# Patient Record
Sex: MALE | Race: WHITE | Employment: OTHER | URBAN - METROPOLITAN AREA
[De-identification: names, ages, dates, MRNs, and addresses within clinical notes are randomized per-mention and may not be internally consistent; named-entity substitution may affect disease eponyms.]

---

## 2017-06-19 PROBLEM — R29.6 FREQUENT FALLS: Status: ACTIVE | Noted: 2017-06-19

## 2017-06-19 PROBLEM — R53.1 LEFT-SIDED WEAKNESS: Status: ACTIVE | Noted: 2017-06-19

## 2017-06-19 PROBLEM — R53.81 PHYSICAL DECONDITIONING: Status: ACTIVE | Noted: 2017-06-19

## 2017-06-20 PROBLEM — D72.829 LEUKOCYTOSIS: Status: ACTIVE | Noted: 2017-06-20

## 2017-06-20 PROBLEM — I71.40 ABDOMINAL AORTIC ANEURYSM (AAA) WITHOUT RUPTURE: Status: ACTIVE | Noted: 2017-06-20

## 2017-06-20 PROBLEM — R13.0 APHAGIA: Status: ACTIVE | Noted: 2017-06-20

## 2017-09-17 PROBLEM — J98.11 ATELECTASIS: Status: ACTIVE | Noted: 2017-09-17

## 2017-09-17 PROBLEM — R53.81 DEBILITY: Status: ACTIVE | Noted: 2017-09-17

## 2017-09-17 PROBLEM — R03.0 ELEVATED BLOOD PRESSURE READING: Status: ACTIVE | Noted: 2017-09-17

## 2017-09-17 PROBLEM — E78.5 DYSLIPIDEMIA: Status: ACTIVE | Noted: 2017-09-17

## 2017-09-17 PROBLEM — E03.4 HYPOTHYROIDISM DUE TO ACQUIRED ATROPHY OF THYROID: Status: ACTIVE | Noted: 2017-09-17

## 2017-10-27 PROCEDURE — 93272 ECG/REVIEW INTERPRET ONLY: CPT | Performed by: INTERNAL MEDICINE

## 2017-11-06 DIAGNOSIS — I63.231 ARTERIAL ISCHEMIC STROKE, ICA, RIGHT, ACUTE (HCC): ICD-10-CM

## 2018-12-19 ENCOUNTER — APPOINTMENT (OUTPATIENT)
Dept: CT IMAGING | Age: 83
End: 2018-12-19
Payer: MEDICARE

## 2018-12-19 ENCOUNTER — HOSPITAL ENCOUNTER (OUTPATIENT)
Age: 83
Setting detail: OBSERVATION
Discharge: NURSING, REHAB OR LONG-TERM CARE | End: 2018-12-22
Attending: EMERGENCY MEDICINE | Admitting: FAMILY MEDICINE
Payer: MEDICARE

## 2018-12-19 ENCOUNTER — APPOINTMENT (OUTPATIENT)
Dept: GENERAL RADIOLOGY | Age: 83
End: 2018-12-19
Payer: MEDICARE

## 2018-12-19 DIAGNOSIS — R11.2 NAUSEA AND VOMITING, INTRACTABILITY OF VOMITING NOT SPECIFIED, UNSPECIFIED VOMITING TYPE: ICD-10-CM

## 2018-12-19 DIAGNOSIS — R41.82 ALTERED MENTAL STATUS, UNSPECIFIED ALTERED MENTAL STATUS TYPE: Primary | ICD-10-CM

## 2018-12-19 PROBLEM — R11.10 VOMITING: Status: ACTIVE | Noted: 2018-12-19

## 2018-12-19 LAB
A/G RATIO: 1.3 (ref 1.1–2.2)
ABO/RH: NORMAL
ALBUMIN SERPL-MCNC: 3.4 G/DL (ref 3.4–5)
ALP BLD-CCNC: 55 U/L (ref 40–129)
ALT SERPL-CCNC: 19 U/L (ref 10–40)
ANION GAP SERPL CALCULATED.3IONS-SCNC: 11 MMOL/L (ref 3–16)
ANTIBODY SCREEN: NORMAL
AST SERPL-CCNC: 17 U/L (ref 15–37)
BASOPHILS ABSOLUTE: 0.1 K/UL (ref 0–0.2)
BASOPHILS RELATIVE PERCENT: 0.6 %
BILIRUB SERPL-MCNC: 0.4 MG/DL (ref 0–1)
BILIRUBIN URINE: NEGATIVE
BLOOD, URINE: NEGATIVE
BUN BLDV-MCNC: 13 MG/DL (ref 7–20)
CALCIUM SERPL-MCNC: 8.5 MG/DL (ref 8.3–10.6)
CHLORIDE BLD-SCNC: 105 MMOL/L (ref 99–110)
CLARITY: CLEAR
CO2: 24 MMOL/L (ref 21–32)
COLOR: YELLOW
CREAT SERPL-MCNC: 0.8 MG/DL (ref 0.8–1.3)
EOSINOPHILS ABSOLUTE: 0.2 K/UL (ref 0–0.6)
EOSINOPHILS RELATIVE PERCENT: 2 %
GFR AFRICAN AMERICAN: >60
GFR NON-AFRICAN AMERICAN: >60
GLOBULIN: 2.6 G/DL
GLUCOSE BLD-MCNC: 111 MG/DL (ref 70–99)
GLUCOSE BLD-MCNC: 116 MG/DL (ref 70–99)
GLUCOSE URINE: NEGATIVE MG/DL
HCT VFR BLD CALC: 40.2 % (ref 40.5–52.5)
HEMOGLOBIN: 13.4 G/DL (ref 13.5–17.5)
INR BLD: 1.13 (ref 0.86–1.14)
KETONES, URINE: NEGATIVE MG/DL
LACTIC ACID: 0.9 MMOL/L (ref 0.4–2)
LEUKOCYTE ESTERASE, URINE: NEGATIVE
LIPASE: 28 U/L (ref 13–60)
LYMPHOCYTES ABSOLUTE: 2.2 K/UL (ref 1–5.1)
LYMPHOCYTES RELATIVE PERCENT: 27.2 %
MAGNESIUM: 2 MG/DL (ref 1.8–2.4)
MCH RBC QN AUTO: 32.1 PG (ref 26–34)
MCHC RBC AUTO-ENTMCNC: 33.5 G/DL (ref 31–36)
MCV RBC AUTO: 95.8 FL (ref 80–100)
MICROSCOPIC EXAMINATION: NORMAL
MONOCYTES ABSOLUTE: 0.6 K/UL (ref 0–1.3)
MONOCYTES RELATIVE PERCENT: 7.1 %
NEUTROPHILS ABSOLUTE: 5.2 K/UL (ref 1.7–7.7)
NEUTROPHILS RELATIVE PERCENT: 63.1 %
NITRITE, URINE: NEGATIVE
PDW BLD-RTO: 13.8 % (ref 12.4–15.4)
PERFORMED ON: ABNORMAL
PH UA: 6
PLATELET # BLD: 185 K/UL (ref 135–450)
PMV BLD AUTO: 8 FL (ref 5–10.5)
POTASSIUM REFLEX MAGNESIUM: 3.2 MMOL/L (ref 3.5–5.1)
PROTEIN UA: NEGATIVE MG/DL
PROTHROMBIN TIME: 12.8 SEC (ref 9.8–13)
RAPID INFLUENZA  B AGN: NEGATIVE
RAPID INFLUENZA A AGN: NEGATIVE
RBC # BLD: 4.19 M/UL (ref 4.2–5.9)
SODIUM BLD-SCNC: 140 MMOL/L (ref 136–145)
SPECIFIC GRAVITY UA: 1.01
TOTAL PROTEIN: 6 G/DL (ref 6.4–8.2)
TROPONIN: <0.01 NG/ML
URINE REFLEX TO CULTURE: NORMAL
URINE TYPE: NORMAL
UROBILINOGEN, URINE: 0.2 E.U./DL
WBC # BLD: 8.2 K/UL (ref 4–11)

## 2018-12-19 PROCEDURE — 70498 CT ANGIOGRAPHY NECK: CPT

## 2018-12-19 PROCEDURE — 85610 PROTHROMBIN TIME: CPT

## 2018-12-19 PROCEDURE — 96361 HYDRATE IV INFUSION ADD-ON: CPT

## 2018-12-19 PROCEDURE — 70496 CT ANGIOGRAPHY HEAD: CPT

## 2018-12-19 PROCEDURE — 2580000003 HC RX 258: Performed by: FAMILY MEDICINE

## 2018-12-19 PROCEDURE — 6370000000 HC RX 637 (ALT 250 FOR IP): Performed by: FAMILY MEDICINE

## 2018-12-19 PROCEDURE — 99291 CRITICAL CARE FIRST HOUR: CPT

## 2018-12-19 PROCEDURE — 96374 THER/PROPH/DIAG INJ IV PUSH: CPT

## 2018-12-19 PROCEDURE — 87804 INFLUENZA ASSAY W/OPTIC: CPT

## 2018-12-19 PROCEDURE — 93005 ELECTROCARDIOGRAM TRACING: CPT | Performed by: EMERGENCY MEDICINE

## 2018-12-19 PROCEDURE — 86900 BLOOD TYPING SEROLOGIC ABO: CPT

## 2018-12-19 PROCEDURE — 80053 COMPREHEN METABOLIC PANEL: CPT

## 2018-12-19 PROCEDURE — 84484 ASSAY OF TROPONIN QUANT: CPT

## 2018-12-19 PROCEDURE — 70450 CT HEAD/BRAIN W/O DYE: CPT

## 2018-12-19 PROCEDURE — 85025 COMPLETE CBC W/AUTO DIFF WBC: CPT

## 2018-12-19 PROCEDURE — 74176 CT ABD & PELVIS W/O CONTRAST: CPT

## 2018-12-19 PROCEDURE — 6360000002 HC RX W HCPCS: Performed by: FAMILY MEDICINE

## 2018-12-19 PROCEDURE — 83690 ASSAY OF LIPASE: CPT

## 2018-12-19 PROCEDURE — 81003 URINALYSIS AUTO W/O SCOPE: CPT

## 2018-12-19 PROCEDURE — G0378 HOSPITAL OBSERVATION PER HR: HCPCS

## 2018-12-19 PROCEDURE — 83605 ASSAY OF LACTIC ACID: CPT

## 2018-12-19 PROCEDURE — 2580000003 HC RX 258: Performed by: EMERGENCY MEDICINE

## 2018-12-19 PROCEDURE — 71046 X-RAY EXAM CHEST 2 VIEWS: CPT

## 2018-12-19 PROCEDURE — 83735 ASSAY OF MAGNESIUM: CPT

## 2018-12-19 PROCEDURE — 86901 BLOOD TYPING SEROLOGIC RH(D): CPT

## 2018-12-19 PROCEDURE — 86850 RBC ANTIBODY SCREEN: CPT

## 2018-12-19 PROCEDURE — 6360000004 HC RX CONTRAST MEDICATION: Performed by: EMERGENCY MEDICINE

## 2018-12-19 PROCEDURE — 6360000002 HC RX W HCPCS: Performed by: EMERGENCY MEDICINE

## 2018-12-19 RX ORDER — ASPIRIN 81 MG/1
81 TABLET ORAL DAILY
Status: DISCONTINUED | OUTPATIENT
Start: 2018-12-19 | End: 2018-12-22 | Stop reason: HOSPADM

## 2018-12-19 RX ORDER — ROSUVASTATIN CALCIUM 10 MG/1
20 TABLET, COATED ORAL DAILY
Status: DISCONTINUED | OUTPATIENT
Start: 2018-12-20 | End: 2018-12-22 | Stop reason: HOSPADM

## 2018-12-19 RX ORDER — ONDANSETRON 2 MG/ML
4 INJECTION INTRAMUSCULAR; INTRAVENOUS
Status: DISCONTINUED | OUTPATIENT
Start: 2018-12-19 | End: 2018-12-22 | Stop reason: HOSPADM

## 2018-12-19 RX ORDER — IBUPROFEN 200 MG
400 TABLET ORAL EVERY 8 HOURS PRN
COMMUNITY

## 2018-12-19 RX ORDER — LEVOTHYROXINE SODIUM 0.1 MG/1
100 TABLET ORAL
Status: DISCONTINUED | OUTPATIENT
Start: 2018-12-20 | End: 2018-12-22 | Stop reason: HOSPADM

## 2018-12-19 RX ORDER — TAMSULOSIN HYDROCHLORIDE 0.4 MG/1
0.8 CAPSULE ORAL NIGHTLY
Status: DISCONTINUED | OUTPATIENT
Start: 2018-12-19 | End: 2018-12-22 | Stop reason: HOSPADM

## 2018-12-19 RX ORDER — SODIUM CHLORIDE 0.9 % (FLUSH) 0.9 %
10 SYRINGE (ML) INJECTION EVERY 12 HOURS SCHEDULED
Status: DISCONTINUED | OUTPATIENT
Start: 2018-12-19 | End: 2018-12-22 | Stop reason: HOSPADM

## 2018-12-19 RX ORDER — TAMSULOSIN HYDROCHLORIDE 0.4 MG/1
0.8 CAPSULE ORAL NIGHTLY
Status: ON HOLD | COMMUNITY
End: 2018-12-22 | Stop reason: HOSPADM

## 2018-12-19 RX ORDER — DOXEPIN HYDROCHLORIDE 25 MG/1
25 CAPSULE ORAL NIGHTLY
Status: DISCONTINUED | OUTPATIENT
Start: 2018-12-19 | End: 2018-12-22 | Stop reason: HOSPADM

## 2018-12-19 RX ORDER — CARBOXYMETHYLCELLULOSE SODIUM 10 MG/ML
1 GEL OPHTHALMIC 2 TIMES DAILY
Status: DISCONTINUED | OUTPATIENT
Start: 2018-12-19 | End: 2018-12-22 | Stop reason: HOSPADM

## 2018-12-19 RX ORDER — POTASSIUM CHLORIDE 7.45 MG/ML
10 INJECTION INTRAVENOUS
Status: COMPLETED | OUTPATIENT
Start: 2018-12-19 | End: 2018-12-19

## 2018-12-19 RX ORDER — SENNA AND DOCUSATE SODIUM 50; 8.6 MG/1; MG/1
1 TABLET, FILM COATED ORAL 2 TIMES DAILY
Status: ON HOLD | COMMUNITY
End: 2019-05-17

## 2018-12-19 RX ORDER — ROSUVASTATIN CALCIUM 20 MG/1
20 TABLET, COATED ORAL DAILY
COMMUNITY

## 2018-12-19 RX ORDER — FERROUS SULFATE 325(65) MG
325 TABLET ORAL 2 TIMES DAILY WITH MEALS
Status: DISCONTINUED | OUTPATIENT
Start: 2018-12-19 | End: 2018-12-22 | Stop reason: HOSPADM

## 2018-12-19 RX ORDER — DESOXIMETASONE 2.5 MG/G
CREAM TOPICAL 2 TIMES DAILY PRN
Status: ON HOLD | COMMUNITY
End: 2019-05-17

## 2018-12-19 RX ORDER — SODIUM CHLORIDE 9 MG/ML
INJECTION, SOLUTION INTRAVENOUS
Status: DISPENSED
Start: 2018-12-19 | End: 2018-12-20

## 2018-12-19 RX ORDER — 0.9 % SODIUM CHLORIDE 0.9 %
1000 INTRAVENOUS SOLUTION INTRAVENOUS ONCE
Status: COMPLETED | OUTPATIENT
Start: 2018-12-19 | End: 2018-12-19

## 2018-12-19 RX ORDER — ONDANSETRON 2 MG/ML
4 INJECTION INTRAMUSCULAR; INTRAVENOUS EVERY 6 HOURS PRN
Status: DISCONTINUED | OUTPATIENT
Start: 2018-12-19 | End: 2018-12-22 | Stop reason: HOSPADM

## 2018-12-19 RX ORDER — VITAMIN B COMPLEX
1 CAPSULE ORAL DAILY
COMMUNITY

## 2018-12-19 RX ORDER — SODIUM CHLORIDE 0.9 % (FLUSH) 0.9 %
10 SYRINGE (ML) INJECTION PRN
Status: DISCONTINUED | OUTPATIENT
Start: 2018-12-19 | End: 2018-12-22 | Stop reason: HOSPADM

## 2018-12-19 RX ADMIN — CARBOXYMETHYLCELLULOSE SODIUM 1 DROP: 10 GEL OPHTHALMIC at 21:33

## 2018-12-19 RX ADMIN — TAMSULOSIN HYDROCHLORIDE 0.8 MG: 0.4 CAPSULE ORAL at 21:36

## 2018-12-19 RX ADMIN — DOXEPIN HYDROCHLORIDE 25 MG: 25 CAPSULE ORAL at 21:32

## 2018-12-19 RX ADMIN — IOPAMIDOL 75 ML: 755 INJECTION, SOLUTION INTRAVENOUS at 11:32

## 2018-12-19 RX ADMIN — POTASSIUM CHLORIDE 10 MEQ: 7.46 INJECTION, SOLUTION INTRAVENOUS at 18:29

## 2018-12-19 RX ADMIN — POTASSIUM CHLORIDE 10 MEQ: 7.46 INJECTION, SOLUTION INTRAVENOUS at 19:39

## 2018-12-19 RX ADMIN — ONDANSETRON 4 MG: 2 INJECTION INTRAMUSCULAR; INTRAVENOUS at 14:52

## 2018-12-19 RX ADMIN — Medication 10 ML: at 21:36

## 2018-12-19 RX ADMIN — POTASSIUM CHLORIDE 10 MEQ: 7.46 INJECTION, SOLUTION INTRAVENOUS at 21:24

## 2018-12-19 RX ADMIN — SODIUM CHLORIDE 1000 ML: 9 INJECTION, SOLUTION INTRAVENOUS at 15:40

## 2018-12-19 RX ADMIN — ASPIRIN 81 MG: 81 TABLET, COATED ORAL at 19:34

## 2018-12-19 RX ADMIN — FERROUS SULFATE TAB 325 MG (65 MG ELEMENTAL FE) 325 MG: 325 (65 FE) TAB at 19:34

## 2018-12-19 ASSESSMENT — PAIN SCALES - GENERAL
PAINLEVEL_OUTOF10: 3
PAINLEVEL_OUTOF10: 3
PAINLEVEL_OUTOF10: 5

## 2018-12-20 LAB
ANION GAP SERPL CALCULATED.3IONS-SCNC: 11 MMOL/L (ref 3–16)
BASOPHILS ABSOLUTE: 0.1 K/UL (ref 0–0.2)
BASOPHILS RELATIVE PERCENT: 0.8 %
BUN BLDV-MCNC: 10 MG/DL (ref 7–20)
CALCIUM SERPL-MCNC: 8.7 MG/DL (ref 8.3–10.6)
CHLORIDE BLD-SCNC: 106 MMOL/L (ref 99–110)
CO2: 26 MMOL/L (ref 21–32)
CREAT SERPL-MCNC: 0.7 MG/DL (ref 0.8–1.3)
EKG ATRIAL RATE: 75 BPM
EKG DIAGNOSIS: NORMAL
EKG P AXIS: 37 DEGREES
EKG P-R INTERVAL: 302 MS
EKG Q-T INTERVAL: 434 MS
EKG QRS DURATION: 180 MS
EKG QTC CALCULATION (BAZETT): 484 MS
EKG R AXIS: 107 DEGREES
EKG T AXIS: -17 DEGREES
EKG VENTRICULAR RATE: 75 BPM
EOSINOPHILS ABSOLUTE: 0.2 K/UL (ref 0–0.6)
EOSINOPHILS RELATIVE PERCENT: 3.3 %
GFR AFRICAN AMERICAN: >60
GFR NON-AFRICAN AMERICAN: >60
GLUCOSE BLD-MCNC: 130 MG/DL (ref 70–99)
HCT VFR BLD CALC: 39.4 % (ref 40.5–52.5)
HEMOGLOBIN: 13.1 G/DL (ref 13.5–17.5)
LACTIC ACID: 1.1 MMOL/L (ref 0.4–2)
LIPASE: 31 U/L (ref 13–60)
LYMPHOCYTES ABSOLUTE: 1.9 K/UL (ref 1–5.1)
LYMPHOCYTES RELATIVE PERCENT: 27.3 %
MAGNESIUM: 2 MG/DL (ref 1.8–2.4)
MCH RBC QN AUTO: 32 PG (ref 26–34)
MCHC RBC AUTO-ENTMCNC: 33.1 G/DL (ref 31–36)
MCV RBC AUTO: 96.7 FL (ref 80–100)
MONOCYTES ABSOLUTE: 0.6 K/UL (ref 0–1.3)
MONOCYTES RELATIVE PERCENT: 8.6 %
NEUTROPHILS ABSOLUTE: 4.2 K/UL (ref 1.7–7.7)
NEUTROPHILS RELATIVE PERCENT: 60 %
PDW BLD-RTO: 14.1 % (ref 12.4–15.4)
PLATELET # BLD: 197 K/UL (ref 135–450)
PMV BLD AUTO: 8 FL (ref 5–10.5)
POTASSIUM REFLEX MAGNESIUM: 3.4 MMOL/L (ref 3.5–5.1)
RBC # BLD: 4.08 M/UL (ref 4.2–5.9)
SODIUM BLD-SCNC: 143 MMOL/L (ref 136–145)
TROPONIN: 0.01 NG/ML
WBC # BLD: 7 K/UL (ref 4–11)

## 2018-12-20 PROCEDURE — 83735 ASSAY OF MAGNESIUM: CPT

## 2018-12-20 PROCEDURE — 83690 ASSAY OF LIPASE: CPT

## 2018-12-20 PROCEDURE — 6370000000 HC RX 637 (ALT 250 FOR IP): Performed by: FAMILY MEDICINE

## 2018-12-20 PROCEDURE — 93010 ELECTROCARDIOGRAM REPORT: CPT | Performed by: INTERNAL MEDICINE

## 2018-12-20 PROCEDURE — 36415 COLL VENOUS BLD VENIPUNCTURE: CPT

## 2018-12-20 PROCEDURE — 94761 N-INVAS EAR/PLS OXIMETRY MLT: CPT

## 2018-12-20 PROCEDURE — 2700000000 HC OXYGEN THERAPY PER DAY

## 2018-12-20 PROCEDURE — 96375 TX/PRO/DX INJ NEW DRUG ADDON: CPT

## 2018-12-20 PROCEDURE — 85025 COMPLETE CBC W/AUTO DIFF WBC: CPT

## 2018-12-20 PROCEDURE — 84484 ASSAY OF TROPONIN QUANT: CPT

## 2018-12-20 PROCEDURE — 80048 BASIC METABOLIC PNL TOTAL CA: CPT

## 2018-12-20 PROCEDURE — 6360000002 HC RX W HCPCS: Performed by: FAMILY MEDICINE

## 2018-12-20 PROCEDURE — 6360000002 HC RX W HCPCS: Performed by: NURSE PRACTITIONER

## 2018-12-20 PROCEDURE — 83605 ASSAY OF LACTIC ACID: CPT

## 2018-12-20 PROCEDURE — 96376 TX/PRO/DX INJ SAME DRUG ADON: CPT

## 2018-12-20 PROCEDURE — G0378 HOSPITAL OBSERVATION PER HR: HCPCS

## 2018-12-20 PROCEDURE — 2580000003 HC RX 258: Performed by: FAMILY MEDICINE

## 2018-12-20 PROCEDURE — 6370000000 HC RX 637 (ALT 250 FOR IP): Performed by: INTERNAL MEDICINE

## 2018-12-20 RX ORDER — POTASSIUM CHLORIDE 20 MEQ/1
40 TABLET, EXTENDED RELEASE ORAL ONCE
Status: COMPLETED | OUTPATIENT
Start: 2018-12-20 | End: 2018-12-20

## 2018-12-20 RX ORDER — MORPHINE SULFATE 2 MG/ML
2 INJECTION, SOLUTION INTRAMUSCULAR; INTRAVENOUS ONCE
Status: COMPLETED | OUTPATIENT
Start: 2018-12-20 | End: 2018-12-20

## 2018-12-20 RX ORDER — MORPHINE SULFATE 2 MG/ML
1 INJECTION, SOLUTION INTRAMUSCULAR; INTRAVENOUS EVERY 4 HOURS PRN
Status: DISCONTINUED | OUTPATIENT
Start: 2018-12-20 | End: 2018-12-22 | Stop reason: HOSPADM

## 2018-12-20 RX ORDER — DICYCLOMINE HYDROCHLORIDE 10 MG/1
10 CAPSULE ORAL ONCE
Status: COMPLETED | OUTPATIENT
Start: 2018-12-20 | End: 2018-12-20

## 2018-12-20 RX ORDER — POLYETHYLENE GLYCOL 3350 17 G/17G
17 POWDER, FOR SOLUTION ORAL 2 TIMES DAILY
Status: DISCONTINUED | OUTPATIENT
Start: 2018-12-20 | End: 2018-12-22

## 2018-12-20 RX ADMIN — DOXEPIN HYDROCHLORIDE 25 MG: 25 CAPSULE ORAL at 20:29

## 2018-12-20 RX ADMIN — Medication 10 ML: at 20:29

## 2018-12-20 RX ADMIN — DICYCLOMINE HYDROCHLORIDE 10 MG: 10 CAPSULE ORAL at 06:54

## 2018-12-20 RX ADMIN — MORPHINE SULFATE 2 MG: 2 INJECTION, SOLUTION INTRAMUSCULAR; INTRAVENOUS at 16:59

## 2018-12-20 RX ADMIN — LEVOTHYROXINE SODIUM 100 MCG: 100 TABLET ORAL at 06:45

## 2018-12-20 RX ADMIN — CARBOXYMETHYLCELLULOSE SODIUM 1 DROP: 10 GEL OPHTHALMIC at 20:29

## 2018-12-20 RX ADMIN — ONDANSETRON HYDROCHLORIDE 4 MG: 2 INJECTION, SOLUTION INTRAVENOUS at 06:45

## 2018-12-20 RX ADMIN — POTASSIUM CHLORIDE 40 MEQ: 20 TABLET, EXTENDED RELEASE ORAL at 18:40

## 2018-12-20 RX ADMIN — TAMSULOSIN HYDROCHLORIDE 0.8 MG: 0.4 CAPSULE ORAL at 20:28

## 2018-12-20 RX ADMIN — MORPHINE SULFATE 1 MG: 2 INJECTION, SOLUTION INTRAMUSCULAR; INTRAVENOUS at 23:12

## 2018-12-20 ASSESSMENT — PAIN DESCRIPTION - ORIENTATION
ORIENTATION: RIGHT;LOWER
ORIENTATION: RIGHT;LOWER

## 2018-12-20 ASSESSMENT — PAIN SCALES - GENERAL
PAINLEVEL_OUTOF10: 0
PAINLEVEL_OUTOF10: 10
PAINLEVEL_OUTOF10: 8
PAINLEVEL_OUTOF10: 0

## 2018-12-20 ASSESSMENT — PAIN DESCRIPTION - LOCATION
LOCATION: ABDOMEN
LOCATION: ABDOMEN

## 2018-12-20 ASSESSMENT — PAIN DESCRIPTION - PAIN TYPE
TYPE: ACUTE PAIN;CHRONIC PAIN
TYPE: ACUTE PAIN

## 2018-12-21 ENCOUNTER — APPOINTMENT (OUTPATIENT)
Dept: GENERAL RADIOLOGY | Age: 83
End: 2018-12-21
Payer: MEDICARE

## 2018-12-21 LAB
A/G RATIO: 1.3 (ref 1.1–2.2)
ALBUMIN SERPL-MCNC: 3.6 G/DL (ref 3.4–5)
ALP BLD-CCNC: 59 U/L (ref 40–129)
ALT SERPL-CCNC: 19 U/L (ref 10–40)
ANION GAP SERPL CALCULATED.3IONS-SCNC: 10 MMOL/L (ref 3–16)
AST SERPL-CCNC: 16 U/L (ref 15–37)
BASOPHILS ABSOLUTE: 0.1 K/UL (ref 0–0.2)
BASOPHILS RELATIVE PERCENT: 0.7 %
BILIRUB SERPL-MCNC: 0.4 MG/DL (ref 0–1)
BUN BLDV-MCNC: 13 MG/DL (ref 7–20)
CALCIUM SERPL-MCNC: 9.2 MG/DL (ref 8.3–10.6)
CHLORIDE BLD-SCNC: 104 MMOL/L (ref 99–110)
CO2: 27 MMOL/L (ref 21–32)
CREAT SERPL-MCNC: 0.9 MG/DL (ref 0.8–1.3)
EOSINOPHILS ABSOLUTE: 0.2 K/UL (ref 0–0.6)
EOSINOPHILS RELATIVE PERCENT: 2.7 %
GFR AFRICAN AMERICAN: >60
GFR NON-AFRICAN AMERICAN: >60
GLOBULIN: 2.8 G/DL
GLUCOSE BLD-MCNC: 136 MG/DL (ref 70–99)
HCT VFR BLD CALC: 42.6 % (ref 40.5–52.5)
HEMOGLOBIN: 14 G/DL (ref 13.5–17.5)
LYMPHOCYTES ABSOLUTE: 2.1 K/UL (ref 1–5.1)
LYMPHOCYTES RELATIVE PERCENT: 24.7 %
MCH RBC QN AUTO: 31.9 PG (ref 26–34)
MCHC RBC AUTO-ENTMCNC: 32.9 G/DL (ref 31–36)
MCV RBC AUTO: 96.9 FL (ref 80–100)
MONOCYTES ABSOLUTE: 0.6 K/UL (ref 0–1.3)
MONOCYTES RELATIVE PERCENT: 7.3 %
NEUTROPHILS ABSOLUTE: 5.6 K/UL (ref 1.7–7.7)
NEUTROPHILS RELATIVE PERCENT: 64.6 %
PDW BLD-RTO: 14.2 % (ref 12.4–15.4)
PLATELET # BLD: 209 K/UL (ref 135–450)
PMV BLD AUTO: 7.5 FL (ref 5–10.5)
POTASSIUM REFLEX MAGNESIUM: 4.1 MMOL/L (ref 3.5–5.1)
RBC # BLD: 4.4 M/UL (ref 4.2–5.9)
SODIUM BLD-SCNC: 141 MMOL/L (ref 136–145)
TOTAL PROTEIN: 6.4 G/DL (ref 6.4–8.2)
WBC # BLD: 8.6 K/UL (ref 4–11)

## 2018-12-21 PROCEDURE — 6360000002 HC RX W HCPCS: Performed by: NURSE PRACTITIONER

## 2018-12-21 PROCEDURE — G8988 SELF CARE GOAL STATUS: HCPCS

## 2018-12-21 PROCEDURE — 97530 THERAPEUTIC ACTIVITIES: CPT

## 2018-12-21 PROCEDURE — 6370000000 HC RX 637 (ALT 250 FOR IP): Performed by: FAMILY MEDICINE

## 2018-12-21 PROCEDURE — 6360000002 HC RX W HCPCS: Performed by: FAMILY MEDICINE

## 2018-12-21 PROCEDURE — G0378 HOSPITAL OBSERVATION PER HR: HCPCS

## 2018-12-21 PROCEDURE — G8978 MOBILITY CURRENT STATUS: HCPCS

## 2018-12-21 PROCEDURE — 97166 OT EVAL MOD COMPLEX 45 MIN: CPT

## 2018-12-21 PROCEDURE — 80053 COMPREHEN METABOLIC PANEL: CPT

## 2018-12-21 PROCEDURE — 6370000000 HC RX 637 (ALT 250 FOR IP): Performed by: INTERNAL MEDICINE

## 2018-12-21 PROCEDURE — G8979 MOBILITY GOAL STATUS: HCPCS

## 2018-12-21 PROCEDURE — G8987 SELF CARE CURRENT STATUS: HCPCS

## 2018-12-21 PROCEDURE — 96372 THER/PROPH/DIAG INJ SC/IM: CPT

## 2018-12-21 PROCEDURE — 74018 RADEX ABDOMEN 1 VIEW: CPT

## 2018-12-21 PROCEDURE — 2580000003 HC RX 258: Performed by: FAMILY MEDICINE

## 2018-12-21 PROCEDURE — 36415 COLL VENOUS BLD VENIPUNCTURE: CPT

## 2018-12-21 PROCEDURE — 85025 COMPLETE CBC W/AUTO DIFF WBC: CPT

## 2018-12-21 PROCEDURE — 96376 TX/PRO/DX INJ SAME DRUG ADON: CPT

## 2018-12-21 PROCEDURE — 97162 PT EVAL MOD COMPLEX 30 MIN: CPT

## 2018-12-21 RX ADMIN — MORPHINE SULFATE 1 MG: 2 INJECTION, SOLUTION INTRAMUSCULAR; INTRAVENOUS at 16:15

## 2018-12-21 RX ADMIN — ENOXAPARIN SODIUM 40 MG: 40 INJECTION SUBCUTANEOUS at 09:43

## 2018-12-21 RX ADMIN — FERROUS SULFATE TAB 325 MG (65 MG ELEMENTAL FE) 325 MG: 325 (65 FE) TAB at 19:47

## 2018-12-21 RX ADMIN — CARBOXYMETHYLCELLULOSE SODIUM 1 DROP: 10 GEL OPHTHALMIC at 21:57

## 2018-12-21 RX ADMIN — TAMSULOSIN HYDROCHLORIDE 0.8 MG: 0.4 CAPSULE ORAL at 21:57

## 2018-12-21 RX ADMIN — FERROUS SULFATE TAB 325 MG (65 MG ELEMENTAL FE) 325 MG: 325 (65 FE) TAB at 09:43

## 2018-12-21 RX ADMIN — POLYETHYLENE GLYCOL (3350) 17 G: 17 POWDER, FOR SOLUTION ORAL at 21:57

## 2018-12-21 RX ADMIN — ROSUVASTATIN CALCIUM 20 MG: 10 TABLET, FILM COATED ORAL at 09:43

## 2018-12-21 RX ADMIN — LEVOTHYROXINE SODIUM 100 MCG: 100 TABLET ORAL at 05:45

## 2018-12-21 RX ADMIN — Medication 10 ML: at 21:57

## 2018-12-21 RX ADMIN — MORPHINE SULFATE 1 MG: 2 INJECTION, SOLUTION INTRAMUSCULAR; INTRAVENOUS at 05:44

## 2018-12-21 RX ADMIN — ASPIRIN 81 MG: 81 TABLET, COATED ORAL at 09:43

## 2018-12-21 RX ADMIN — POLYETHYLENE GLYCOL (3350) 17 G: 17 POWDER, FOR SOLUTION ORAL at 09:43

## 2018-12-21 RX ADMIN — Medication 10 ML: at 09:49

## 2018-12-21 RX ADMIN — CARBOXYMETHYLCELLULOSE SODIUM 1 DROP: 10 GEL OPHTHALMIC at 09:48

## 2018-12-21 RX ADMIN — DOXEPIN HYDROCHLORIDE 25 MG: 25 CAPSULE ORAL at 21:57

## 2018-12-21 ASSESSMENT — PAIN DESCRIPTION - ORIENTATION: ORIENTATION: RIGHT;LOWER

## 2018-12-21 ASSESSMENT — PAIN DESCRIPTION - PAIN TYPE: TYPE: ACUTE PAIN

## 2018-12-21 ASSESSMENT — PAIN DESCRIPTION - LOCATION: LOCATION: ABDOMEN

## 2018-12-21 ASSESSMENT — PAIN SCALES - GENERAL
PAINLEVEL_OUTOF10: 6
PAINLEVEL_OUTOF10: 10

## 2018-12-22 VITALS
DIASTOLIC BLOOD PRESSURE: 76 MMHG | HEART RATE: 76 BPM | BODY MASS INDEX: 22.86 KG/M2 | OXYGEN SATURATION: 91 % | SYSTOLIC BLOOD PRESSURE: 156 MMHG | RESPIRATION RATE: 16 BRPM | WEIGHT: 168.8 LBS | HEIGHT: 72 IN | TEMPERATURE: 98.8 F

## 2018-12-22 PROBLEM — F03.918: Chronic | Status: ACTIVE | Noted: 2018-12-22

## 2018-12-22 LAB
A/G RATIO: 1.1 (ref 1.1–2.2)
ALBUMIN SERPL-MCNC: 3.2 G/DL (ref 3.4–5)
ALP BLD-CCNC: 61 U/L (ref 40–129)
ALT SERPL-CCNC: 17 U/L (ref 10–40)
ANION GAP SERPL CALCULATED.3IONS-SCNC: 13 MMOL/L (ref 3–16)
AST SERPL-CCNC: 15 U/L (ref 15–37)
BASOPHILS ABSOLUTE: 0 K/UL (ref 0–0.2)
BASOPHILS RELATIVE PERCENT: 0.4 %
BILIRUB SERPL-MCNC: 0.5 MG/DL (ref 0–1)
BUN BLDV-MCNC: 16 MG/DL (ref 7–20)
CALCIUM SERPL-MCNC: 8.8 MG/DL (ref 8.3–10.6)
CHLORIDE BLD-SCNC: 102 MMOL/L (ref 99–110)
CO2: 27 MMOL/L (ref 21–32)
CREAT SERPL-MCNC: 0.9 MG/DL (ref 0.8–1.3)
EOSINOPHILS ABSOLUTE: 0.3 K/UL (ref 0–0.6)
EOSINOPHILS RELATIVE PERCENT: 3.4 %
GFR AFRICAN AMERICAN: >60
GFR NON-AFRICAN AMERICAN: >60
GLOBULIN: 2.8 G/DL
GLUCOSE BLD-MCNC: 101 MG/DL (ref 70–99)
HCT VFR BLD CALC: 41.3 % (ref 40.5–52.5)
HEMOGLOBIN: 13.7 G/DL (ref 13.5–17.5)
LYMPHOCYTES ABSOLUTE: 1.6 K/UL (ref 1–5.1)
LYMPHOCYTES RELATIVE PERCENT: 17.7 %
MCH RBC QN AUTO: 32 PG (ref 26–34)
MCHC RBC AUTO-ENTMCNC: 33.2 G/DL (ref 31–36)
MCV RBC AUTO: 96.4 FL (ref 80–100)
MONOCYTES ABSOLUTE: 0.9 K/UL (ref 0–1.3)
MONOCYTES RELATIVE PERCENT: 9.6 %
NEUTROPHILS ABSOLUTE: 6.2 K/UL (ref 1.7–7.7)
NEUTROPHILS RELATIVE PERCENT: 68.9 %
PDW BLD-RTO: 14 % (ref 12.4–15.4)
PLATELET # BLD: 207 K/UL (ref 135–450)
PMV BLD AUTO: 8.2 FL (ref 5–10.5)
POTASSIUM REFLEX MAGNESIUM: 3.9 MMOL/L (ref 3.5–5.1)
RBC # BLD: 4.29 M/UL (ref 4.2–5.9)
SODIUM BLD-SCNC: 142 MMOL/L (ref 136–145)
TOTAL PROTEIN: 6 G/DL (ref 6.4–8.2)
WBC # BLD: 9 K/UL (ref 4–11)

## 2018-12-22 PROCEDURE — 6370000000 HC RX 637 (ALT 250 FOR IP): Performed by: INTERNAL MEDICINE

## 2018-12-22 PROCEDURE — 6370000000 HC RX 637 (ALT 250 FOR IP): Performed by: FAMILY MEDICINE

## 2018-12-22 PROCEDURE — 85025 COMPLETE CBC W/AUTO DIFF WBC: CPT

## 2018-12-22 PROCEDURE — 96372 THER/PROPH/DIAG INJ SC/IM: CPT

## 2018-12-22 PROCEDURE — G0378 HOSPITAL OBSERVATION PER HR: HCPCS

## 2018-12-22 PROCEDURE — 36415 COLL VENOUS BLD VENIPUNCTURE: CPT

## 2018-12-22 PROCEDURE — 6360000002 HC RX W HCPCS: Performed by: FAMILY MEDICINE

## 2018-12-22 PROCEDURE — 2580000003 HC RX 258: Performed by: FAMILY MEDICINE

## 2018-12-22 PROCEDURE — 6370000000 HC RX 637 (ALT 250 FOR IP): Performed by: NURSE PRACTITIONER

## 2018-12-22 PROCEDURE — 6360000002 HC RX W HCPCS: Performed by: NURSE PRACTITIONER

## 2018-12-22 PROCEDURE — 80053 COMPREHEN METABOLIC PANEL: CPT

## 2018-12-22 PROCEDURE — 96376 TX/PRO/DX INJ SAME DRUG ADON: CPT

## 2018-12-22 RX ORDER — TAMSULOSIN HYDROCHLORIDE 0.4 MG/1
0.8 CAPSULE ORAL NIGHTLY
Qty: 30 CAPSULE | Refills: 3 | Status: SHIPPED | OUTPATIENT
Start: 2018-12-22

## 2018-12-22 RX ORDER — POLYETHYLENE GLYCOL 3350 17 G/17G
17 POWDER, FOR SOLUTION ORAL 3 TIMES DAILY
Status: DISCONTINUED | OUTPATIENT
Start: 2018-12-22 | End: 2018-12-22 | Stop reason: HOSPADM

## 2018-12-22 RX ORDER — POLYETHYLENE GLYCOL 3350 17 G/17G
17 POWDER, FOR SOLUTION ORAL 2 TIMES DAILY
Qty: 527 G | Refills: 1 | Status: SHIPPED | OUTPATIENT
Start: 2018-12-22 | End: 2019-01-21

## 2018-12-22 RX ORDER — SENNA PLUS 8.6 MG/1
1 TABLET ORAL 2 TIMES DAILY
Status: DISCONTINUED | OUTPATIENT
Start: 2018-12-22 | End: 2018-12-22 | Stop reason: HOSPADM

## 2018-12-22 RX ADMIN — ASPIRIN 81 MG: 81 TABLET, COATED ORAL at 08:16

## 2018-12-22 RX ADMIN — SENNOSIDES 8.6 MG: 8.6 TABLET, FILM COATED ORAL at 16:08

## 2018-12-22 RX ADMIN — MORPHINE SULFATE 1 MG: 2 INJECTION, SOLUTION INTRAMUSCULAR; INTRAVENOUS at 04:04

## 2018-12-22 RX ADMIN — POLYETHYLENE GLYCOL (3350) 17 G: 17 POWDER, FOR SOLUTION ORAL at 08:15

## 2018-12-22 RX ADMIN — ENOXAPARIN SODIUM 40 MG: 40 INJECTION SUBCUTANEOUS at 08:16

## 2018-12-22 RX ADMIN — LEVOTHYROXINE SODIUM 100 MCG: 100 TABLET ORAL at 05:46

## 2018-12-22 RX ADMIN — ROSUVASTATIN CALCIUM 20 MG: 10 TABLET, FILM COATED ORAL at 08:16

## 2018-12-22 RX ADMIN — POLYETHYLENE GLYCOL (3350) 17 G: 17 POWDER, FOR SOLUTION ORAL at 16:08

## 2018-12-22 RX ADMIN — Medication 10 ML: at 08:16

## 2018-12-22 RX ADMIN — FERROUS SULFATE TAB 325 MG (65 MG ELEMENTAL FE) 325 MG: 325 (65 FE) TAB at 08:16

## 2018-12-22 RX ADMIN — CARBOXYMETHYLCELLULOSE SODIUM 1 DROP: 10 GEL OPHTHALMIC at 08:16

## 2018-12-22 ASSESSMENT — PAIN DESCRIPTION - PAIN TYPE
TYPE: ACUTE PAIN
TYPE: ACUTE PAIN

## 2018-12-22 ASSESSMENT — PAIN DESCRIPTION - LOCATION
LOCATION: ABDOMEN
LOCATION: ABDOMEN

## 2018-12-22 ASSESSMENT — PAIN SCALES - GENERAL
PAINLEVEL_OUTOF10: 3
PAINLEVEL_OUTOF10: 0
PAINLEVEL_OUTOF10: 10

## 2019-04-19 ENCOUNTER — HOSPITAL ENCOUNTER (OUTPATIENT)
Dept: CT IMAGING | Age: 84
Discharge: HOME | End: 2019-04-19
Payer: MEDICARE

## 2019-04-19 DIAGNOSIS — M79.661 PAIN AND SWELLING OF RIGHT LOWER LEG: ICD-10-CM

## 2019-04-19 DIAGNOSIS — M79.89 PAIN AND SWELLING OF RIGHT LOWER LEG: ICD-10-CM

## 2019-04-19 PROCEDURE — 73700 CT LOWER EXTREMITY W/O DYE: CPT

## 2019-05-17 ENCOUNTER — APPOINTMENT (OUTPATIENT)
Dept: CT IMAGING | Age: 84
End: 2019-05-17
Payer: MEDICARE

## 2019-05-17 ENCOUNTER — HOSPITAL ENCOUNTER (EMERGENCY)
Age: 84
Discharge: ANOTHER HOSPITAL | End: 2019-05-17
Attending: EMERGENCY MEDICINE
Payer: MEDICARE

## 2019-05-17 ENCOUNTER — APPOINTMENT (OUTPATIENT)
Dept: GENERAL RADIOLOGY | Age: 84
DRG: 086 | End: 2019-05-17
Attending: INTERNAL MEDICINE
Payer: MEDICARE

## 2019-05-17 ENCOUNTER — APPOINTMENT (OUTPATIENT)
Dept: GENERAL RADIOLOGY | Age: 84
End: 2019-05-17
Payer: MEDICARE

## 2019-05-17 ENCOUNTER — APPOINTMENT (OUTPATIENT)
Dept: CT IMAGING | Age: 84
DRG: 086 | End: 2019-05-17
Attending: INTERNAL MEDICINE
Payer: MEDICARE

## 2019-05-17 ENCOUNTER — HOSPITAL ENCOUNTER (INPATIENT)
Age: 84
LOS: 3 days | Discharge: NURSING, REHAB OR LONG-TERM CARE | DRG: 086 | End: 2019-05-20
Attending: INTERNAL MEDICINE | Admitting: INTERNAL MEDICINE
Payer: MEDICARE

## 2019-05-17 VITALS
BODY MASS INDEX: 22.78 KG/M2 | TEMPERATURE: 97.8 F | WEIGHT: 168 LBS | OXYGEN SATURATION: 97 % | DIASTOLIC BLOOD PRESSURE: 63 MMHG | RESPIRATION RATE: 18 BRPM | SYSTOLIC BLOOD PRESSURE: 121 MMHG | HEART RATE: 62 BPM

## 2019-05-17 DIAGNOSIS — S06.5XAA SUBDURAL HEMATOMA: ICD-10-CM

## 2019-05-17 DIAGNOSIS — R55 SYNCOPE AND COLLAPSE: Primary | ICD-10-CM

## 2019-05-17 DIAGNOSIS — I62.00 SUBDURAL HEMORRHAGE (HCC): ICD-10-CM

## 2019-05-17 PROBLEM — I62.9 INTRACRANIAL HEMORRHAGE (HCC): Status: ACTIVE | Noted: 2019-05-17

## 2019-05-17 LAB
A/G RATIO: 1.4 (ref 1.1–2.2)
ALBUMIN SERPL-MCNC: 3.9 G/DL (ref 3.4–5)
ALP BLD-CCNC: 57 U/L (ref 40–129)
ALT SERPL-CCNC: 13 U/L (ref 10–40)
AMMONIA: 61 UMOL/L (ref 16–60)
ANION GAP SERPL CALCULATED.3IONS-SCNC: 7 MMOL/L (ref 3–16)
AST SERPL-CCNC: 16 U/L (ref 15–37)
BASOPHILS ABSOLUTE: 0 K/UL (ref 0–0.2)
BASOPHILS RELATIVE PERCENT: 0.7 %
BILIRUB SERPL-MCNC: 0.4 MG/DL (ref 0–1)
BUN BLDV-MCNC: 19 MG/DL (ref 7–20)
CALCIUM SERPL-MCNC: 9.7 MG/DL (ref 8.3–10.6)
CHLORIDE BLD-SCNC: 102 MMOL/L (ref 99–110)
CO2: 28 MMOL/L (ref 21–32)
CREAT SERPL-MCNC: 1.2 MG/DL (ref 0.8–1.3)
EKG ATRIAL RATE: 57 BPM
EKG DIAGNOSIS: NORMAL
EKG P AXIS: 59 DEGREES
EKG P-R INTERVAL: 242 MS
EKG Q-T INTERVAL: 478 MS
EKG QRS DURATION: 172 MS
EKG QTC CALCULATION (BAZETT): 465 MS
EKG R AXIS: 107 DEGREES
EKG T AXIS: 90 DEGREES
EKG VENTRICULAR RATE: 57 BPM
EOSINOPHILS ABSOLUTE: 0.1 K/UL (ref 0–0.6)
EOSINOPHILS RELATIVE PERCENT: 2.4 %
GFR AFRICAN AMERICAN: >60
GFR NON-AFRICAN AMERICAN: 57
GLOBULIN: 2.7 G/DL
GLUCOSE BLD-MCNC: 116 MG/DL (ref 70–99)
GLUCOSE BLD-MCNC: 98 MG/DL (ref 70–99)
HCT VFR BLD CALC: 39.3 % (ref 40.5–52.5)
HEMOGLOBIN: 13.4 G/DL (ref 13.5–17.5)
INR BLD: 1.15 (ref 0.86–1.14)
LYMPHOCYTES ABSOLUTE: 1 K/UL (ref 1–5.1)
LYMPHOCYTES RELATIVE PERCENT: 20.5 %
MAGNESIUM: 2.2 MG/DL (ref 1.8–2.4)
MCH RBC QN AUTO: 33 PG (ref 26–34)
MCHC RBC AUTO-ENTMCNC: 34.2 G/DL (ref 31–36)
MCV RBC AUTO: 96.5 FL (ref 80–100)
MONOCYTES ABSOLUTE: 0.5 K/UL (ref 0–1.3)
MONOCYTES RELATIVE PERCENT: 9 %
NEUTROPHILS ABSOLUTE: 3.4 K/UL (ref 1.7–7.7)
NEUTROPHILS RELATIVE PERCENT: 67.4 %
PDW BLD-RTO: 13.7 % (ref 12.4–15.4)
PERFORMED ON: NORMAL
PLATELET # BLD: 215 K/UL (ref 135–450)
PMV BLD AUTO: 7.1 FL (ref 5–10.5)
POTASSIUM SERPL-SCNC: 4.9 MMOL/L (ref 3.5–5.1)
PRO-BNP: 433 PG/ML (ref 0–449)
PROTHROMBIN TIME: 13.1 SEC (ref 9.8–13)
RBC # BLD: 4.07 M/UL (ref 4.2–5.9)
SODIUM BLD-SCNC: 137 MMOL/L (ref 136–145)
TOTAL CK: 118 U/L (ref 39–308)
TOTAL PROTEIN: 6.6 G/DL (ref 6.4–8.2)
TROPONIN: <0.01 NG/ML
WBC # BLD: 5.1 K/UL (ref 4–11)

## 2019-05-17 PROCEDURE — 2580000003 HC RX 258: Performed by: NURSE PRACTITIONER

## 2019-05-17 PROCEDURE — 70496 CT ANGIOGRAPHY HEAD: CPT

## 2019-05-17 PROCEDURE — 82140 ASSAY OF AMMONIA: CPT

## 2019-05-17 PROCEDURE — 71045 X-RAY EXAM CHEST 1 VIEW: CPT

## 2019-05-17 PROCEDURE — 93005 ELECTROCARDIOGRAM TRACING: CPT | Performed by: EMERGENCY MEDICINE

## 2019-05-17 PROCEDURE — 94761 N-INVAS EAR/PLS OXIMETRY MLT: CPT

## 2019-05-17 PROCEDURE — 71046 X-RAY EXAM CHEST 2 VIEWS: CPT

## 2019-05-17 PROCEDURE — 99291 CRITICAL CARE FIRST HOUR: CPT

## 2019-05-17 PROCEDURE — 6370000000 HC RX 637 (ALT 250 FOR IP): Performed by: INTERNAL MEDICINE

## 2019-05-17 PROCEDURE — 81003 URINALYSIS AUTO W/O SCOPE: CPT

## 2019-05-17 PROCEDURE — 70450 CT HEAD/BRAIN W/O DYE: CPT

## 2019-05-17 PROCEDURE — 82550 ASSAY OF CK (CPK): CPT

## 2019-05-17 PROCEDURE — 85610 PROTHROMBIN TIME: CPT

## 2019-05-17 PROCEDURE — 70498 CT ANGIOGRAPHY NECK: CPT

## 2019-05-17 PROCEDURE — 2580000003 HC RX 258: Performed by: INTERNAL MEDICINE

## 2019-05-17 PROCEDURE — 1200000000 HC SEMI PRIVATE

## 2019-05-17 PROCEDURE — 83735 ASSAY OF MAGNESIUM: CPT

## 2019-05-17 PROCEDURE — 83880 ASSAY OF NATRIURETIC PEPTIDE: CPT

## 2019-05-17 PROCEDURE — 84443 ASSAY THYROID STIM HORMONE: CPT

## 2019-05-17 PROCEDURE — 93010 ELECTROCARDIOGRAM REPORT: CPT | Performed by: INTERNAL MEDICINE

## 2019-05-17 PROCEDURE — 85025 COMPLETE CBC W/AUTO DIFF WBC: CPT

## 2019-05-17 PROCEDURE — 6360000004 HC RX CONTRAST MEDICATION: Performed by: EMERGENCY MEDICINE

## 2019-05-17 PROCEDURE — 36415 COLL VENOUS BLD VENIPUNCTURE: CPT

## 2019-05-17 PROCEDURE — 96360 HYDRATION IV INFUSION INIT: CPT

## 2019-05-17 PROCEDURE — 2580000003 HC RX 258: Performed by: PHYSICIAN ASSISTANT

## 2019-05-17 PROCEDURE — 84484 ASSAY OF TROPONIN QUANT: CPT

## 2019-05-17 PROCEDURE — 2500000003 HC RX 250 WO HCPCS: Performed by: INTERNAL MEDICINE

## 2019-05-17 PROCEDURE — 2700000000 HC OXYGEN THERAPY PER DAY

## 2019-05-17 PROCEDURE — 6360000002 HC RX W HCPCS: Performed by: NURSE PRACTITIONER

## 2019-05-17 PROCEDURE — 80053 COMPREHEN METABOLIC PANEL: CPT

## 2019-05-17 RX ORDER — CITALOPRAM 10 MG/1
10 TABLET ORAL DAILY
COMMUNITY

## 2019-05-17 RX ORDER — ROSUVASTATIN CALCIUM 20 MG/1
20 TABLET, COATED ORAL DAILY
Status: DISCONTINUED | OUTPATIENT
Start: 2019-05-17 | End: 2019-05-20 | Stop reason: HOSPADM

## 2019-05-17 RX ORDER — 0.9 % SODIUM CHLORIDE 0.9 %
500 INTRAVENOUS SOLUTION INTRAVENOUS ONCE
Status: COMPLETED | OUTPATIENT
Start: 2019-05-17 | End: 2019-05-17

## 2019-05-17 RX ORDER — SODIUM CHLORIDE 9 MG/ML
INJECTION, SOLUTION INTRAVENOUS
Status: DISPENSED
Start: 2019-05-17 | End: 2019-05-18

## 2019-05-17 RX ORDER — LEVETIRACETAM 5 MG/ML
500 INJECTION INTRAVASCULAR EVERY 12 HOURS
Status: DISCONTINUED | OUTPATIENT
Start: 2019-05-17 | End: 2019-05-17 | Stop reason: RX

## 2019-05-17 RX ORDER — TAMSULOSIN HYDROCHLORIDE 0.4 MG/1
0.8 CAPSULE ORAL NIGHTLY
Status: DISCONTINUED | OUTPATIENT
Start: 2019-05-17 | End: 2019-05-20 | Stop reason: HOSPADM

## 2019-05-17 RX ORDER — LABETALOL 20 MG/4 ML (5 MG/ML) INTRAVENOUS SYRINGE
5 EVERY 4 HOURS PRN
Status: DISCONTINUED | OUTPATIENT
Start: 2019-05-17 | End: 2019-05-20 | Stop reason: HOSPADM

## 2019-05-17 RX ORDER — VITAMIN B COMPLEX
1 CAPSULE ORAL DAILY
Status: DISCONTINUED | OUTPATIENT
Start: 2019-05-17 | End: 2019-05-20 | Stop reason: HOSPADM

## 2019-05-17 RX ORDER — ACETAMINOPHEN 325 MG/1
650 TABLET ORAL EVERY 4 HOURS PRN
Status: DISCONTINUED | OUTPATIENT
Start: 2019-05-17 | End: 2019-05-20 | Stop reason: HOSPADM

## 2019-05-17 RX ORDER — FERROUS SULFATE 325(65) MG
325 TABLET ORAL 2 TIMES DAILY WITH MEALS
Status: DISCONTINUED | OUTPATIENT
Start: 2019-05-17 | End: 2019-05-20 | Stop reason: HOSPADM

## 2019-05-17 RX ORDER — SODIUM CHLORIDE 0.9 % (FLUSH) 0.9 %
10 SYRINGE (ML) INJECTION PRN
Status: DISCONTINUED | OUTPATIENT
Start: 2019-05-17 | End: 2019-05-20 | Stop reason: HOSPADM

## 2019-05-17 RX ORDER — LEVOTHYROXINE SODIUM 0.1 MG/1
100 TABLET ORAL
Status: DISCONTINUED | OUTPATIENT
Start: 2019-05-18 | End: 2019-05-20 | Stop reason: HOSPADM

## 2019-05-17 RX ORDER — SODIUM CHLORIDE 0.9 % (FLUSH) 0.9 %
10 SYRINGE (ML) INJECTION EVERY 12 HOURS SCHEDULED
Status: DISCONTINUED | OUTPATIENT
Start: 2019-05-17 | End: 2019-05-20 | Stop reason: HOSPADM

## 2019-05-17 RX ORDER — ONDANSETRON 2 MG/ML
4 INJECTION INTRAMUSCULAR; INTRAVENOUS EVERY 6 HOURS PRN
Status: DISCONTINUED | OUTPATIENT
Start: 2019-05-17 | End: 2019-05-20 | Stop reason: HOSPADM

## 2019-05-17 RX ORDER — LANOLIN ALCOHOL/MO/W.PET/CERES
3 CREAM (GRAM) TOPICAL NIGHTLY
COMMUNITY

## 2019-05-17 RX ORDER — POLYVINYL ALCOHOL 14 MG/ML
1 SOLUTION/ DROPS OPHTHALMIC 2 TIMES DAILY
Status: DISCONTINUED | OUTPATIENT
Start: 2019-05-17 | End: 2019-05-20 | Stop reason: HOSPADM

## 2019-05-17 RX ORDER — POLYETHYLENE GLYCOL 3350 17 G/17G
17 POWDER, FOR SOLUTION ORAL DAILY
COMMUNITY

## 2019-05-17 RX ADMIN — LABETALOL 20 MG/4 ML (5 MG/ML) INTRAVENOUS SYRINGE 5 MG: at 22:39

## 2019-05-17 RX ADMIN — SODIUM CHLORIDE 500 ML: 9 INJECTION, SOLUTION INTRAVENOUS at 11:50

## 2019-05-17 RX ADMIN — TAMSULOSIN HYDROCHLORIDE 0.8 MG: 0.4 CAPSULE ORAL at 21:50

## 2019-05-17 RX ADMIN — Medication 10 ML: at 21:50

## 2019-05-17 RX ADMIN — IOPAMIDOL 75 ML: 755 INJECTION, SOLUTION INTRAVENOUS at 11:35

## 2019-05-17 RX ADMIN — LEVETIRACETAM 500 MG: 100 INJECTION, SOLUTION INTRAVENOUS at 21:50

## 2019-05-17 ASSESSMENT — PAIN DESCRIPTION - LOCATION: LOCATION: HEAD

## 2019-05-17 ASSESSMENT — PAIN DESCRIPTION - PAIN TYPE: TYPE: ACUTE PAIN

## 2019-05-17 ASSESSMENT — PAIN SCALES - GENERAL: PAINLEVEL_OUTOF10: 0

## 2019-05-17 ASSESSMENT — PAIN DESCRIPTION - ORIENTATION: ORIENTATION: RIGHT

## 2019-05-17 ASSESSMENT — PAIN SCALES - WONG BAKER: WONGBAKER_NUMERICALRESPONSE: 6

## 2019-05-17 NOTE — ED NOTES
F[address] 2 called @ 832 [facility] N/S transfer for subdural per Dr. Cisco Chan returned call @ [phone] spoke to Bharat, [address] Severiano Pennant Naegele  05/17/19 5226

## 2019-05-17 NOTE — ED NOTES
Patient stating he needs to use the restroom. RN and ED tech at patient bedside. When given the urinal patient threw it across the room and said \"NO! \" he was asked if he had to poop, and pt replied yes. Two person assist to bedside commode was unsuccessful, patient stated \"I can't do it. \" Patient back in bed and placed on bed pan. Patient again stated, \"I can't do it. \" bed pan removed, and patient adjusted in bed for comfort. Warm blankets applied along with a pillow.       Med Acosta RN  05/17/19 9038

## 2019-05-17 NOTE — ED NOTES
Called N/S consult @5040  Re: acute on chronic subdural hematoma per Brenda Jimenez returned call @ [phone], spoke to Bahrat, [address] Barnie Duane  05/17/19 4801

## 2019-05-17 NOTE — ED NOTES
Called Mary Jane to give nurse to nurse report.  Duong Kaur will be calling back     [facility], RN  05/17/19 0508

## 2019-05-17 NOTE — H&P
display       ASSESSMENT AND PLAN:    80year old with PMx CVA with right hemiparesis, admitted for subdural hematoma. Unclear etiology for his most recent fall.  Work up as follows     Subdural hematoma   - re-peat CT head scheduled for 2000  - SLP ordered, PT/OT consult when appropriate  - keep SBP  - keppra 500mg BID   - SBP <160 PRN 5mg IV labetalol  - Vit D, CK, TSH, chest x-ray, ammonia    Hypothyroidism  - continue home Levothyroxine 100mcg daily     Palliative care consult   - goals of care, code status discussion     Will discuss with attending physician Dr. Robert Silva and medical team    Code Status: Full code  FEN: sips with meds, NPO  PPX: SCD  DISPO: 5T    Ita Siegel MD, PGY- 1    5/17/2019,  4:47 PM

## 2019-05-17 NOTE — ED PROVIDER NOTES
11/28/2013    Thyroid disease     Unspecified cerebral artery occlusion with cerebral infarction     Right sided weakness - uses cane         SURGICAL HISTORY     Past Surgical History:   Procedure Laterality Date    APPENDECTOMY      CARDIAC SURGERY      stents 11/11    CAROTID ENDARTERECTOMY Left 11-    CHOLECYSTECTOMY, LAPAROSCOPIC  1-22-15    CORONARY ARTERY BYPASS GRAFT  2009    FRACTURE SURGERY           CURRENTMEDICATIONS       Discharge Medication List as of 5/17/2019  3:39 PM      CONTINUE these medications which have NOT CHANGED    Details   tamsulosin (FLOMAX) 0.4 MG capsule Take 2 capsules by mouth nightly, Disp-30 capsule, R-3Normal      Mirabegron ER (MYRBETRIQ) 50 MG TB24 Take 50 mg by mouth dailyHistorical Med      rosuvastatin (CRESTOR) 20 MG tablet Take 20 mg by mouth dailyHistorical Med      b complex vitamins capsule Take 1 capsule by mouth dailyHistorical Med      ibuprofen (ADVIL;MOTRIN) 200 MG tablet Take 400 mg by mouth every 8 hours as needed for PainHistorical Med      magnesium hydroxide (MILK OF MAGNESIA) 400 MG/5ML suspension Take 30 mLs by mouth daily as needed for ConstipationHistorical Med      sennosides-docusate sodium (SENOKOT-S) 8.6-50 MG tablet Take 1 tablet by mouth 2 times dailyHistorical Med      carboxymethylcellulose 1 % ophthalmic solution Place 1 drop into both eyes 2 times dailyHistorical Med      desoximetasone (TOPICORT) 0.25 % cream Apply topically 2 times daily as needed Apply topically 2 times daily. , Topical, 2 TIMES DAILY PRN, Historical Med      levothyroxine (SYNTHROID) 100 MCG tablet Take 1 tablet by mouth every morning (before breakfast)DC to SNF      acetaminophen (TYLENOL) 325 MG tablet Take 2 tablets by mouth every 4 hours as needed for Pain or Fever, Disp-120 tablet, R-3OTC      ferrous sulfate 325 (65 Fe) MG tablet Take 1 tablet by mouth 2 times daily (with meals), Disp-60 tablet, R-1Print      aspirin EC 81 MG EC tablet Take 1 tablet by aphasia(baseline slurred)  Dysarthria (10. ): Mild to moderate dysarthria(baseline slurred speech)Mya Coma Scale  Eye Opening: Spontaneous  Best Verbal Response: Oriented  Best Motor Response: Obeys commands  [place] Coma Scale Score: 15        PHYSICAL EXAM    (up to 7 for level 4, 8 or more for level 5)     ED Triage Vitals [05/17/19 1031]   BP Temp Temp Source Pulse Resp SpO2 Height Weight   125/64 97.5 °F (36.4 °C) Oral 59 14 92 % -- 168 lb (76.2 kg)       Physical Exam   Constitutional: He is oriented to person, place, and time. He appears well-developed and well-nourished. HENT:   Head: Normocephalic and atraumatic. Right Ear: External ear normal.   Left Ear: External ear normal.   Nose: Nose normal.   Eyes: Right eye exhibits no discharge. Left eye exhibits no discharge. Neck: Normal range of motion. Neck supple. Cardiovascular: Normal rate, regular rhythm, normal heart sounds and intact distal pulses. Exam reveals no gallop and no friction rub. No murmur heard. Pulmonary/Chest: Effort normal and breath sounds normal. No stridor. No respiratory distress. He has no wheezes. He has no rales. He exhibits no tenderness. Musculoskeletal: Normal range of motion. Neurological: He is alert and oriented to person, place, and time. He displays abnormal reflex. A cranial nerve deficit is present. He exhibits abnormal muscle tone. Coordination abnormal.   Notable for right-sided defect. NIH unable to be assessed based on the inability to compare right to left   Skin: Skin is warm and dry. He is not diaphoretic. No pallor. Psychiatric: He has a normal mood and affect. His behavior is normal.   Nursing note and vitals reviewed.       DIAGNOSTIC RESULTS   LABS:    Labs Reviewed   CBC WITH AUTO DIFFERENTIAL - Abnormal; Notable for the following components:       Result Value    RBC 4.07 (*)     Hemoglobin 13.4 (*)     Hematocrit 39.3 (*)     All other components within normal limits    Narrative: Performed at:  [address], [facility] [employer]   Phone [phone]   COMPREHENSIVE METABOLIC PANEL - Abnormal; Notable for the following components:    Glucose 116 (*)     GFR Non- 57 (*)     All other components within normal limits    Narrative:     Performed at:  [address], [facility] [place]   Phone [phone] - Abnormal; Notable for the following components:    Protime 13.1 (*)     INR 1.15 (*)     All other components within normal limits    Narrative:     Performed at:  [address], [facility] [employer]   Phone [phone]   TROPONIN    Narrative:     Performed at:  [address], [address] [employer]   Phone [phone]    Narrative:     Performed at:  [address], [address] [employer]   Phone [phone]   MAGNESIUM    Narrative:     Performed at:  [address], [address] [employer]   Phone [phone]   URINALYSIS   POCT GLUCOSE    Narrative:     Performed at:  [address], [facility] [employer]   Phone [phone]       All other labs were within normal range or not returned as of this dictation. EKG: All EKG's are interpreted by the Emergency Department Physician who either signs orCo-signs this chart in the absence of a cardiologist.  Please see their note for interpretation of EKG.       RADIOLOGY:   Non-plain film images such as CT, Ultrasound and MRI are read by the radiologist. Plain radiographic images are visualized andpreliminarily interpreted by the  ED Provider with the below findings:        Interpretation perthe Radiologist below, if available at the time of this radiation dose to as low as reasonably achievable. COMPARISON: None. HISTORY: ORDERING SYSTEM PROVIDED HISTORY: weakness and syncope TECHNOLOGIST PROVIDED HISTORY: Has a \"code stroke\" or \"stroke alert\" been called? ->No Ordering Physician Provided Reason for Exam: pt became orthostatic during therapy, passed out, bp was 71/43, speech is always slurred, right sided paralysis from previous stroke., blurred vision Acuity: Acute Type of Exam: Initial Mechanism of Injury: hx of stroke and seizure FINDINGS: BRAIN/VENTRICLES: Large mostly isodense left-sided extra-axial collection measuring approximately 3.8 cm in thickness compatible with a small amount of hyperdensity inferiorly which may indicate acute component. Small amount of hyperdensity along the anterior interhemispheric fissure also concerning for acute subdural hemorrhage. Lucencies within the periventricular and subcortical white matter likely represent chronic microvascular ischemic changes. The gray-white differentiation is maintained without evidence of an acute infarct. There is no evidence of hydrocephalus. ORBITS: The visualized portion of the orbits demonstrate no acute abnormality. SINUSES: The visualized paranasal sinuses and mastoid air cells demonstrate no acute abnormality. SOFT TISSUES/SKULL:  No acute abnormality of the visualized skull or soft tissues. Large isodense subdural hematoma along the left superolateral convexity indicating subacute time frame. Small amount of hyperdensity within the collection inferiorly and anteriorly suspicious for acute component. Mild midline shift to the right by 5 mm. Small amount of hyperdensity along the anterior interhemispheric fissure consistent with acute subdural hemorrhage. Chronic microvascular ischemic changes. Critical results were called by Dr. Polina Mehta to Dr. Rehana Collado on 5/17/2019 at 11:39.      Xr Chest Portable    Result Date: 5/17/2019  EXAMINATION: ONE XRAY VIEW OF THE CHEST 5/17/2019 10:48 am COMPARISON: Prior study(s) most recent 12/19/2018. HISTORY: ORDERING SYSTEM PROVIDED HISTORY: SYNCOPE TECHNOLOGIST PROVIDED HISTORY: Reason for exam:->SYNCOPE Ordering Physician Provided Reason for Exam: loc Acuity: Acute Type of Exam: Initial FINDINGS: The heart is upper normal borderline enlarged size. Pulmonary vessels are borderline congested. These findings are somewhat accentuated with low lung volume. There is patchy airspace disease at the lung bases greater on the left. Left costophrenic angle is blunted, possible small effusion. Sternotomy wires are seen from prior surgery. Borderline cardiomegaly and vascular congestion with basilar patchy airspace disease and trace pleural effusion suspected on the left. This could all be due to mild congestive failure. RECOMMENDATION: Recommend follow-up PA and lateral views with full inspiration. Cta Neck W Contrast    Result Date: 5/17/2019  EXAMINATION: CTA OF THE NECK; CTA OF THE HEAD WITH CONTRAST 5/17/2019 11:26 am: TECHNIQUE: CTA of the neck was performed with the administration of intravenous contrast. Multiplanar reformatted images are provided for review. MIP images are provided for review. Stenosis of the internal carotid arteries measured using NASCET criteria. Dose modulation, iterative reconstruction, and/or weight based adjustment of the mA/kV was utilized to reduce the radiation dose to as low as reasonably achievable.; CTA of the head/brain was performed with the administration of intravenous contrast. Multiplanar reformatted images are provided for review. MIP images are provided for review. Dose modulation, iterative reconstruction, and/or weight based adjustment of the mA/kV was utilized to reduce the radiation dose to as low as reasonably achievable. COMPARISON: 12/19/2018.  HISTORY: ORDERING SYSTEM PROVIDED HISTORY: weakness and syncope TECHNOLOGIST PROVIDED HISTORY: Has a \"code stroke\" or \"stroke alert\" been CIRCULATION: The posterior cerebral arteries demonstrate no focal stenosis. The vertebral and basilar arteries appear unremarkable. No intracranial aneurysm identified. BRAIN: There is a large left subdural hematoma, which appear similar to the earlier CT. There is left-to-right bowing of the midline structures. 1. No flow limiting stenosis of the cervical ICAs by NASCET criteria. 2. Evidence of prior left carotid endarterectomy. 3. Moderate stenosis at the origin of the right vertebral artery. 4. Moderate focal stenosis involving a proximal left M2 branch. 5. Large left subdural hematoma similar to the earlier CT. Cta Head W Contrast    Result Date: 5/17/2019  EXAMINATION: CTA OF THE NECK; CTA OF THE HEAD WITH CONTRAST 5/17/2019 11:26 am: TECHNIQUE: CTA of the neck was performed with the administration of intravenous contrast. Multiplanar reformatted images are provided for review. MIP images are provided for review. Stenosis of the internal carotid arteries measured using NASCET criteria. Dose modulation, iterative reconstruction, and/or weight based adjustment of the mA/kV was utilized to reduce the radiation dose to as low as reasonably achievable.; CTA of the head/brain was performed with the administration of intravenous contrast. Multiplanar reformatted images are provided for review. MIP images are provided for review. Dose modulation, iterative reconstruction, and/or weight based adjustment of the mA/kV was utilized to reduce the radiation dose to as low as reasonably achievable. COMPARISON: 12/19/2018. HISTORY: ORDERING SYSTEM PROVIDED HISTORY: weakness and syncope TECHNOLOGIST PROVIDED HISTORY: Has a \"code stroke\" or \"stroke alert\" been called? ->No Ordering Physician Provided Reason for Exam: (pt became orthostatic during therapy, passed out, bp was 71/43, speech is always slurred, right sided paralysis from previous stroke, blurred vision Acuity: Acute Type of Exam: Initial Additional signs

## 2019-05-17 NOTE — CONSULTS
NEUROSURGERY CONSULT NOTE    Fortino Part  [id number]   12/15/1931   2019    Requesting physician: Tyler Fernandez MD    Reason for consultation: SDH    History of present illness: Patient is a 80 y.o. male w/ PMH of CVA w/right side hemiparesis who presented on 2019 to [facility] ED s/p syncopal event. 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 Patient is somewhat difficult to understand at baseline with speech difficulty from his previous stroke. CT Head revealed acute on chronic SDH on left side. ROS:   GENERAL:  Denies fever or recent illness.  Denies weight changes   EYES:  Denies vision change or diplopia  EARS:  Denies hearing loss  CARDIAC:  Denies chest pain  RESPIRATORY:  Denies shortness of breath  SKIN:  Denies rash or lesions   HEM:  Denies excessive bruising  PSYCH:  Denies anxiety or depression  NEURO:  Endorses chronic right sided weakness & numbness and headache; Denies numbness or tingling or lateralizing weakness elsewhere  :  Denies urinary difficulty  GI: Denies nausea, vomiting, diarrhea or constipation  MUSCULOSKELETAL:  No arthralgias    No Known Allergies    Past Medical History:   Diagnosis Date    Coronary artery disease     CVA (cerebral infarction)     L frontal (2013)    Hypercholesteremia     Seizure, petit mal ([address] 2013    Thyroid disease     Unspecified cerebral artery occlusion with cerebral infarction     Right sided weakness - uses cane        Past Surgical History:   Procedure Laterality Date    APPENDECTOMY      CARDIAC SURGERY      stents     CAROTID ENDARTERECTOMY Left 2013    CHOLECYSTECTOMY, LAPAROSCOPIC  1-22-15    CORONARY ARTERY BYPASS GRAFT  2009    FRACTURE SURGERY         Social History     Occupational History    Not on file   Tobacco Use    Smoking status: Former Smoker     Packs/day: 0.50     Years: 60.00     Pack years: 30.00     Last attempt to quit: 2013     Years since quittin.5    mL IVPB  500 mg Intravenous Q12H Santy Allenkaycee, APRN - CNP            Objective:  SpO2 96%     Physical Exam:   Patient seen and examined  GCS:  4 - Opens eyes on own  5 - Alert and oriented  6 - Follows simple motor commands  General: Well developed. Alert and cooperative in no acute distress. HENT: atraumatic, neck supple  Eyes: Optic discs: Not tested  Pulmonary: unlabored respiratory effort  Cardiovascular:  Warm well perfused. No peripheral edema  Gastrointestinal: abdomen soft, NT, ND    Neurological:  Mental Status: Awake, alert, oriented to self, \"hospital\" and aware of \"passing out\", but does not know year; hard to understand at times 2/2 dysarthria  Attention: Intact  Language: Dysarthria noted  Sensation: Intact to all extremities to light touch  Coordination: Intact    Cranial Nerves:  Cranial Nerves:  II: Visual acuity not tested, denies new visual changes / diplopia  III, IV, VI: PERRL, 3 mm bilaterally, EOMI, no nystagmus noted  V: Facial sensation intact bilaterally to touch  VII: Face symmetric  VIII: Hearing intact bilaterally to spoken voice  IX: Palate movement equal bilaterally  XI: Shoulder shrug equal bilaterally  XII: Tongue midline    Musculoskeletal:   Gait: Not tested   Assist devices: None   Tone: Right side flaccid; Left side normal  Motor strength:    Right  Left    Right  Left    Deltoid  0 5  Hip Flex  3- 5   Biceps  0 5  Knee Extensors  3- 5   Triceps  0 5  Knee Flexors  3- 5   Wrist Ext  0 5  Ankle Dorsiflex. 3- 5   Wrist Flex  0 5  Ankle Plantarflex. 3- 5   Handgrip  0 5  Ext Domo Longus  3- 5   Thumb Ext  0 5         Radiological Findings:  Ct Head Wo Contrast  Result Date: 5/17/2019  Large isodense subdural hematoma along the left superolateral convexity indicating subacute time frame. Small amount of hyperdensity within the collection inferiorly and anteriorly suspicious for acute component. Mild midline shift to the right by 5 mm.  Small amount of hyperdensity along the anterior interhemispheric fissure consistent with acute subdural hemorrhage. Chronic microvascular ischemic changes. Cta Head W Contrast & Cta Neck W Contrast  Result Date: 5/17/2019  1. No flow limiting stenosis of the cervical ICAs by NASCET criteria. 2. Evidence of prior left carotid endarterectomy. 3. Moderate stenosis at the origin of the right vertebral artery. 4. Moderate focal stenosis involving a proximal left M2 branch. 5. Large left subdural hematoma similar to the earlier CT.      Labs:  Recent Labs     05/17/19  1030   WBC 5.1   HGB 13.4*   HCT 39.3*          Recent Labs     05/17/19  1030      K 4.9      CO2 28   BUN 19   CREATININE 1.2   GLUCOSE 116*   CALCIUM 9.7   MG 2.20       Recent Labs     05/17/19  1030   PROTIME 13.1*   INR 1.15*       Patient Active Problem List    Diagnosis Date Noted    Intracranial hemorrhage ([address] 05/17/2019    Chronic dementia, with behavioral disturbance 12/22/2018    Vomiting 12/19/2018    Centrilobular emphysema (HCC)     Acute ischemic stroke (HCC)     Chronic cor pulmonale (HCC)     Slurred speech     Arterial ischemic stroke, ICA, right, acute ([address]     Remote history of stroke     HTN (hypertension), benign     CAD in native artery     Atelectasis 09/17/2017    Elevated blood pressure reading 09/17/2017    Hypothyroidism due to acquired atrophy of thyroid 09/17/2017    Debility 09/17/2017    Dyslipidemia 09/17/2017    Cerebrovascular accident (CVA) ([address]     Aphagia 06/20/2017    Abdominal aortic aneurysm (AAA) without rupture ([address] 06/20/2017    Leukocytosis 06/20/2017    Physical deconditioning 06/19/2017    Frequent falls 06/19/2017    Left-sided weakness 06/19/2017    Hypothyroidism     Cerebral artery occlusion with cerebral infarction ([address]     Otitis externa 07/28/2015    GI bleeding 07/27/2015    Right upper quadrant pain 01/02/2015    Intraparenchymal hemorrhage of brain ([address] 02/06/2014    Hospital psychosis [facility]) 12/03/2013    Seizure, petit mal ([facility] 11/28/2013    Cerebral infarction ([facility] 11/22/2013    Coronary artery disease involving native coronary artery of native heart without angina pectoris 11/22/2013    Ataxia 11/22/2013       Assessment:  Patient is a 80 y.o. male w/large acute on chronic left side SDH. Plan:  1. No emergent neurosurgical intervention indicated  2. Neurologic exams frequency:  - Floor: Q4H  3. For change in exam MUST contact neurosurgery team along with critical care or primary team  4. SDH:  - CT Head w/o contrast 6 hours from previous scan  - Maintain SBP <160; If PRN med insufficient, then may start Nicardipine infusion  - Keep Plt >100k & INR <1.4  - Hold all full dose anticoagulation & antiplatelet for 2 weeks  5. Cerebral edema prophylaxis:  - Keep Na within normal limits  - HOB >30 degrees  - NO dextrose in IVF's or in IV drips  - If central venous access is needed please use subclavian vs femoral - No IJ as this can decrease venous return and worsen cerebral edema  6. SCDs for DVT prophylaxis  7. Seizure prophylaxis: Keppra 500mg BID x7 days  8. Pain: Managed by medical team  9. Speech consulted for swallow eval, appreciate recs  10. PT/OT consulted, appreciate recs  11. Advance diet / activity per primary team  12. Thank you for consult. Will follow inpatient. Please call with any questions or decline in neurological status    DISPO: Remain inpatient from neurosurgery standpoint. Dispo timing to be determined by primary team once patient is medically stable for discharge. Patient was discussed with Dr. Brenda Zelaya who agrees with above assessment and plan.      Electronically signed by: TERRENCE Cervantes, 5/17/2019 5:50 PM  [phone]

## 2019-05-17 NOTE — H&P
Internal Medicine PGY-*** Resident History & Physical      PCP: Racheal Mejia MD    Date of Admission: 5/17/2019    Chief Complaint:  Syncope      History Of Present Illness:       Past Medical History:          Diagnosis Date    Coronary artery disease     CVA (cerebral infarction)     L frontal (11/2013)    Hypercholesteremia     Seizure, petit mal ([address] 11/28/2013    Thyroid disease     Unspecified cerebral artery occlusion with cerebral infarction     Right sided weakness - uses cane       Past Surgical History:          Procedure Laterality Date    APPENDECTOMY      CARDIAC SURGERY      stents 11/11    CAROTID ENDARTERECTOMY Left 11-    CHOLECYSTECTOMY, LAPAROSCOPIC  1-22-15    CORONARY ARTERY BYPASS GRAFT  2009    FRACTURE SURGERY         Medications Prior to Admission:      Prior to Admission medications    Medication Sig Start Date End Date Taking?  Authorizing Provider   polyethylene glycol (GLYCOLAX) packet Take 17 g by mouth daily   Yes Historical Provider, MD   citalopram (CELEXA) 10 MG tablet Take 10 mg by mouth daily   Yes Historical Provider, MD   melatonin 3 MG TABS tablet Take 3 mg by mouth nightly   Yes Historical Provider, MD   ibuprofen (ADVIL;MOTRIN) 200 MG tablet Take 400 mg by mouth every 8 hours as needed for Pain   Yes Historical Provider, MD   acetaminophen (TYLENOL) 325 MG tablet Take 2 tablets by mouth every 4 hours as needed for Pain or Fever 7/5/17  Yes Fermin Carter MD   tamsulosin [facility]) 0.4 MG capsule Take 2 capsules by mouth nightly 83/38/47   [place], APRN - CNP   Mirabegron ER (MYRBETRIQ) 50 MG TB24 Take 50 mg by mouth daily    Historical Provider, MD   rosuvastatin (CRESTOR) 20 MG tablet Take 20 mg by mouth daily    Historical Provider, MD   b complex vitamins capsule Take 1 capsule by mouth daily    Historical Provider, MD   magnesium hydroxide (MILK OF MAGNESIA) 400 MG/5ML suspension Take 30 mLs by mouth daily as needed for Constipation Historical Provider, MD   levothyroxine (SYNTHROID) 100 MCG tablet Take 1 tablet by mouth every morning (before breakfast) 9/21/17   Timothy Aviles MD   ferrous sulfate 325 (65 Fe) MG tablet Take 1 tablet by mouth 2 times daily (with meals) 7/5/17   Shaun Brunner, MD   aspirin EC 81 MG EC tablet Take 1 tablet by mouth daily 7/29/15   Balaji Brooks MD       Allergies:  Patient has no known allergies. Social History:      The patient currently lives ***    TOBACCO:   reports that he quit smoking about 5 years ago. He has a 30.00 pack-year smoking history. He has never used smokeless tobacco.  ETOH:  reports that he does not drink alcohol. Family History:     *** Reviewed in detail and negative for DM, CAD, Cancer, CVA. Positive as follows:    History reviewed. No pertinent family history. REVIEW OF SYSTEMS: Pertinent positives as noted in the HPI. All other systems reviewed and negative. ROS: Review of Systems    PHYSICAL EXAM PERFORMED:    SpO2 96%     General appearance:  No apparent distress, appears stated age and cooperative. HEENT:  Normal cephalic,atraumatic without obvious deformity. Pupils equal, round, and reactive to light. Slow left lateral gaze, other ocular muscles intact,  Conjunctivae/corneas clear. Neck: Supple, with full range of motion. No jugular venous distention. Trachea midline. Respiratory:  Normal respiratory effort. Clear to auscultation, bilaterally without Rales/Wheezes/Rhonchi. Cardiovascular:  Regular rate and rhythm with normal S1/S2 without murmurs, rubs or gallops. Abdomen: Soft, non-tender, non-distended with normal bowel sounds. Musculoskeletal:  RLE fully extended and RUE fully flexed, 0/5 muscle strength in RUE and RLE. 5/5 LUE and LLE muscle strength, ROM intact on left sided extremities. Skin: Skin color, texture, turgor normal.  No rashes or lesions. Neurologic: Dysarthric and slight right sided facial droop.  Cranial nerves: CN V facial sensation will discuss the patient with the senior resident and MD Oswaldo Wolfe MD/DO  Internal Medicine Resident,PGY-***  Pager: [phone]

## 2019-05-17 NOTE — ED NOTES
Paged  stroke team @[phone]  [email or web address] [address]  05/17/19 4738

## 2019-05-17 NOTE — PROGRESS NOTES
From [facility]- able to communicate with yes or no questions or I dont know answer. Aphasic is baseline from the report given to this nurse however patient says its worse. Patient continent of bowel and bladder but did have incontinent episode of urine as was standing on stedy in front of BSC before sitting. able to show this nurse approp way to use call light for asst. N.O Ct head and MRI this evening. Hospitalist here addressing DNR states wife says he is to be but this facility did not receive any info about this- will stay full code - pallative care consult for tomorrow will contact family.

## 2019-05-18 LAB
ANION GAP SERPL CALCULATED.3IONS-SCNC: 12 MMOL/L (ref 3–16)
BASOPHILS ABSOLUTE: 0 K/UL (ref 0–0.2)
BASOPHILS RELATIVE PERCENT: 0.8 %
BILIRUBIN URINE: NEGATIVE
BLOOD, URINE: NEGATIVE
BUN BLDV-MCNC: 19 MG/DL (ref 7–20)
CALCIUM SERPL-MCNC: 9.3 MG/DL (ref 8.3–10.6)
CHLORIDE BLD-SCNC: 105 MMOL/L (ref 99–110)
CLARITY: CLEAR
CO2: 23 MMOL/L (ref 21–32)
COLOR: YELLOW
CREAT SERPL-MCNC: 0.8 MG/DL (ref 0.8–1.3)
EOSINOPHILS ABSOLUTE: 0.2 K/UL (ref 0–0.6)
EOSINOPHILS RELATIVE PERCENT: 3.3 %
GFR AFRICAN AMERICAN: >60
GFR NON-AFRICAN AMERICAN: >60
GLUCOSE BLD-MCNC: 99 MG/DL (ref 70–99)
GLUCOSE URINE: NEGATIVE MG/DL
HCT VFR BLD CALC: 37.2 % (ref 40.5–52.5)
HEMOGLOBIN: 12.3 G/DL (ref 13.5–17.5)
KETONES, URINE: 15 MG/DL
LEUKOCYTE ESTERASE, URINE: NEGATIVE
LYMPHOCYTES ABSOLUTE: 1.3 K/UL (ref 1–5.1)
LYMPHOCYTES RELATIVE PERCENT: 25 %
MCH RBC QN AUTO: 32.2 PG (ref 26–34)
MCHC RBC AUTO-ENTMCNC: 32.9 G/DL (ref 31–36)
MCV RBC AUTO: 97.9 FL (ref 80–100)
MICROSCOPIC EXAMINATION: ABNORMAL
MONOCYTES ABSOLUTE: 0.5 K/UL (ref 0–1.3)
MONOCYTES RELATIVE PERCENT: 10.5 %
NEUTROPHILS ABSOLUTE: 3.1 K/UL (ref 1.7–7.7)
NEUTROPHILS RELATIVE PERCENT: 60.4 %
NITRITE, URINE: NEGATIVE
PDW BLD-RTO: 13.7 % (ref 12.4–15.4)
PH UA: 6.5 (ref 5–8)
PLATELET # BLD: 209 K/UL (ref 135–450)
PMV BLD AUTO: 7.5 FL (ref 5–10.5)
POTASSIUM SERPL-SCNC: 4.1 MMOL/L (ref 3.5–5.1)
PROTEIN UA: NEGATIVE MG/DL
RBC # BLD: 3.8 M/UL (ref 4.2–5.9)
SODIUM BLD-SCNC: 140 MMOL/L (ref 136–145)
SPECIFIC GRAVITY UA: 1.01 (ref 1–1.03)
TSH REFLEX: 2.54 UIU/ML (ref 0.27–4.2)
URINE TYPE: ABNORMAL
UROBILINOGEN, URINE: 2 E.U./DL
WBC # BLD: 5.2 K/UL (ref 4–11)

## 2019-05-18 PROCEDURE — 85025 COMPLETE CBC W/AUTO DIFF WBC: CPT

## 2019-05-18 PROCEDURE — 80048 BASIC METABOLIC PNL TOTAL CA: CPT

## 2019-05-18 PROCEDURE — 6370000000 HC RX 637 (ALT 250 FOR IP): Performed by: INTERNAL MEDICINE

## 2019-05-18 PROCEDURE — 6360000002 HC RX W HCPCS: Performed by: NURSE PRACTITIONER

## 2019-05-18 PROCEDURE — 36415 COLL VENOUS BLD VENIPUNCTURE: CPT

## 2019-05-18 PROCEDURE — 92610 EVALUATE SWALLOWING FUNCTION: CPT

## 2019-05-18 PROCEDURE — 1200000000 HC SEMI PRIVATE

## 2019-05-18 PROCEDURE — 2580000003 HC RX 258: Performed by: NURSE PRACTITIONER

## 2019-05-18 PROCEDURE — 2580000003 HC RX 258: Performed by: INTERNAL MEDICINE

## 2019-05-18 PROCEDURE — 92523 SPEECH SOUND LANG COMPREHEN: CPT

## 2019-05-18 RX ADMIN — LEVOTHYROXINE SODIUM 100 MCG: 100 TABLET ORAL at 05:49

## 2019-05-18 RX ADMIN — Medication 325 MG: at 17:04

## 2019-05-18 RX ADMIN — LEVETIRACETAM 500 MG: 100 INJECTION, SOLUTION INTRAVENOUS at 21:22

## 2019-05-18 RX ADMIN — Medication 325 MG: at 09:33

## 2019-05-18 RX ADMIN — TAMSULOSIN HYDROCHLORIDE 0.8 MG: 0.4 CAPSULE ORAL at 21:22

## 2019-05-18 RX ADMIN — Medication 1 CAPSULE: at 09:33

## 2019-05-18 RX ADMIN — LEVETIRACETAM 500 MG: 100 INJECTION, SOLUTION INTRAVENOUS at 09:33

## 2019-05-18 RX ADMIN — POLYVINYL ALCOHOL 1 DROP: 14 SOLUTION/ DROPS OPHTHALMIC at 09:33

## 2019-05-18 RX ADMIN — ROSUVASTATIN CALCIUM 20 MG: 20 TABLET, COATED ORAL at 09:33

## 2019-05-18 RX ADMIN — POLYVINYL ALCOHOL 1 DROP: 14 SOLUTION/ DROPS OPHTHALMIC at 21:22

## 2019-05-18 RX ADMIN — Medication 10 ML: at 09:34

## 2019-05-18 RX ADMIN — ACETAMINOPHEN 650 MG: 325 TABLET ORAL at 01:54

## 2019-05-18 RX ADMIN — Medication 10 ML: at 21:22

## 2019-05-18 ASSESSMENT — PAIN SCALES - GENERAL
PAINLEVEL_OUTOF10: 5
PAINLEVEL_OUTOF10: 0

## 2019-05-18 NOTE — PROGRESS NOTES
Pt resting in bed, eyes closed, breathing easily, and in no signs of distress. Call light in reach. Will continue to monitor.   Moira Sim

## 2019-05-18 NOTE — PROGRESS NOTES
Pt requested to have his briefs off. The pt gets frustrated easily because he cannot express himself easily, pt was finally repositioned the way he wanted to be, and appears comfortable now and denies any other needs at this time. Pt's wife and son in the room visiting and asking if he can eat, speech just saw the pt and said he could be on a general diet, message sent to the on call medical resident for that diet order if ok with them.   Heather Perla

## 2019-05-18 NOTE — PROGRESS NOTES
Assisted pt up to the bathroom with two assist and the \"stedy. \"  Pt urinated and then returned back to bed. Call light in reach and bed alarm on.   Amber Stanford

## 2019-05-18 NOTE — PROGRESS NOTES
Speech Language Pathology  Facility/Department: [facility] 5T ORTHO/NEURO   CLINICAL BEDSIDE SWALLOW EVALUATION    NAME: Susan Wilkinson  : 12/15/1931  MRN: [id number]    ADMISSION DATE: 2019  ADMITTING DIAGNOSIS: has Cerebral infarction ([address] Coronary artery disease involving native coronary artery of native heart without angina pectoris; Ataxia; Seizure, petit mal ([address] Hospital psychosis [facility]); Intraparenchymal hemorrhage of brain ([address] Right upper quadrant pain; GI bleeding; Otitis externa; Physical deconditioning; Frequent falls; Hypothyroidism; Cerebral artery occlusion with cerebral infarction ([address] Left-sided weakness; Aphagia; Abdominal aortic aneurysm (AAA) without rupture ([address] Leukocytosis; Atelectasis; Elevated blood pressure reading; Hypothyroidism due to acquired atrophy of thyroid; Debility; Dyslipidemia; Cerebrovascular accident (CVA) ([address] Slurred speech; Arterial ischemic stroke, ICA, right, acute ([address] Remote history of stroke; HTN (hypertension), benign; CAD in native artery; Acute ischemic stroke ([address] Chronic cor pulmonale ([address] Centrilobular emphysema ([address] Vomiting; Chronic dementia, with behavioral disturbance; and Intracranial hemorrhage ([address] on their problem list.  ONSET DATE: 19    Recent Chest Xray: (19)  Impression       Bilateral interstitial prominence may represent chronic fibrosis. No acute pulmonary consolidation.        Date of Eval: 2019  Evaluating Therapist: Nataly Peck    Current Diet level:  Current Diet : NPO  Current Liquid Diet : NPO      Primary Complaint  Patient Complaint: None    Pain:  Pain Assessment  Pain Assessment: 0-10  Pain Level: 0  Olson-Block Pain Rating: Hurts even more  Patient's Stated Pain Goal: Other (Comment)  Pain Type: Acute pain  Pain Location: Head  Pain Orientation: Right  Non-Pharmaceutical Pain Intervention(s): Distraction  Response to Pain Intervention: Asleep with RR greater than 10    Reason for Referral  Sarah Do was referred for a bedside swallow evaluation to assess the efficiency of his swallow function, identify signs and symptoms of aspiration and make recommendations regarding safe dietary consistencies, effective compensatory strategies, and safe eating environment. Impression  Dysphagia Diagnosis: Swallow function appears grossly intact    Dysphagia Impression : Swallow function appears grossly intact, with effecient mastication, positive swallow initiation, good oral clearance, no overt s/s aspiration or penetration, no complaint of globus sensation for all items assessed (thins, regular, multiple whole pills administered by RN). Recommend regular diet with thin liquids, exercising general safe swallow strategies. If s/s aspiration or decline in respiratory function arise, recommend diet downgrade and contact speech. Dysphagia Outcome Severity Scale: Level 6: Within functional limits/Modified independence     Treatment Plan  Requires SLP Intervention: Yes  Duration/Frequency of Treatment: 2-3x/wk for LOS  D/C Recommendations: To be determined       Recommended Diet and Intervention  Diet Solids Recommendation: Regular  Liquid Consistency Recommendation: Thin  Recommended Form of Meds: PO  Recommendations: Dysphagia treatment  Therapeutic Interventions: Diet tolerance monitoring;Patient/Family education    Compensatory Swallowing Strategies  Compensatory Swallowing Strategies: Alternate solids and liquids;Small bites/sips;Eat/Feed slowly; Remain upright for 30-45 minutes after meals;Upright as possible for all oral intake    Treatment/Goals  Dysphagia Goals: The patient will tolerate recommended diet without observed clinical signs of aspiration; The patient/caregiver will demonstrate understanding of compensatory strategies for improved swallowing safety. General  Chart Reviewed: Yes  Comments: Per MD H&P (05/17/19): \"Mr. Sarah Do is an 80year old male with PMx of stroke who presented to an outside hospital post mechanical fall. Patient was transferred to the [facility] for neurosurgical evaluation. CT head with and with out contrast showed a large left subdural hematoma with a 5 mm midline shift to to the right. Small mount of hyperdensity within the collection inferiorly and anteriorly suspicious for acute component, moderate focal stenosis involving a proximal left M2 branch, moderate stenosis at the origin of the right vertebral artery, \"  Subjective  Subjective: Pt alert, hard of hearing, required cueing  Behavior/Cognition: Alert;Pleasant mood; Requires cueing;Confused  Respiratory Status: Room air  O2 Device: None (Room air)  Communication Observation: Dysarthria;Apraxia; Aphasia  Follows Directions: Simple  Dentition: Dentures top;Dentures bottom  Patient Positioning: Upright in bed  Baseline Vocal Quality: Normal  Volitional Cough: Strong  Prior Dysphagia History: Pt prevously seen for dysphagia during last hospitalization for stroke, with a MBSS on 9/19/17 recommending regular diet with thin liquids. Pt discharged from speech therapy at [address] [place] [address], having met goals, and recommendation for regular diet/thin liquids, small sips by straw. Consistencies Administered: Thin;Thin - cup;Reg solid    Vision/Hearing  Hearing  Hearing: Ex[facility] to [facility]  Hearing Exceptions: Hard of hearing/hearing concerns; Left hearing aid    Oral Motor Deficits  Oral/Motor  Oral Motor: Exceptions to [facility]  Labial Coordination: Reduced  Lingual Coordination: Reduced    Oral Phase Dysfunction  Oral Phase  Oral Phase: WFL     Indicators of Pharyngeal Phase Dysfunction   Pharyngeal Phase  Pharyngeal Phase: WFL    Prognosis  Prognosis  Prognosis for safe diet advancement: good  Barriers to reach goals: language deficits;cognitive deficits  Individuals consulted  Consulted and agree with results and recommendations: Patient;RN    Education  Patient Education Response: Shorty understanding;Needs reinforcement  Safety Devices in place: Yes  Type of devices: Left in bed;Call light within reach         Therapy Time  SLP Individual Minutes  Time In: 0912  Time Out: 0932  Minutes: 20     SLP Total Treatment Time  Timed Code Treatment Minutes: 0 Minutes  Total Treatment Time: 20      Plan  Diet Recommendations: Regular, thin liquids, meds whole, as tolerated    Discharge Plan:  TBD  Discussed with RN, [facility]. Needs within reach. Electronically Signed by:  Bertha Casey M.A., [address]  Speech-Language Pathologist  Speech-Language Pathologist  [address] [phone]  Pager #[phone]    This document will serve as a discharge summary if pt discharges before next treatment.

## 2019-05-18 NOTE — PROGRESS NOTES
MS3 Progress Note    PCP: Rica Montgomery MD    Date of Admission: 5/17/2019    Chief Complaint: unresponsive    Hospital Course: Mr. Jorge Leblanc is a 79 yo M /w a Hx of stroke who presented to an outside hospital post mechanical fall. Pt was transferred to [facility] for neurosurgical evaluation.      History obtained from long term facility staff:  On 5/18, pt was at physical therapy today, when he became profoundly weak and unresponsive. At that time his blood pressure was 71/43, HR 53, O2 88% and glucose 119. He was placed in Trendelenburg /w improvement in BP, SBP: 114, HR: in 50's, afebrile, placed on 3 L O2 with sat at 93%. Pt became responsive after that and denied any pain or discomfort at that time.      Of note on, Monday night patient fell, not witnessed, staff does not believe that he lost consciousness, his roommate heard patient fall \" hittig a wooden divider. \"  Through out the week, pt's mood has changed, so became more hostile and refused his meds. He has remained neurologically at baseline. Today, pt was not able to provide information due to dysarthria. Did not appear to be in distress. VS wnl. At outside ED:  CT head (5/17) w/o contrast showed subdural hematoma along the left superolateral convexity indicating subacute time frame. Small amount of hyperdensity within the collection inferiorly and anteriorly suspicious for acute component. CT head (5/18) w/o contrast showed large left cerebral convexity subdural hematoma is slightly more posteriorly compared to prior but overall unchanged in size with unchanged 5 mm rightward midline shift. Moderate focal stenosis involving a proximal left M2 branch, moderate stenosis at the origin of the right vertebral artery.      Labs:  CBC /w diff - unremarkable  UA - ketones        Medications:  Reviewed    Infusion Medications    sodium 05/17/19  1030 05/17/19 2039   CKTOTAL  --  118   TROPONINI <0.01  --        Urinalysis:      Lab Results   Component Value Date    NITRU Negative 05/17/2019    WBCUA 0-2 07/26/2015    BACTERIA 1+ 07/26/2015    RBCUA None seen 07/26/2015    BLOODU Negative 05/17/2019    SPECGRAV 1.010 05/17/2019    GLUCOSEU Negative 05/17/2019       Radiology:  XR CHEST STANDARD (2 VW)   Final Result      Bilateral interstitial prominence may represent chronic fibrosis. No acute pulmonary consolidation. CT HEAD WO CONTRAST   Final Result      1. Large left cerebral convexity subdural hematoma is slightly more posteriorly compared to prior but overall unchanged in size with unchanged 5 mm rightward midline shift. Assessment/Plan:    Rachna Barry, 80 y.o. male /w Hx CVA with right hemiparesis, admitted for subdural hematoma.  Unclear etiology for his most recent fall.      Subdural hematoma: large acute left cerebral convexity of subdural hematoma measuring up to 3.5 cm in size is redistributed slightly posteriorly  - CBC w/ diff and BMP  - SLP ordered, PT/OT consult when appropriate  - keppra 500mg BID     - SBP <160 PRN 5mg IV labetalol  - Vit D, CK, TSH, chest x-ray, ammonia     Hypothyroidism  - cont home Levothyroxine 100mcg daily      Palliative care consult   - goals of care, code status discussion           DVT Prophylaxis: SCD  Diet: Diet NPO Effective Now Exceptions are: Sips with Meds  Code Status: Full Code    Dispo - [address], [address] [facility]

## 2019-05-18 NOTE — PROGRESS NOTES
Internal Medicine PGY-1 Resident Progress Note        PCP: Ashu Lacey MD    Date of Admission: 5/17/2019    Chief Complaint: No chief complaint on file. Subjective: Patient seen and examined at bedside. No acute events overnight. This morning, patient has no complaints other than being hungry. Feels back to his normal.    Medications:  Reviewed    Infusion Medications    sodium chloride       Scheduled Medications    b complex vitamins  1 capsule Oral Daily    polyvinyl alcohol  1 drop Both Eyes BID    tamsulosin  0.8 mg Oral Nightly    rosuvastatin  20 mg Oral Daily    levothyroxine  100 mcg Oral QAM AC    ferrous sulfate  325 mg Oral BID WC    sodium chloride flush  10 mL Intravenous 2 times per day    levetiracetam  500 mg Intravenous Q12H     PRN Meds: sodium chloride flush, magnesium hydroxide, ondansetron, acetaminophen, labetalol      Intake/Output Summary (Last 24 hours) at 5/18/2019 0828  Last data filed at 5/18/2019 0600  Gross per 24 hour   Intake 250 ml   Output 0 ml   Net 250 ml       Physical Exam Performed:  /61   Pulse 69   Temp 98.4 °F (36.9 °C) (Axillary)   Resp 16   Ht 6' (1.829 m)   Wt 168 lb (76.2 kg)   SpO2 92%   BMI 22.78 kg/m²     General appearance: No acute distress  HEENT:  Normal cephalic,atraumatic without obvious deformity. Pupils equal, round, and reactive to light. Slow left lateral gaze, other ocular muscles intact,  Conjunctivae/corneas clear. Neck: Supple, with full range of motion. Respiratory:  Normal respiratory effort. Clear to auscultation, bilaterally without Rales/Wheezes/Rhonchi. Cardiovascular:  Regular rate and rhythm with normal S1/S2 without murmurs, rubs or gallops. Abdomen: Soft, non-tender, non-distended with normal bowel sounds.   Musculoskeletal:  RLE fully extended and RUE fully flexed, 0/5 muscle strength in RUE and RLE. 5/5 LUE and LLE muscle strength with full ROM    Skin: Skin color, texture, turgor normal.  No rashes or Continue ferrous sulfate 325 qd    BPH  - Flomax     DVT Prophylaxis: SCDs  Diet: Diet NPO Effective Now Exceptions are: Sips with Meds  Code Status: Full Code  Dispo - Wards     PT/OT Eval Status: none    I will discuss the patient with the senior resident and MD Derek Alford MD   Internal Medicine Resident PGY-1  Reach via [address] [place] [address]

## 2019-05-18 NOTE — PROGRESS NOTES
Speech Language Pathology  [facility]  Initial Speech/Language/Cognitive Assessment    NAME: Justine Tate  : 12/15/1931   MRN: [id number]  ADMISSION DATE: 2019  ADMITTING DIAGNOSIS: has Cerebral infarction ([address] Coronary artery disease involving native coronary artery of native heart without angina pectoris; Ataxia; Seizure, petit mal ([address] Hospital psychosis [facility]); Intraparenchymal hemorrhage of brain ([address] Right upper quadrant pain; GI bleeding; Otitis externa; Physical deconditioning; Frequent falls; Hypothyroidism; Cerebral artery occlusion with cerebral infarction ([address] Left-sided weakness; Aphagia; Abdominal aortic aneurysm (AAA) without rupture ([address] Leukocytosis; Atelectasis; Elevated blood pressure reading; Hypothyroidism due to acquired atrophy of thyroid; Debility; Dyslipidemia; Cerebrovascular accident (CVA) ([address] Slurred speech; Arterial ischemic stroke, ICA, right, acute ([address] Remote history of stroke; HTN (hypertension), benign; CAD in native artery; Acute ischemic stroke ([address] Chronic cor pulmonale ([address] Centrilobular emphysema ([address] Vomiting; Chronic dementia, with behavioral disturbance; and Intracranial hemorrhage ([address] on their problem list.  DATE ONSET: 19    Date of Eval: 2019   Evaluating Therapist: Missie Boxer, SLP    RECENT RESULTS  CT OF HEAD: (19)  Impression       1. Large left cerebral convexity subdural hematoma is slightly more posteriorly compared to prior but overall unchanged in size with unchanged 5 mm rightward midline shift.        Primary Complaint: speech difficulty    Pain:  Pain Assessment  Pain Assessment: 0-10  Pain Level: 0  Olson-Block Pain Rating: Hurts even more  Patient's Stated Pain Goal: Other (Comment)  Pain Type: Acute pain  Pain Location: Head  Pain Orientation: Right  Non-Pharmaceutical Pain Intervention(s): Distraction  Response to Pain Intervention: Asleep with RR greater than hearing, dysarthric/apraxic speech. Hearing  Hearing: Exceptions to [facility]  Hearing Exceptions: Hard of hearing/hearing concerns; Left hearing aid        Objective:     Oral/Motor  Oral Motor: Exceptions to [facility]  Labial Coordination: Reduced  Lingual Coordination: Reduced    Auditory Comprehension  Comprehension: Exceptions  Yes/No Questions: Mild  Complex Questions: Moderate  One Step Basic Commands: Moderate  Common Objects: Moderate  Conversation: Moderate  Interfering Components: Hearing  Effective Techniques: Slowed speech;Repetition; Increased volume; Extra processing time    Expression  Primary Mode of Expression: Verbal    Verbal Expression  Verbal Expression: Exceptions to functional limits  Initiation: To be assessed in therapy  Repetition: Moderate  Automatic Speech: Moderate(inconsistent)  Confrontation: Moderate  Convergent: To be assessed in therapy  Divergent: To be assessed in therapy  Responsive: To be assessed in therapy  Conversation: To be assessed in therapy  Effective Techniques: Decreased rate;Provide extra time    Written Expression  Written Expression: Unable to assess    Motor Speech  Motor Speech: Exceptions to [facility]  Intelligibility: Moderate  Apraxia: Moderate  Dysarthria : Moderate    Cognition:      Orientation  Overall Orientation Status: Impaired  Orientation Level: Disoriented to situation  Attention  Attention: Exceptions to [facility]  Sustained Attention: Mild  Memory  Memory: Unable to assess  Problem Solving  Problem Solving: Unable to assess  Numeric Reasoning  Numeric Reasoning: Unable to assess  Abstract Reasoning  Abstract Reasoning: Unable to assess  Safety/Judgement  Safety/Judgement: Unable to assess      Prognosis:  Speech Therapy Prognosis  Prognosis: Fair  Prognosis Considerations: Age;Previous Level of Function  Additional Comments: Pt with previous hx of dysarthria/apraxia/language deficits.   Individuals consulted  Consulted and agree with results and recommendations:

## 2019-05-18 NOTE — PROGRESS NOTES
Pt neurologically stable overnight. VSS. Blood pressure much better after receiving PRN dose of Labetalol. C/O headache overnight. PRN Tylenol given at that time. Pt currently resting in bed comfortably. Bed alarm on. Call light within reach. Will continue to monitor.

## 2019-05-19 PROCEDURE — 6360000002 HC RX W HCPCS: Performed by: NURSE PRACTITIONER

## 2019-05-19 PROCEDURE — 2580000003 HC RX 258: Performed by: NURSE PRACTITIONER

## 2019-05-19 PROCEDURE — 6370000000 HC RX 637 (ALT 250 FOR IP): Performed by: INTERNAL MEDICINE

## 2019-05-19 PROCEDURE — 1200000000 HC SEMI PRIVATE

## 2019-05-19 RX ADMIN — LEVETIRACETAM 500 MG: 100 INJECTION, SOLUTION INTRAVENOUS at 09:41

## 2019-05-19 RX ADMIN — Medication 325 MG: at 16:27

## 2019-05-19 ASSESSMENT — PAIN SCALES - GENERAL
PAINLEVEL_OUTOF10: 0

## 2019-05-19 NOTE — PROGRESS NOTES
Patient assisted to bathroom using fausto steady and x2 assist, vitals stable, denies pain, assisted to position of comfort, patient encouraged to call with needs, call light in reach, items within reach, will continue to monitor

## 2019-05-19 NOTE — PROGRESS NOTES
lesions. Neurologic: Dysarthric. Cranial nerves: grossly intact  Psychiatric: Oriented only to person. Speech difficult to understand d/t baseline dysarthria. Labs:   Recent Labs     05/17/19  1030 05/18/19  0956   WBC 5.1 5.2   HGB 13.4* 12.3*   HCT 39.3* 37.2*    209     Recent Labs     05/17/19  1030 05/18/19  0956    140   K 4.9 4.1    105   CO2 28 23   BUN 19 19   CREATININE 1.2 0.8   CALCIUM 9.7 9.3     Recent Labs     05/17/19  1030   AST 16   ALT 13   BILITOT 0.4   ALKPHOS 57     Recent Labs     05/17/19  1030   INR 1.15*     Recent Labs     05/17/19  1030 05/17/19  2039   CKTOTAL  --  118   TROPONINI <0.01  --        Urinalysis:   Lab Results   Component Value Date    NITRU Negative 05/17/2019    WBCUA 0-2 07/26/2015    BACTERIA 1+ 07/26/2015    RBCUA None seen 07/26/2015    BLOODU Negative 05/17/2019    SPECGRAV 1.010 05/17/2019    GLUCOSEU Negative 05/17/2019       Radiology:  XR CHEST STANDARD (2 VW)   Final Result      Bilateral interstitial prominence may represent chronic fibrosis. No acute pulmonary consolidation. CT HEAD WO CONTRAST   Final Result      1. Large left cerebral convexity subdural hematoma is slightly more posteriorly compared to prior but overall unchanged in size with unchanged 5 mm rightward midline shift. Assessment/Plan:  Yulia Swenson, 80 y.o. male presents with fall, admitted for [facility]    Active Hospital Problems    Diagnosis Date Noted    Intracranial hemorrhage ([facility] [address] [I62.9] 05/17/2019     Subdural hematoma - re-peat CT head stable  - keppra 500mg BID IV  - Crestor 20mg po qd  - SBP <160 PRN, 5mg IV labetalol  - NSGY consulted, no intervention, hold DVT chemoprophylaxis, Keppra for 7d  - Palliative care consulted  - SLP ordered - general diet with thins ok  - Speech therapy  - PT/OT/CM ordered, from SNF    Starvation ketosis - 15 ketones in urine.  Patient normal BMI  - NPO now d/t concerns of aspiration from prior CVA and new SDH. SLP to evaluate    Hypothyroidism  - continue home Levothyroxine 100mcg daily     Iron deficiency anemia  - Continue ferrous sulfate 325 qd    BPH  - Flomax     DVT Prophylaxis: SCDs  Diet: DIET GENERAL;  Code Status: Full Code  Dispo - Wards, PT/OT pending     PT/OT Eval Status: none    I will discuss the patient with the senior resident and MD Gisele Fletcher, [facility]  Internal Medicine Resident PGY-2  Reach via [address] [place] [address]

## 2019-05-20 VITALS
WEIGHT: 168 LBS | TEMPERATURE: 98 F | HEART RATE: 70 BPM | BODY MASS INDEX: 22.75 KG/M2 | SYSTOLIC BLOOD PRESSURE: 184 MMHG | DIASTOLIC BLOOD PRESSURE: 76 MMHG | OXYGEN SATURATION: 94 % | HEIGHT: 72 IN | RESPIRATION RATE: 14 BRPM

## 2019-05-20 PROCEDURE — 97530 THERAPEUTIC ACTIVITIES: CPT

## 2019-05-20 PROCEDURE — 97167 OT EVAL HIGH COMPLEX 60 MIN: CPT

## 2019-05-20 PROCEDURE — 6360000002 HC RX W HCPCS: Performed by: NURSE PRACTITIONER

## 2019-05-20 PROCEDURE — 97161 PT EVAL LOW COMPLEX 20 MIN: CPT

## 2019-05-20 PROCEDURE — 6370000000 HC RX 637 (ALT 250 FOR IP): Performed by: INTERNAL MEDICINE

## 2019-05-20 PROCEDURE — 6370000000 HC RX 637 (ALT 250 FOR IP)

## 2019-05-20 PROCEDURE — 2580000003 HC RX 258: Performed by: INTERNAL MEDICINE

## 2019-05-20 PROCEDURE — 2580000003 HC RX 258: Performed by: NURSE PRACTITIONER

## 2019-05-20 RX ORDER — ASPIRIN 81 MG/1
81 TABLET ORAL DAILY
Qty: 30 TABLET | Refills: 0 | Status: SHIPPED | OUTPATIENT
Start: 2019-05-31

## 2019-05-20 RX ORDER — DEXAMETHASONE 4 MG/1
4 TABLET ORAL EVERY 8 HOURS SCHEDULED
Status: DISCONTINUED | OUTPATIENT
Start: 2019-05-21 | End: 2019-05-20 | Stop reason: HOSPADM

## 2019-05-20 RX ORDER — LEVETIRACETAM 500 MG/1
500 TABLET ORAL 2 TIMES DAILY
Status: DISCONTINUED | OUTPATIENT
Start: 2019-05-20 | End: 2019-05-20 | Stop reason: HOSPADM

## 2019-05-20 RX ORDER — DEXAMETHASONE 4 MG/1
4 TABLET ORAL EVERY 6 HOURS
Qty: 3 TABLET | Refills: 0 | Status: SHIPPED | OUTPATIENT
Start: 2019-05-20 | End: 2019-05-21

## 2019-05-20 RX ORDER — LEVETIRACETAM 500 MG/1
500 TABLET ORAL 2 TIMES DAILY
Qty: 6 TABLET | Refills: 0 | Status: SHIPPED | OUTPATIENT
Start: 2019-05-20 | End: 2020-02-22 | Stop reason: ALTCHOICE

## 2019-05-20 RX ORDER — DEXAMETHASONE 4 MG/1
4 TABLET ORAL DAILY
Qty: 1 TABLET | Refills: 0 | Status: SHIPPED | OUTPATIENT
Start: 2019-05-24 | End: 2019-05-25

## 2019-05-20 RX ORDER — DEXAMETHASONE 4 MG/1
4 TABLET ORAL EVERY 6 HOURS SCHEDULED
Status: DISCONTINUED | OUTPATIENT
Start: 2019-05-20 | End: 2019-05-20 | Stop reason: HOSPADM

## 2019-05-20 RX ORDER — DEXAMETHASONE 4 MG/1
4 TABLET ORAL EVERY 12 HOURS SCHEDULED
Qty: 2 TABLET | Refills: 0 | Status: SHIPPED | OUTPATIENT
Start: 2019-05-22 | End: 2019-05-23

## 2019-05-20 RX ORDER — DEXAMETHASONE 4 MG/1
4 TABLET ORAL EVERY 8 HOURS SCHEDULED
Qty: 3 TABLET | Refills: 0 | Status: SHIPPED | OUTPATIENT
Start: 2019-05-21 | End: 2019-05-22

## 2019-05-20 RX ORDER — DEXAMETHASONE 4 MG/1
4 TABLET ORAL DAILY
Status: DISCONTINUED | OUTPATIENT
Start: 2019-05-24 | End: 2019-05-20 | Stop reason: HOSPADM

## 2019-05-20 RX ORDER — DEXAMETHASONE 4 MG/1
4 TABLET ORAL EVERY 12 HOURS SCHEDULED
Status: DISCONTINUED | OUTPATIENT
Start: 2019-05-22 | End: 2019-05-20 | Stop reason: HOSPADM

## 2019-05-20 RX ORDER — AMLODIPINE BESYLATE 2.5 MG/1
2.5 TABLET ORAL DAILY
Qty: 30 TABLET | Refills: 3 | Status: SHIPPED | OUTPATIENT
Start: 2019-05-20

## 2019-05-20 RX ORDER — AMLODIPINE BESYLATE 2.5 MG/1
2.5 TABLET ORAL DAILY
Status: DISCONTINUED | OUTPATIENT
Start: 2019-05-20 | End: 2019-05-20 | Stop reason: HOSPADM

## 2019-05-20 RX ADMIN — Medication 1 CAPSULE: at 08:05

## 2019-05-20 RX ADMIN — Medication 325 MG: at 08:05

## 2019-05-20 RX ADMIN — ROSUVASTATIN CALCIUM 20 MG: 20 TABLET, COATED ORAL at 08:04

## 2019-05-20 RX ADMIN — LEVETIRACETAM 500 MG: 100 INJECTION, SOLUTION INTRAVENOUS at 08:13

## 2019-05-20 RX ADMIN — AMLODIPINE BESYLATE 2.5 MG: 2.5 TABLET ORAL at 13:08

## 2019-05-20 RX ADMIN — LEVOTHYROXINE SODIUM 100 MCG: 100 TABLET ORAL at 08:05

## 2019-05-20 RX ADMIN — DEXAMETHASONE 4 MG: 4 TABLET ORAL at 12:19

## 2019-05-20 RX ADMIN — Medication 10 ML: at 08:12

## 2019-05-20 ASSESSMENT — PAIN SCALES - GENERAL
PAINLEVEL_OUTOF10: 0
PAINLEVEL_OUTOF10: 0

## 2019-05-20 NOTE — PROGRESS NOTES
NEUROSURGERY PROGRESS NOTE    5/20/2019 9:10 AM                               Yanet Ramires                      LOS: 3 days               Subjective:  No acute events overnight. Patient has no specific complaints this am.         Physical Exam:  Patient seen and examined    Vitals:    05/20/19 0745   BP: (!) 162/83   Pulse: 75   Resp: 16   Temp: 98.3 °F (36.8 °C)   SpO2: 92%     GCS:  4 - Opens eyes on own  5 - Alert and oriented  6 - Follows simple motor commands  General: Well developed. Alert and cooperative in no acute distress.     HENT: atraumatic, neck supple  Eyes: Optic discs: Not tested  Pulmonary: unlabored respiratory effort  Cardiovascular:  Warm well perfused. No peripheral edema  Gastrointestinal: abdomen soft, NT, ND     Neurological:  Mental Status: Awake, alert, oriented to self, \"hospital\" and aware of \"passing out\", but does not know year; hard to understand at times 2/2 dysarthria  Attention: Intact  Language: Dysarthria noted  Sensation: Intact to all extremities to light touch  Coordination: Intact     Cranial Nerves:  Cranial Nerves:  II: Visual acuity not tested, denies new visual changes / diplopia  III, IV, VI: PERRL, 3 mm bilaterally, EOMI, no nystagmus noted  V: Facial sensation intact bilaterally to touch  VII: Face symmetric  VIII: Hearing intact bilaterally to spoken voice  IX: Palate movement equal bilaterally  XI: Shoulder shrug equal bilaterally  XII: Tongue midline     Musculoskeletal:   Gait: Not tested   Assist devices: None   Tone: Right side flaccid; Left side normal  Motor strength:     Right  Left      Right  Left    Deltoid  0 5   Hip Flex  3- 5   Biceps  0 5   Knee Extensors  3- 5   Triceps  0 5   Knee Flexors  3- 5   Wrist Ext  0 5   Ankle Dorsiflex. 3- 5   Wrist Flex  0 5   Ankle Plantarflex.   3- 5   Handgrip  0 5   Ext Domo Longus  3- 5   Thumb Ext  0 5                Radiological Findings:  Ct Head Wo Contrast  Result Date: 5/17/2019  Large isodense subdural hematoma along the left superolateral convexity indicating subacute time frame. Small amount of hyperdensity within the collection inferiorly and anteriorly suspicious for acute component. Mild midline shift to the right by 5 mm. Small amount of hyperdensity along the anterior interhemispheric fissure consistent with acute subdural hemorrhage. Chronic microvascular ischemic changes.     Cta Head W Contrast & Cta Neck W Contrast  Result Date: 5/17/2019  1. No flow limiting stenosis of the cervical ICAs by NASCET criteria. 2. Evidence of prior left carotid endarterectomy. 3. Moderate stenosis at the origin of the right vertebral artery. 4. Moderate focal stenosis involving a proximal left M2 branch. 5. Large left subdural hematoma similar to the earlier CT. Ct Head Wo Contrast  Result Date: 5/17/2019  Large left cerebral convexity subdural hematoma is slightly more posteriorly compared to prior but overall unchanged in size with unchanged 5 mm rightward midline shift.      Labs:  Recent Labs     05/18/19  0956   WBC 5.2   HGB 12.3*   HCT 37.2*          Recent Labs     05/17/19  1030 05/18/19  0956    140   K 4.9 4.1    105   CO2 28 23   BUN 19 19   CREATININE 1.2 0.8   GLUCOSE 116* 99   CALCIUM 9.7 9.3   MG 2.20  --        Recent Labs     05/17/19  1030   PROTIME 13.1*   INR 1.15*       Patient Active Problem List    Diagnosis Date Noted    Intracranial hemorrhage ([facility] [address] 05/17/2019    Chronic dementia, with behavioral disturbance 12/22/2018    Vomiting 12/19/2018    Centrilobular emphysema (HCC)     Acute ischemic stroke (HCC)     Chronic cor pulmonale (HCC)     Slurred speech     Arterial ischemic stroke, ICA, right, acute ([facility] [address]     Remote history of stroke     HTN (hypertension), benign     CAD in native artery     Atelectasis 09/17/2017    Elevated blood pressure reading 09/17/2017    Hypothyroidism due to acquired atrophy of thyroid 09/17/2017    Debility 09/17/2017    Dyslipidemia 09/17/2017    Cerebrovascular accident (CVA) ([facility] [address]     Aphagia 06/20/2017    Abdominal aortic aneurysm (AAA) without rupture ([address] 06/20/2017    Leukocytosis 06/20/2017    Physical deconditioning 06/19/2017    Frequent falls 06/19/2017    Left-sided weakness 06/19/2017    Hypothyroidism     Cerebral artery occlusion with cerebral infarction ([address]     Otitis externa 07/28/2015    GI bleeding 07/27/2015    Right upper quadrant pain 01/02/2015    Intraparenchymal hemorrhage of brain ([address] 02/06/2014   [facility] psychosis ([address] 12/03/2013    Seizure, petit mal ([address] 11/28/2013    Cerebral infarction ([facility] [address] 11/22/2013    Coronary artery disease involving native coronary artery of native heart without angina pectoris 11/22/2013    Ataxia 11/22/2013       Assessment:  Patient is a 80 y.o. male w//large stable chronic left side SDH. Plan:  1. Neurologically stable  2. Neurologic exams frequency:  - Floor: Q4H  3. For change in exam MUST contact neurosurgery team along with critical care or primary team  4. SDH:  - Follow up head CT stable. No further imaging unless there is a decline in neurologic  - Maintain SBP <160; If PRN med insufficient, then may start Nicardipine infusion  - Keep Plt >100k & INR <1.4  - Hold all NSAID's, anticoagulation & antiplatelet for 2 weeks  - Decadron 4 mg taper  5. Seizure prophylaxis: Keppra 500mg BID x7 days  6. DVT Prophylaxis: SCD's  7. GI Prophylaxis: Pepcid  8. Mobility: PT/OT as tolerates  9. Diet: Advance as tolerates  10. Follow up w/Dr. Heather Helton in 2 weeks w/CT Head  11. Will sign off. Please call with any questions or decline in neurological status    DISPO: OK to DC once placement finalized from neurosurgery standpoint. Dispo timing to be determined by primary team once patient is medically stable for discharge. Patient was discussed with Dr. Heather Helton who agrees with above assessment and plan.      Electronically signed by: Francisco Lopez

## 2019-05-20 NOTE — PROGRESS NOTES
Patient discharged to [facility] and transported via [employer]. Discharge instructions and all personal belongings given to transport company. IV removed. Report called and given to [address] at [address]

## 2019-05-20 NOTE — PROGRESS NOTES
Physical Therapy    Facility/Department: [facility]  Initial Assessment / treatment    NAME: Yanet Ramires  : 12/15/1931  MRN: [id number]    Date of Service: 2019    Discharge Recommendations: Yanet Ramires scored a 10/24 on the AM-PAC short mobility form. Current research shows that an AM-PAC score of 17 or less is typically not associated with a discharge to the patient's home setting. Based on the patients AM-PAC score and their current functional mobility deficits, it is recommended that the patient have 3-5 sessions per week of Physical Therapy at d/c to increase the patients independence. PT Equipment Recommendations  Equipment Needed: No  Other: defer to next level of care    Assessment   Body structures, Functions, Activity limitations: Decreased functional mobility   Assessment: Pt is below his functional baseline. Requires assist x 2 for safe transfers. Pt is typically able to transfer independently or with 1 person assist.  Rec continued IP PT. Pt agreeable. Treatment Diagnosis: impaired functional mobility  Prognosis: Good  Decision Making: Low Complexity  Patient Education: role of PT, use of call light, d/c planning; pt demonstrated good understanding  REQUIRES PT FOLLOW UP: Yes       Patient Diagnosis(es): There were no encounter diagnoses. has a past medical history of Coronary artery disease, CVA (cerebral infarction), Hypercholesteremia, Seizure, petit mal ([address] Thyroid disease, and Unspecified cerebral artery occlusion with cerebral infarction. has a past surgical history that includes Appendectomy; fracture surgery; Carotid endarterectomy (Left, 2013); Cardiac surgery; Coronary artery bypass graft (); and Cholecystectomy, laparoscopic (1-22-15).     Restrictions  Position Activity Restriction  Other position/activity restrictions: up with assist  Vision/Hearing  Vision: Within Functional Limits  Hearing: Exceptions to [facility]  Hearing Exceptions: Hard of hearing/hearing concerns; Left hearing aid     Subjective  General  Chart Reviewed: Yes  Additional Pertinent Hx: Admit 5/17 from outside hospital; head CT: large SDH; PMHx: CAD, L frontal CVA, seizure, R hemiplegia, cardiac stents, CABG, fx surgery  Referring Practitioner: DO Negrito  Diagnosis: ICH  Subjective  Subjective: Pt found supine in bed. Pleasant and agreeable to PT. Able to indicate yes/no to questions but limited speech otherwise. Pain Screening  Patient Currently in Pain: Denies       Orientation  Orientation  Overall Orientation Status: Impaired(difficult to assess due to aphasia; pt able to state first and last name, able to indicate \"hospital\" when given choices)  Social/Functional History  Social/Functional History  Type of Home: Facility([facility] - long term care resident)  ADL Assistance: Needs assistance(min A with dressing, max A with bathing, indep with toileting)  Ambulation Assistance: (non-ambulatory; able to propel self in w/c to dining crotez several times per day)  Transfer Assistance: Needs assistance(assist x 1 for bed mobility and to w/c; indep with toilet transfers)  Additional Comments: pt from [facility]. Pt indicates he uses w/c and has assist with transfers, states he was active with PT and OT. Staff at [place] provided additional info as stated above. Pt indep with feeding.   Cognition        Objective          PROM RLE (degrees)  RLE PROM: WFL  AROM LLE (degrees)  LLE AROM : WFL  Strength RLE  Comment: unable to demo active ankle DF but min movement noted during functional mobility; 3-/5 knee ext; 1/5 hip flex  Strength LLE  Comment: at least 3+/5 throughout        Bed mobility  Supine to Sit: Minimal assistance(increased time and effort)  Scooting: Minimal assistance  Transfers  Sit to Stand: Dependent/Total(min A x 2 with R LE blocked 2* extensor tone; from EOB and chair)  Stand to sit: Dependent/Total(min A x 2)  Bed to Chair: Dependent/Total(min + mod A via stand pivot going to the L)        Balance  Sitting - Static: Fair  Sitting - Dynamic: Fair(increased posterior lean at times during LE MMT)  Standing - Static: Fair(min A x 1; stood x 2 min with intermittent L UE HHA; R knee hyperextension)  Comments: attempted to take steps in stance - pt unable to demo purposeful movement of LEs      Treatment included bed mobility, balance, and transfer training, pt education. Plan   Plan  Times per week: 5-7  Current Treatment Recommendations: Strengthening, Patient/Caregiver Education & Training, Balance Training, Functional Mobility Training, Transfer Training, Positioning, Home Exercise Program  Safety Devices  Type of devices: Left in chair, Call light within reach, Chair alarm in place, Nurse notified, Gait belt    G-Code       OutComes Score                                                  AM-PAC Score  AM-PAC Inpatient Mobility Raw Score : 10  AM-PAC Inpatient T-Scale Score : 32.29  Mobility Inpatient CMS 0-100% Score: 76.75  Mobility Inpatient CMS G-Code Modifier : CL          Goals  Short term goals  Time Frame for Short term goals: discharge  Short term goal 1: Pt will transfer supine <--> sit with SBA  Short term goal 2: Pt will transfer sit <--> stand with min A x 1  Short term goal 3: Pt will transfer bed <--> chair with min A x 1  Patient Goals   Patient goals : resume therapy services at [facility]       Therapy Time   Individual Concurrent Group Co-treatment   Time In 0830         Time Out 0915         Minutes 45                 Timed Code Treatment Minutes:  30    Total Treatment Minutes:  45    If patient is discharged prior to next treatment, this note will serve as the discharge summary.   Mason Peña, PT, DPT  [id number]

## 2019-05-20 NOTE — CARE COORDINATION
Case Management Discharge Assessment    2019  [facility])  Clinical Case Management Department    Called pt's wife to inform her of discharge and transport time. Luisa Blanco RN is aware of discharge time. [facility] to [facility]. Patient: Lena Lawson  MRN: [id number] / : 12/15/1931  ACCT: [de-identified]          Admission Documentation  Attending Provider: Otoniel Montgomery MD  Admit date/time: 2019  4:25 PM  Status: Inpatient [101]  Diagnosis: Intracranial hemorrhage ([facility] [address]     Readmission within last 30 days:  no     Living Situation       Service Assessment:       Values / Beliefs  Do you have any ethnic, cultural, sacramental, or spiritual Religion needs you would like us to be aware of while you are in the hospital?: No    Advance Directives (For Healthcare)  Healthcare Directive: No, patient does not have an advance directive for healthcare treatment                        [address] Medications:     Does patient want to participate in local refill/meds to beds program?:      Notification completed in HENS/PAS? No pt is long term    IMM  No copy sent with patient and wife aware but pt unable to sign      Discharge disposition: SNF:[facility] Phone: [phone] and ask for the [address] [place] [address] nurse Fax: [phone]     LOC long term care  Name of [address] [place] [address]  Phone of Facility [phone]  Fax of Facility [phone]    Mode of Transport medical transport  Name of [facility]  Number of Transport [phone]  Time of Transport [address] and his family were provided with choice of provider; he and his family are in agreement with the discharge plan.       Care Transitions patient: No    Comfort Caldwell RN  [facility]  Case Management Department  Ph: [phone] Fax: [phone]

## 2019-05-20 NOTE — DISCHARGE INSTR - COC
Continuity of Care Form    Patient Name: Justine Tate   :  12/15/1931  MRN:  [id number]    Admit date:  2019  Discharge date:  2019    Code Status Order: Full Code   Advance Directives:   Advance Care Flowsheet Documentation     Date/Time Healthcare Directive Type of Healthcare Directive Copy in [address] Agent's Name Healthcare Agent's Phone Number    19 1750  No, patient does not have an advance directive for healthcare treatment -- -- -- -- --          Admitting Physician:  Bruce Rivero MD  PCP: Debbie Greer MD    [facility] Unit/Room#: 9074/2838-16  Discharging Unit Phone Number: [phone]    Emergency Contact:   Extended Emergency Contact Information  Primary Emergency Contact: Nicolette Baldwin  Address: [address] [facility], [address] [place] [address],[address] Phone: [phone]  Mobile Phone: [phone]  Relation: Spouse  Secondary Emergency Contact: Tanner Terry [address] Phone: [phone]  Mobile Phone: [phone]  Relation: Child    Past Surgical History:  Past Surgical History:   Procedure Laterality Date    APPENDECTOMY      CARDIAC SURGERY      stents     CAROTID ENDARTERECTOMY Left 2013    CHOLECYSTECTOMY, LAPAROSCOPIC  1-22-15    CORONARY ARTERY BYPASS GRAFT  2009    FRACTURE SURGERY         Immunization History:   Immunization History   Administered Date(s) Administered    Influenza, MDCK Quadv, IM, PF (Flucelvax 4 yrs and older) 2017    Pneumococcal 13-valent Conjugate Yvan Panola) 2017       Active Problems:  Patient Active Problem List   Diagnosis Code    Cerebral infarction ([facility] [address] I63.9    Coronary artery disease involving native coronary artery of native heart without angina pectoris I25.10    Ataxia R27.0    Seizure, petit mal ([facility] [address] G40. [address]) F23    Intraparenchymal hemorrhage of brain (HCC) I61.9    Right upper quadrant pain R10.11    GI bleeding K92.2    Otitis externa H60.90    Physical deconditioning R53.81    Frequent falls R29.6    Hypothyroidism E03.9    Cerebral artery occlusion with cerebral infarction (HCC) I63.50    Left-sided weakness R53.1    Aphagia R13.0    Abdominal aortic aneurysm (AAA) without rupture (HCC) I71.4    Leukocytosis D72.829    Atelectasis J98.11    Elevated blood pressure reading R03.0    Hypothyroidism due to acquired atrophy of thyroid E03.4    Debility R53.81    Dyslipidemia E78.5    Cerebrovascular accident (CVA) ([address] I63.9    Slurred speech R47.81    Arterial ischemic stroke, ICA, right, acute (HCC) I63.231    Remote history of stroke Z86.73    HTN (hypertension), benign I10    CAD in native artery I25.10    Acute ischemic stroke (HCC) I63.9    Chronic cor pulmonale (HCC) I27.81    Centrilobular emphysema (HCC) J43.2    Vomiting R11.10    Chronic dementia, with behavioral disturbance F03.91    Intracranial hemorrhage (HCC) I62.9       Isolation/Infection:   Isolation          No Isolation            Nurse Assessment:  Last Vital Signs: BP (!) 162/83   Pulse 75   Temp 98.3 °F (36.8 °C) (Oral)   Resp 16   Ht 6' (1.829 m)   Wt 168 lb (76.2 kg)   SpO2 92%   BMI 22.78 kg/m²     Last documented pain score (0-10 scale): Pain Level: 0  Last Weight:   Wt Readings from Last 1 Encounters:   05/17/19 168 lb (76.2 kg)     Mental Status:  oriented and alert  X self  IV Access:  - None    Nursing Mobility/ADLs:  Walking   Dependent  Transfer  Dependent  Bathing  Assisted  Dressing  Assisted  Toileting  Assisted  Feeding  Assisted  Med Admin  Assisted  Med Delivery   whole    Wound Care Documentation and Therapy:        Elimination:  Continence:   · Bowel: No  · Bladder: No  Urinary Catheter: condom cath   Colostomy/Ileostomy/Ileal Conduit: No       Date of Last BM: 05/18/2019    Intake/Output Summary (Last 24 hours) at information and transfer of Jorge Leblanc  is necessary for the continuing treatment of the diagnosis listed and that he requires [place] for greater 30 days.      Update Admission H&P: No change in H&P    PHYSICIAN SIGNATURE:  Electronically signed by Nigel Patel MD on 5/20/19 at 10:51 AM

## 2019-05-20 NOTE — DISCHARGE SUMMARY
INTERNAL MEDICINE DEPARTMENT AT [address] [place] [address]  DISCHARGE SUMMARY    Patient ID: Nathalie Benavides                                             Discharge Date: 5/20/2019   Patient's PCP: Micheline Patel MD                                          Discharge Physician: Jelena Callahan MD  Admit Date: 5/17/2019   Admitting Physician: Radha Morocho MD    DISCHARGE DIAGNOSES:  1- Left subdural hematoma, POA, stable  2- Starvation ketosis, resolved    Chronic, Stable Medical Conditions, POA:  1. Hypothyroidism  2. Iron deficiency anemia  3. BPH    Hospital Course:  Patient presented to outside hospital after several falls at a nursing home. Found to have a large L SDH with a 5 mm midline shift. Transferred to [facility] for neurosurgical evaluation. Repeat CT head stable. No surgical intervention. Patient remained neurologically stable without new deficits. Consistently A/O x1 or 2. Pt's BP crept up in light of the glucocorticoids and was started on 2.5mg Norvasc on discharge. On the date of discharge, the patient reported feeling stable. The patient was found to not be in any acute distress, with vital signs within normal limits, and no new abnormalities on physical examination. Further, the patient expressed appropriate understanding of, and agreement with, the discharge recommendations, medications, and plan. Physical Exam:  BP (!) 184/76   Pulse 70   Temp 98 °F (36.7 °C) (Oral)   Resp 14   Ht 6' (1.829 m)   Wt 168 lb (76.2 kg)   SpO2 94%   BMI 22.78 kg/m²   General appearance: No acute distress  HEENT:  Normal cephalic,atraumatic without obvious deformity. Pupils equal, round, and reactive to light. Slow left lateral gaze, other ocular muscles intact,  Conjunctivae/corneas clear. Neck: Supple, with full range of motion. Respiratory:  Normal respiratory effort. Clear to auscultation, bilaterally without Rales/Wheezes/Rhonchi.   Cardiovascular:  Regular rate and rhythm with normal S1/S2 without medications    acetaminophen 325 MG tablet  Commonly known as:  TYLENOL  Take 2 tablets by mouth every 4 hours as needed for Pain or Fever     b complex vitamins capsule     citalopram 10 MG tablet  Commonly known as:  CELEXA     ferrous sulfate 325 (65 Fe) MG tablet  Take 1 tablet by mouth 2 times daily (with meals)     ibuprofen 200 MG tablet  Commonly known as:  ADVIL;MOTRIN     levothyroxine 100 MCG tablet  Commonly known as:  SYNTHROID  Take 1 tablet by mouth every morning (before breakfast)     magnesium hydroxide 400 MG/5ML suspension  Commonly known as:  MILK OF MAGNESIA     melatonin 3 MG Tabs tablet     MYRBETRIQ 50 MG Tb24  Generic drug:  Mirabegron ER     polyethylene glycol packet  Commonly known as:  GLYCOLAX     rosuvastatin 20 MG tablet  Commonly known as:  CRESTOR     tamsulosin 0.4 MG capsule  Commonly known as:  FLOMAX  Take 2 capsules by mouth nightly           Where to Get Your Medications      These medications were sent to [facility] [address] [facility], [address], [address] [address]    Phone:  [phone]   · aspirin EC 81 MG EC tablet     You can get these medications from any pharmacy    Bring a paper prescription for each of these medications  · amLODIPine 2.5 MG tablet  · dexamethasone 4 MG tablet  · dexamethasone 4 MG tablet  · dexamethasone 4 MG tablet  · dexamethasone 4 MG tablet  · levETIRAcetam 500 MG tablet       Activity: activity as tolerated  Diet: regular diet  Wound Care: none needed    Time Spent on discharge is more than 30 minutes    Signed:  Wagner Mcintosh MD   Internal Medicine, PGY1  5/20/2019           Patient seen and examined, plan of care discussed with residents. Agree with their assessment and plan with following addendum:  Briefly, patient was admitted with falls t home and found to have subdural hematoma. He was seen by neurosurgery and advised against evacuation. Hold aspirin for 2 weeks.

## 2019-05-20 NOTE — CARE COORDINATION
2019  [facility])  Clinical Case Management Department    Pt is from [facility] and is able to return there upon discharge. Patient: Oswaldo Bowers  MRN: [id number] / : 12/15/1931  ACCT: [de-identified]          Admission Documentation  Attending Provider: Denys Burgos MD  Admit date/time: 2019  4:25 PM  Status: Inpatient [101]  Diagnosis: Intracranial hemorrhage ([address]     Readmission within last 30 days:  no     Living Situation       Service Assessment       Values / Beliefs  Do you have any ethnic, cultural, sacramental, or spiritual Restorationism needs you would like us to be aware of while you are in the hospital?: No    Advance Directives (For Healthcare)  Healthcare Directive: No, patient does not have an advance directive for healthcare treatment                        [facility]    [facility]    Home Health/Skilled [address] at Discharge: SNF Nursing daily  Home care at home?  No Provider: [facility] Provider Phone: NA     Therapy Consults  PT evaluation needed?: Yes (Comment)  OT Evalulation Needed?: Yes (Comment)  SLP evaluation needed?: Yes (Comment)    Home Medical Care  NA  Pharmacy: [facility] Medications:     Does patient want to participate in local refill/meds to beds program?:      Goals of Care     Patient plans for SNF: pt is from [facility]         Mode of transport from hospital: will need medical transport    Factors facilitating achievement of predicted outcomes: Family support and Cooperative    Barriers to discharge: none noted     Sarah Mckeon RN  [facility]  Case Management Department  Ph: [phone]: [phone]

## 2019-05-20 NOTE — PROGRESS NOTES
Occupational Therapy   Occupational Therapy Initial Assessment and Treatment  Late Entry for 905  Possible Discharge  Date: 2019   Patient Name: Joyce Chavira  MRN: [id number]     : 12/15/1931    Date of Service: 2019    Discharge Recommendations:  Joyce Chavira scored a  on the AM-PAC ADL Inpatient form. Current research shows that an AM-PAC score of 17 or less is typically not associated with a discharge to the patient's home setting. Based on the patients AM-PAC score and their current ADL deficits, it is recommended that the patient have 3-5 sessions per week of Occupational Therapy at d/c to increase the patients independence. OT Equipment Recommendations  Equipment Needed: No  Other: defer recommendations to discharge facility    Assessment   Performance deficits / Impairments: Decreased functional mobility ; Decreased ADL status; Decreased endurance;Decreased balance;Decreased strength;Decreased ROM  Assessment: Pt poor historian and unable to provide information about PLOF. After contacting facility, pt is a LT resident, but was able to transfer to toilet independently from w/c. Pt is functioning below his baseline and would benefit from continued OT at discharge (indicating he was working with OT). Continue per POC. Treatment Diagnosis: impaired ADLs/transfers  Prognosis: Fair  Decision Making: High Complexity  Patient Education: role of OT -indicates understanding, ? full comprehension   REQUIRES OT FOLLOW UP: Yes  Activity Tolerance  Activity Tolerance: Patient Tolerated treatment well  Safety Devices  Safety Devices in place: Yes  Type of devices: Nurse notified; Left in chair;Chair alarm in place;Call light within reach           Patient Diagnosis(es): There were no encounter diagnoses.      has a past medical history of Coronary artery disease, CVA (cerebral infarction), Hypercholesteremia, Seizure, petit mal ([facility] [address] Thyroid disease, and Unspecified cerebral artery occlusion with cerebral infarction. has a past surgical history that includes Appendectomy; fracture surgery; Carotid endarterectomy (Left, 11-); Cardiac surgery; Coronary artery bypass graft (2009); and Cholecystectomy, laparoscopic (1-22-15). Treatment Diagnosis: impaired ADLs/transfers      Restrictions  Position Activity Restriction  Other position/activity restrictions: up with assist    Subjective   General  Chart Reviewed: Yes  Patient assessed for rehabilitation services?: Yes  Additional Pertinent Hx: 80 y.o. M to ED 5/17 after he became profoundly weak and unresponsive when at Physical Therapy. At that time his blood pressure was 71/43. Hospital course:  CT Head: (+) Large isodense subdural hematoma along the left superolateral convexity. 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 Family / Caregiver Present: No  Referring Practitioner: Dr. Barbara Elmore  Diagnosis: Intracranial Hemorrhage    Subjective  Subjective: Supine in bed on entry. Pleasant and cooperative. Expressive aphasia - does well w/ yes/no questions. Laughed when asked if he ever uses a walker - indicated always using a w/c. Pain Level: denies     Social/Functional History  Social/Functional History  Type of Home: Facility([facility] - long term care resident)  ADL Assistance: Needs assistance(min A with dressing, max A with bathing, indep with toileting)  Ambulation Assistance: (non-ambulatory; able to propel self in w/c to dining cortez several times per day)  Transfer Assistance: Needs assistance(assist x 1 for bed mobility and to w/c; indep with toilet transfers)  Additional Comments: pt from [facility]. Pt indicates he uses w/c and has assist with transfers, states he was active with PT and OT. Staff at [facility] provided additional info as stated above. Pt indep with feeding.        Objective   Vision: Within Functional Limits  Hearing: Exceptions to [facility]  Hearing Exceptions: Hard of hearing/hearing concerns; Left hearing aid      Orientation  Overall Orientation Status: (expressive aphasia)     Standing Balance/Tolerance for Standing Activity  Time: ~ 2 minutes  Activity: static standing at chair level  Comment: fatigued w/ activity; Min/Mod A - for balance, RLE did not buckle, but was mildly hyperextended w/ static standing; unable to take steps in place    ADL  LE Dressing: Dependent/Total     Tone RUE  RUE Tone: Hypertonic  Tone Description: old CVA - flexion synergy contracture  Tone LUE  LUE Tone: Normotonic  Coordination  Movements Are Fluid And Coordinated: No  Coordination and Movement description: Right UE     Bed mobility  Supine to Sit: Minimal assistance(increased time/effort)  Scooting: Minimal assistance(to EOB)     Transfers  Sit Pivot Transfers: Dependent/Total(Mod A + Min A transfer to L; RLE extended during transfer)  Sit to stand: 2 Person assistance(Min A of 2 (from bed in prep for pivot); Min A (from chair, R knee blocked))  Stand to sit: 2 Person assistance(Min A of  2)     Cognition  Overall Cognitive Status: Exceptions  Following Commands: Follows one step commands with repetition(some receptive aphasia; does well w/ demonstration)  Attention Span: Attends with cues to redirect  Initiation: Requires cues for some  Sequencing: Requires cues for some        Sensation  Overall Sensation Status: Impaired(reports lack of sensation RUE)        LUE AROM (degrees)  LUE AROM : WFL  Left Hand AROM (degrees)  Left Hand AROM: WFL  RUE AROM (degrees)  RUE General AROM: RUE flexion contracture from previous CVA (wrist fixed in flexed position, digits flexed - washcloth applied in palm of hand to keep nails from embedding in palm)                  Pt seen by OT for eval and treat.  Treatment included:  Bed mobility, functional transfer, unsupported sitting             Plan  This note will serve as a discharge summary in the event the patient is discharged prior to next treatment session.     Plan  Times per week: 5-7  Times per day: Daily  Current Treatment Recommendations: Strengthening, ROM, Balance Training, Safety Education & Training, Self-Care / ADL                                                      AM-PAC Score        AM-PAC Inpatient Daily Activity Raw Score: 11  AM-PAC Inpatient ADL T-Scale Score : 29.04  ADL Inpatient CMS 0-100% Score: 70.42  ADL Inpatient CMS G-Code Modifier : CL    Goals  Short term goals  Time Frame for Short term goals: Discharge  Short term goal 1: simulated BSC transfer w/ Mod A of 1, transfer to L  Short term goal 2: supine to sit w/ CGA  Patient Goals   Patient goals : no goal stated       Therapy Time   Individual Concurrent Group Co-treatment   Time In 0835         Time Out 0905         Minutes 30           Timed Code Treatment Minutes:   15    Total Treatment Minutes:  [address] OTR/L #[id number]

## 2019-06-03 ENCOUNTER — HOSPITAL ENCOUNTER (OUTPATIENT)
Dept: CT IMAGING | Age: 84
Discharge: HOME | End: 2019-06-03
Payer: MEDICARE

## 2019-06-03 DIAGNOSIS — I62.00 SUBDURAL HEMORRHAGE (HCC): ICD-10-CM

## 2019-06-03 PROCEDURE — 70450 CT HEAD/BRAIN W/O DYE: CPT

## 2019-06-12 ENCOUNTER — APPOINTMENT (OUTPATIENT)
Dept: GENERAL RADIOLOGY | Age: 84
DRG: 195 | End: 2019-06-12
Payer: MEDICARE

## 2019-06-12 ENCOUNTER — HOSPITAL ENCOUNTER (INPATIENT)
Age: 84
LOS: 4 days | Discharge: NURSING, REHAB OR LONG-TERM CARE | DRG: 195 | End: 2019-06-17
Attending: EMERGENCY MEDICINE | Admitting: INTERNAL MEDICINE
Payer: MEDICARE

## 2019-06-12 DIAGNOSIS — R41.82 ALTERED MENTAL STATUS, UNSPECIFIED ALTERED MENTAL STATUS TYPE: ICD-10-CM

## 2019-06-12 DIAGNOSIS — R50.9 ELEVATED TEMPERATURE: ICD-10-CM

## 2019-06-12 DIAGNOSIS — R09.02 HYPOXIA: ICD-10-CM

## 2019-06-12 DIAGNOSIS — J18.9 PNEUMONIA DUE TO ORGANISM: Primary | ICD-10-CM

## 2019-06-12 LAB
A/G RATIO: 1 (ref 1.1–2.2)
ALBUMIN SERPL-MCNC: 3.3 G/DL (ref 3.4–5)
ALP BLD-CCNC: 78 U/L (ref 40–129)
ALT SERPL-CCNC: 15 U/L (ref 10–40)
ANION GAP SERPL CALCULATED.3IONS-SCNC: 10 MMOL/L (ref 3–16)
AST SERPL-CCNC: 13 U/L (ref 15–37)
BASOPHILS ABSOLUTE: 0 K/UL (ref 0–0.2)
BASOPHILS RELATIVE PERCENT: 0.5 %
BILIRUB SERPL-MCNC: 0.4 MG/DL (ref 0–1)
BILIRUBIN URINE: NEGATIVE
BLOOD, URINE: NEGATIVE
BUN BLDV-MCNC: 23 MG/DL (ref 7–20)
CALCIUM SERPL-MCNC: 9.5 MG/DL (ref 8.3–10.6)
CHLORIDE BLD-SCNC: 101 MMOL/L (ref 99–110)
CLARITY: CLEAR
CO2: 25 MMOL/L (ref 21–32)
COLOR: YELLOW
CREAT SERPL-MCNC: 0.9 MG/DL (ref 0.8–1.3)
EOSINOPHILS ABSOLUTE: 0.1 K/UL (ref 0–0.6)
EOSINOPHILS RELATIVE PERCENT: 0.6 %
GFR AFRICAN AMERICAN: >60
GFR NON-AFRICAN AMERICAN: >60
GLOBULIN: 3.2 G/DL
GLUCOSE BLD-MCNC: 136 MG/DL (ref 70–99)
GLUCOSE URINE: NEGATIVE MG/DL
HCT VFR BLD CALC: 40.3 % (ref 40.5–52.5)
HEMOGLOBIN: 13.5 G/DL (ref 13.5–17.5)
KETONES, URINE: NEGATIVE MG/DL
LACTIC ACID: 0.9 MMOL/L (ref 0.4–2)
LEUKOCYTE ESTERASE, URINE: NEGATIVE
LYMPHOCYTES ABSOLUTE: 0.5 K/UL (ref 1–5.1)
LYMPHOCYTES RELATIVE PERCENT: 5 %
MCH RBC QN AUTO: 31.9 PG (ref 26–34)
MCHC RBC AUTO-ENTMCNC: 33.6 G/DL (ref 31–36)
MCV RBC AUTO: 95 FL (ref 80–100)
MICROSCOPIC EXAMINATION: ABNORMAL
MONOCYTES ABSOLUTE: 0.6 K/UL (ref 0–1.3)
MONOCYTES RELATIVE PERCENT: 6.1 %
NEUTROPHILS ABSOLUTE: 9.4 K/UL (ref 1.7–7.7)
NEUTROPHILS RELATIVE PERCENT: 87.8 %
NITRITE, URINE: NEGATIVE
PDW BLD-RTO: 12.8 % (ref 12.4–15.4)
PH UA: 7.5 (ref 5–8)
PLATELET # BLD: 371 K/UL (ref 135–450)
PMV BLD AUTO: 7 FL (ref 5–10.5)
POTASSIUM REFLEX MAGNESIUM: 4.2 MMOL/L (ref 3.5–5.1)
PRO-BNP: 386 PG/ML (ref 0–449)
PROTEIN UA: NEGATIVE MG/DL
RBC # BLD: 4.24 M/UL (ref 4.2–5.9)
SODIUM BLD-SCNC: 136 MMOL/L (ref 136–145)
SPECIFIC GRAVITY UA: 1.01 (ref 1–1.03)
TOTAL PROTEIN: 6.5 G/DL (ref 6.4–8.2)
TROPONIN: <0.01 NG/ML
URINE TYPE: ABNORMAL
UROBILINOGEN, URINE: 4 E.U./DL
WBC # BLD: 10.6 K/UL (ref 4–11)

## 2019-06-12 PROCEDURE — 87040 BLOOD CULTURE FOR BACTERIA: CPT

## 2019-06-12 PROCEDURE — 80053 COMPREHEN METABOLIC PANEL: CPT

## 2019-06-12 PROCEDURE — 87801 DETECT AGNT MULT DNA AMPLI: CPT

## 2019-06-12 PROCEDURE — 83605 ASSAY OF LACTIC ACID: CPT

## 2019-06-12 PROCEDURE — 85025 COMPLETE CBC W/AUTO DIFF WBC: CPT

## 2019-06-12 PROCEDURE — 93005 ELECTROCARDIOGRAM TRACING: CPT | Performed by: NURSE PRACTITIONER

## 2019-06-12 PROCEDURE — 6370000000 HC RX 637 (ALT 250 FOR IP): Performed by: NURSE PRACTITIONER

## 2019-06-12 PROCEDURE — 83880 ASSAY OF NATRIURETIC PEPTIDE: CPT

## 2019-06-12 PROCEDURE — 71045 X-RAY EXAM CHEST 1 VIEW: CPT

## 2019-06-12 PROCEDURE — 99285 EMERGENCY DEPT VISIT HI MDM: CPT

## 2019-06-12 PROCEDURE — 84484 ASSAY OF TROPONIN QUANT: CPT

## 2019-06-12 PROCEDURE — 51701 INSERT BLADDER CATHETER: CPT

## 2019-06-12 PROCEDURE — 81003 URINALYSIS AUTO W/O SCOPE: CPT

## 2019-06-12 RX ORDER — ACETAMINOPHEN 650 MG/1
650 SUPPOSITORY RECTAL ONCE
Status: COMPLETED | OUTPATIENT
Start: 2019-06-12 | End: 2019-06-12

## 2019-06-12 RX ORDER — ACETAMINOPHEN 500 MG
1000 TABLET ORAL ONCE
Status: DISCONTINUED | OUTPATIENT
Start: 2019-06-12 | End: 2019-06-12

## 2019-06-12 RX ADMIN — ACETAMINOPHEN 650 MG: 650 SUPPOSITORY RECTAL at 22:48

## 2019-06-12 NOTE — LETTER
Beneficiary Notification Letter     This [facility] Provider is Participating in an Innovative Payment and [address] [place] [address] [place] from Medicare     Greetings:   [facility] is participating in a Medicare initiative called the [facility] for [address] [facility]. You are receiving this letter because your health care provider has identified you as a patient who is receiving care through this initiative. Health care providers participating in the [facility] [address] [facility], including [facility], will work with Medicare to improve care for patients. Your Medicare rights have not been changed. You still have all the same Medicare rights and protections, including the right to choose which hospital, doctor, or other health care provider you see. However, because [facility] chose to participate in the [address] [place] [address], all Medicare beneficiaries who meet the eligibility criteria of this initiative will receive care under the initiative. If you do not wish to receive care under the Bundled Payments [facility] [address] [facility], you must choose a health care provider that does not participate in this initiative for your care. Regardless of which health care provider you see, Medicare will continue to cover all of your medically necessary services. Bundled Payments for Care Improvement Advanced aims to help improve your care     The Bundled Payments [facility] [address] [facility] is an innovative Medicare initiative that encourages your doctors, hospitals, and other health care providers to work more closely together so you get better care during and following certain hospital stays.  In this initiative, doctors and hospitals may work closely with certain health care providers and suppliers that help patients recover after discharge from the hospital, including skilled nursing facilities, home health agencies, inpatient rehabilitation facilities, and long term care hospitals. [facility] is working closely with the doctors and other health care providers that care for you during and following your hospital stay and for a period of time after you leave the hospital. By working together, the health care providers are trying to more efficiently provide well-managed, high quality, patient-centered care as you undergo treatment. Hospitals, doctors, and other health care providers that care for you following a hospital stay may receive an additional payment for providing better, more coordinated health care. Medicare will monitor your care to make sure you and others get high quality care. Your feedback is important     Medicare may also ask you to answer a survey about the services and care you received from [address] will be mailed to you. Your feedback will improve care for all people with Medicare who receive care from [facility]. Completion of this survey is optional.     Get more information     For more information about the Bundled Payments for [address] [place] [address], you can:    · Visit the CMS BPCI Advanced Website at http://hwang-alexander.net/ initiatives/bpci-advanced   · Call the [facility] BPCI-A team at (154) 831-2317. · Call 1-800-MEDICARE (9-353.696.8096). TTY users can call 9-646.363.9906     If you have concerns or complaints about your care, talk to your health care provider, or contact your Beneficiary and Family Centered Quality Improvement Organization [facility]). To get your [facility]'s phone number, visit Medicare.gov/contacts or call 1-800-MEDICARE. · To find a different hospital, visit www. hospitalcompare.[facility].gov or call 1-[employer] MEDICARE (1-371.737.3124). TTY users should call 7-781.362.3343.

## 2019-06-13 ENCOUNTER — APPOINTMENT (OUTPATIENT)
Dept: CT IMAGING | Age: 84
DRG: 195 | End: 2019-06-13
Payer: MEDICARE

## 2019-06-13 PROBLEM — J15.9 COMMUNITY ACQUIRED BACTERIAL PNEUMONIA: Status: ACTIVE | Noted: 2019-06-13

## 2019-06-13 LAB
ANION GAP SERPL CALCULATED.3IONS-SCNC: 10 MMOL/L (ref 3–16)
BUN BLDV-MCNC: 25 MG/DL (ref 7–20)
CALCIUM SERPL-MCNC: 9.4 MG/DL (ref 8.3–10.6)
CHLORIDE BLD-SCNC: 102 MMOL/L (ref 99–110)
CO2: 24 MMOL/L (ref 21–32)
CREAT SERPL-MCNC: 0.8 MG/DL (ref 0.8–1.3)
EKG ATRIAL RATE: 76 BPM
EKG DIAGNOSIS: NORMAL
EKG P AXIS: 57 DEGREES
EKG P-R INTERVAL: 218 MS
EKG Q-T INTERVAL: 456 MS
EKG QRS DURATION: 172 MS
EKG QTC CALCULATION (BAZETT): 513 MS
EKG R AXIS: 111 DEGREES
EKG T AXIS: 10 DEGREES
EKG VENTRICULAR RATE: 76 BPM
GFR AFRICAN AMERICAN: >60
GFR NON-AFRICAN AMERICAN: >60
GLUCOSE BLD-MCNC: 122 MG/DL (ref 70–99)
HCT VFR BLD CALC: 38.8 % (ref 40.5–52.5)
HEMOGLOBIN: 13.2 G/DL (ref 13.5–17.5)
LACTIC ACID: 0.7 MMOL/L (ref 0.4–2)
MCH RBC QN AUTO: 32.2 PG (ref 26–34)
MCHC RBC AUTO-ENTMCNC: 33.9 G/DL (ref 31–36)
MCV RBC AUTO: 94.9 FL (ref 80–100)
PDW BLD-RTO: 13 % (ref 12.4–15.4)
PLATELET # BLD: 337 K/UL (ref 135–450)
PMV BLD AUTO: 7 FL (ref 5–10.5)
POTASSIUM REFLEX MAGNESIUM: 3.9 MMOL/L (ref 3.5–5.1)
RBC # BLD: 4.09 M/UL (ref 4.2–5.9)
SODIUM BLD-SCNC: 136 MMOL/L (ref 136–145)
WBC # BLD: 7.6 K/UL (ref 4–11)

## 2019-06-13 PROCEDURE — 93010 ELECTROCARDIOGRAM REPORT: CPT | Performed by: INTERNAL MEDICINE

## 2019-06-13 PROCEDURE — 97163 PT EVAL HIGH COMPLEX 45 MIN: CPT

## 2019-06-13 PROCEDURE — 36415 COLL VENOUS BLD VENIPUNCTURE: CPT

## 2019-06-13 PROCEDURE — 6370000000 HC RX 637 (ALT 250 FOR IP): Performed by: INTERNAL MEDICINE

## 2019-06-13 PROCEDURE — 96365 THER/PROPH/DIAG IV INF INIT: CPT

## 2019-06-13 PROCEDURE — 92610 EVALUATE SWALLOWING FUNCTION: CPT

## 2019-06-13 PROCEDURE — 97535 SELF CARE MNGMENT TRAINING: CPT

## 2019-06-13 PROCEDURE — 71250 CT THORAX DX C-: CPT

## 2019-06-13 PROCEDURE — 1200000000 HC SEMI PRIVATE

## 2019-06-13 PROCEDURE — 6360000002 HC RX W HCPCS: Performed by: NURSE PRACTITIONER

## 2019-06-13 PROCEDURE — 2580000003 HC RX 258

## 2019-06-13 PROCEDURE — 83605 ASSAY OF LACTIC ACID: CPT

## 2019-06-13 PROCEDURE — 97530 THERAPEUTIC ACTIVITIES: CPT

## 2019-06-13 PROCEDURE — 94761 N-INVAS EAR/PLS OXIMETRY MLT: CPT

## 2019-06-13 PROCEDURE — 92526 ORAL FUNCTION THERAPY: CPT

## 2019-06-13 PROCEDURE — 85027 COMPLETE CBC AUTOMATED: CPT

## 2019-06-13 PROCEDURE — 80048 BASIC METABOLIC PNL TOTAL CA: CPT

## 2019-06-13 PROCEDURE — 51798 US URINE CAPACITY MEASURE: CPT

## 2019-06-13 PROCEDURE — 2580000003 HC RX 258: Performed by: NURSE PRACTITIONER

## 2019-06-13 PROCEDURE — 97167 OT EVAL HIGH COMPLEX 60 MIN: CPT

## 2019-06-13 PROCEDURE — 2700000000 HC OXYGEN THERAPY PER DAY

## 2019-06-13 RX ORDER — ONDANSETRON 2 MG/ML
4 INJECTION INTRAMUSCULAR; INTRAVENOUS EVERY 6 HOURS PRN
Status: DISCONTINUED | OUTPATIENT
Start: 2019-06-13 | End: 2019-06-17 | Stop reason: HOSPADM

## 2019-06-13 RX ORDER — ACETAMINOPHEN 650 MG/1
650 SUPPOSITORY RECTAL EVERY 4 HOURS PRN
Status: DISCONTINUED | OUTPATIENT
Start: 2019-06-13 | End: 2019-06-17 | Stop reason: HOSPADM

## 2019-06-13 RX ORDER — LEVETIRACETAM 500 MG/1
500 TABLET ORAL 2 TIMES DAILY
Status: DISCONTINUED | OUTPATIENT
Start: 2019-06-13 | End: 2019-06-17 | Stop reason: HOSPADM

## 2019-06-13 RX ORDER — IPRATROPIUM BROMIDE AND ALBUTEROL SULFATE 2.5; .5 MG/3ML; MG/3ML
1 SOLUTION RESPIRATORY (INHALATION)
Status: DISCONTINUED | OUTPATIENT
Start: 2019-06-13 | End: 2019-06-13

## 2019-06-13 RX ORDER — SODIUM CHLORIDE 0.9 % (FLUSH) 0.9 %
10 SYRINGE (ML) INJECTION EVERY 12 HOURS SCHEDULED
Status: DISCONTINUED | OUTPATIENT
Start: 2019-06-13 | End: 2019-06-17 | Stop reason: HOSPADM

## 2019-06-13 RX ORDER — ALBUTEROL SULFATE 2.5 MG/3ML
2.5 SOLUTION RESPIRATORY (INHALATION)
Status: DISCONTINUED | OUTPATIENT
Start: 2019-06-13 | End: 2019-06-13

## 2019-06-13 RX ORDER — IPRATROPIUM BROMIDE AND ALBUTEROL SULFATE 2.5; .5 MG/3ML; MG/3ML
1 SOLUTION RESPIRATORY (INHALATION) EVERY 4 HOURS PRN
Status: DISCONTINUED | OUTPATIENT
Start: 2019-06-13 | End: 2019-06-17 | Stop reason: HOSPADM

## 2019-06-13 RX ORDER — LEVOTHYROXINE SODIUM 0.1 MG/1
100 TABLET ORAL DAILY
Status: DISCONTINUED | OUTPATIENT
Start: 2019-06-13 | End: 2019-06-17 | Stop reason: HOSPADM

## 2019-06-13 RX ORDER — SODIUM CHLORIDE 0.9 % (FLUSH) 0.9 %
10 SYRINGE (ML) INJECTION PRN
Status: DISCONTINUED | OUTPATIENT
Start: 2019-06-13 | End: 2019-06-17 | Stop reason: HOSPADM

## 2019-06-13 RX ORDER — SODIUM CHLORIDE 9 MG/ML
INJECTION, SOLUTION INTRAVENOUS
Status: COMPLETED
Start: 2019-06-13 | End: 2019-06-13

## 2019-06-13 RX ADMIN — CEFEPIME HYDROCHLORIDE 2 G: 2 INJECTION, POWDER, FOR SOLUTION INTRAVENOUS at 01:48

## 2019-06-13 RX ADMIN — SODIUM CHLORIDE, PRESERVATIVE FREE 10 ML: 5 INJECTION INTRAVENOUS at 22:01

## 2019-06-13 RX ADMIN — LEVETIRACETAM 500 MG: 500 TABLET ORAL at 13:07

## 2019-06-13 RX ADMIN — CEFTRIAXONE SODIUM 1 G: 1 INJECTION, POWDER, FOR SOLUTION INTRAMUSCULAR; INTRAVENOUS at 04:17

## 2019-06-13 RX ADMIN — ENOXAPARIN SODIUM 40 MG: 40 INJECTION SUBCUTANEOUS at 09:30

## 2019-06-13 RX ADMIN — SODIUM CHLORIDE, PRESERVATIVE FREE 10 ML: 5 INJECTION INTRAVENOUS at 09:07

## 2019-06-13 RX ADMIN — LEVETIRACETAM 500 MG: 500 TABLET ORAL at 21:55

## 2019-06-13 RX ADMIN — AZITHROMYCIN MONOHYDRATE 500 MG: 500 INJECTION, POWDER, LYOPHILIZED, FOR SOLUTION INTRAVENOUS at 05:26

## 2019-06-13 RX ADMIN — LEVOTHYROXINE SODIUM 100 MCG: 100 TABLET ORAL at 13:07

## 2019-06-13 RX ADMIN — SODIUM CHLORIDE 500 ML: 9 INJECTION, SOLUTION INTRAVENOUS at 04:16

## 2019-06-13 ASSESSMENT — PAIN SCALES - GENERAL: PAINLEVEL_OUTOF10: 0

## 2019-06-13 NOTE — PROGRESS NOTES
Sounds: Diminshed bilaterally and/or crackles = 2    Sputum   ,  ,    Cough: Strong, spontaneous, non-productive = 0    Vital Signs   /62   Pulse 72   Temp 98 °F (36.7 °C) (Oral)   Resp 14   Ht 6' 0.01\" (1.829 m)   Wt 160 lb (72.6 kg)   SpO2 92%   BMI 21.70 kg/m²   SPO2 (COPD values may differ): Greater than or equal to 92% on room air = 0    Peak Flow (asthma only): not applicable = 0    RSI: 5-6 = Q4hr PRN (every four hours as needed) for dyspnea        Plan       Goals: mobilize retained secretions and volume expansion    Patient/caregiver was educated on the proper method of use for Respiratory Care Devices:  No: N/A      Level of patient/caregiver understanding able to:   ? Verbalize understanding   ? Demonstrate understanding       ? Teach back        ? Needs reinforcement       ? No available caregiver               ? Other:     Response to education:  N/A     Is patient being placed on Home Treatment Regimen? No     Does the patient have everything they need prior to discharge? NA     Comments:Patient assessed    Plan of Care: Duoneb Q4 prn    Electronically signed by Roscoe Drummond RCP on 6/13/2019 at 4:25 AM    Respiratory Protocol Guidelines     1. Assessment and treatment by Respiratory Therapy will be initiated for medication and therapeutic interventions upon initiation of aerosolized medication. 2. Physician will be contacted for respiratory rate (RR) greater than 35 breaths per minute. Therapy will be held for heart rate (HR) greater than 140 beats per minute, pending direction from physician. 3. Bronchodilators will be administered via Metered Dose Inhaler (MDI) with spacer when the following criteria are met:  a. Alert and cooperative     b. HR < 140 bpm  c. RR < 30 bpm                d. Can demonstrate a 2-3 second inspiratory hold  4. Bronchodilators will be administered via Hand Held Nebulizer MATIL[facility]) to patients when ANY of the following criteria are met  a.  Incognizant or uncooperative          b. Patients treated with HHN at Home        c. Unable to demonstrate proper use of MDI with spacer     d. RR > 30 bpm   5. Bronchodilators will be delivered via Metered Dose Inhaler (MDI), HHN, Aerogen to intubated patients on mechanical ventilation. 6. Inhalation medication orders will be delivered and/or substituted as outlined below. Aerosolized Medications Ordering and Administration Guidelines:    1. All Medications will be ordered by a physician, and their frequency and/or modality will be adjusted as defined by the patients Respiratory Severity Index (RSI) score. 2. If the patient does not have documented COPD, consider discontinuing anticholinergics when RSI is less than 9.  3. If the bronchospasm worsens (increased RSI), then the bronchodilator frequency can be increased to a maximum of every 4 hours. If greater than every 4 hours is required, the physician will be contacted. 4. If the bronchospasm improves, the frequency of the bronchodilator can be decreased, based on the patient's RSI, but not less than home treatment regimen frequency. 5. Bronchodilator(s) will be discontinued if patient has a RSI less than 9 and has received no scheduled or as needed treatment for 72  Hrs. Patients Ordered on a Mucolytic Agent:    1. Must always be administered with a bronchodilator. 2. Discontinue if patient experiences worsened bronchospasm, or secretions have lessened to the point that the patient is able to clear them with a cough. Anti-inflammatory and Combination Medications:    1. If the patient lacks prior history of lung disease, is not using inhaled anti-inflammatory medication at home, and lacks wheezing by examination or by history for at least 24 hours, contact physician for possible discontinuation.

## 2019-06-13 NOTE — CARE COORDINATION
CASE MANAGEMENT INITIAL ASSESSMENT      Reviewed chart and met with patient today, re: Triggered by age and diagnosis  , patient with hx of old CVA and aphasic       Family present: None  Primary contact information: Jovita Kim spouse [phone]    Admit date/status: Inpatient 6/13/19  Diagnosis: Community Acquired Pneumonia    Insurance: Medicare and Medicaid  Precert required for SNF - N        3 night stay required - Y    Living arrangements, Adls, care needs, prior to admission: Lives at [facility] long term care resident    Transportation: TBD    [address] at home: Defer to [address] in the home and/or outpatient, prior to admission: Lives at nursing home    PT/OT recs: OT- ECF without OT/ PT- pending [facility] Exemption Notification (HEN): N/A long term at [facility]    Barriers to discharge: None    Plan/comments: To return to the [facility] where he resides. He has Medicare and Medicaid and has a bed hold per Olga in admissions. He can return any time. Will follow for any barriers that arise that prevent patient from returning to [facility].      ECOC on chart for MD signature

## 2019-06-13 NOTE — ED NOTES
Bed: 02  Expected date:   Expected time:   Means of arrival:   Comments:   The King Dandre RN  06/12/19 2034

## 2019-06-13 NOTE — PLAN OF CARE
Problem: Nutrition  Intervention: Swallowing evaluation  Note:   Bedside swallow evaluation completed this date. Jeremy Guardado M.S. [address] [facility] [place], operated by [facility]  Speech-language pathologist  [id number]      Intervention: Aspiration precautions  Note:   Bedside swallow evaluation completed this date.     Jeremy Guardado M.S. [address] [facility] [place], operated [facility]  Speech-language pathologist  [id number]

## 2019-06-13 NOTE — PROGRESS NOTES
4 Eyes Skin Assessment     The patient is being assess for  Admission    I agree that 2 RN's have performed a thorough Head to Toe Skin Assessment on the patient. ALL assessment sites listed below have been assessed. Areas assessed by both nurses: Carol Long RN  [x]   Head, Face, and Ears   [x]   Shoulders, Back, and Chest  [x]   Arms, Elbows, and Hands   [x]   Coccyx, Sacrum, and Ischum  [x]   Legs, Feet, and Heels        Does the Patient have Skin Breakdown?   Yes a wound was noted on the Admission Assessment and an WOUND LDA was Initiated documentation include the Cris-wound, Wound Assessment, Measurements, Dressing Treatment, Drainage, and Color\",       Stage I R side coccyx (poss healing ulcer)     Randal Prevention initiated:  Yes   Wound Care Orders initiated:  Yes      [address]  nurse consulted for Pressure Injury (Stage 3,4, Unstageable, DTI, NWPT, and Complex wounds):  No      Nurse 1 eSignature: Electronically signed by Richy Rucker RN on 6/13/19 at 4:30 AM    **SHARE this note so that the co-signing nurse is able to place an eSignature**    Nurse 2 eSignature: Electronically signed by Alonso Jauregui RN on 6/13/19 at 7:36 AM

## 2019-06-13 NOTE — PROGRESS NOTES
Occupational Therapy   Occupational Therapy Initial Assessment and Treatment Note  Date: 2019   Patient Name: Rajendra Smith  MRN: [id number]     : 12/15/1931    Date of Service: 2019    Discharge Recommendations:  ECF without OT     Assessment   Performance deficits / Impairments: Decreased functional mobility ; Decreased endurance  Assessment: OT eval completed. Pt will be seen 1 additional session by OT services to assess pt's ability to safely transfer (pt refused today). Pt lives at [facility] and used w/c for mobility. Pt appears to be functioning close to baseline. Cont OT tx. Prognosis: Fair  Decision Making: High Complexity  Patient Education: role of OT, bed mobility, safety  REQUIRES OT FOLLOW UP: Yes  Activity Tolerance  Activity Tolerance: Patient Tolerated treatment well  Activity Tolerance: O2 sats 93% RA, HR 66; 3LO2  Safety Devices  Safety Devices in place: Yes  Type of devices: Left in bed;Bed alarm in place;Call light within reach;Nurse notified         Patient Diagnosis(es): The primary encounter diagnosis was Pneumonia due to organism. Diagnoses of Hypoxia, Altered mental status, unspecified altered mental status type, and Elevated temperature were also pertinent to this visit. has a past medical history of Coronary artery disease, CVA (cerebral infarction), Hypercholesteremia, Seizure, petit mal ([address] Thyroid disease, and Unspecified cerebral artery occlusion with cerebral infarction. has a past surgical history that includes Appendectomy; fracture surgery; Carotid endarterectomy (Left, 2013); Cardiac surgery; Coronary artery bypass graft (); and Cholecystectomy, laparoscopic (1-22-15).        Restrictions  Restrictions/Precautions  Restrictions/Precautions: Fall Risk  Position Activity Restriction  Other position/activity restrictions: up in chair, 3LO2    Subjective   General  Chart Reviewed: Yes, History and Physical, Progress Notes, Previous Admission, Orders, Labs, Imaging  Patient assessed for rehabilitation services?: Yes  Additional Pertinent Hx: Hx CVA with R side weakness  Family / Caregiver Present: No  Referring Practitioner: ELSA Pimentel  Diagnosis: CAP  General Comment  Comments: RN approved therapy      Social/Functional History  Social/Functional History  Lives With: Alone  Type of Home: Facility([facility])  Home Equipment: [employer]  ADL Assistance: Needs assistance  Homemaking Responsibilities: No  Ambulation Assistance: (non-ambulatory, uses w/c for mobility)  Additional Comments: Per previous OT/PT notes, pt uses w/c for mobility. Pt unable to communicate his present level of function at [facility]. Objective   Hearing: Exceptions to [facility]  Hearing Exceptions: Hard of hearing/hearing concerns(refuses to don hearing aid)    Orientation  Overall Orientation Status: (Oriented to place (GAGE place or time due to speech deficits))     Balance  Sitting Balance: Stand by assistance  Standing Balance: Unable to assess(comment)  Standing Balance  Activity: Pt sat EOB x5 and adamantly refused transfer to chair. Returned to supine with SBA. ADL  Grooming: Minimal assistance(min A to comb hair while seated EOB; SBA to wash face)  LE Dressing: Maximum assistance(socks )  Tone RUE  RUE Tone: Hypertonic  Tone LUE  LUE Tone: Normotonic  Coordination  Movements Are Fluid And Coordinated: No  Coordination and Movement description: Right UE(absent motor skills RUE (flexor tone))  Quality of Movement Other  Comment: R shoulder subluxation; partial flexion contractures of fingers R hand     Bed mobility  Supine to Sit: Minimal assistance(to L side with HOB elevated)  Sit to Supine: Stand by assistance     Cognition  Overall Cognitive Status: Exceptions  Arousal/Alertness: Delayed responses to stimuli  Following Commands: Follows one step commands with increased time; Follows one step commands with repetition  Attention Span: Attends with cues to redirect  Perception  Overall Perceptual Status: Impaired  Unilateral Attention: Cues to attend right visual field;Cues to attend to right side of body        Type of ROM/Therapeutic Exercise  Type of ROM/Therapeutic Exercise: PROM  Comment: semi reclined  Exercises  Shoulder Flexion: 3x  Elbow Flexion: 3x  Elbow Extension: 3x  Supination: 3x  Pronation: 3x     LUE AROM (degrees)  LUE AROM : WFL  RUE PROM (degrees)  RUE General PROM: Impaired RUE PROM (tolerated R shoulder PROM to 45*, unable to fully extend elbow due to tone or tolerate supination, partial finger extension)  LUE Strength  Gross LUE Strength: WFL      Plan   Plan  Times per week: 1 additional session      AM-PAC Score      AM-[facility] Inpatient Daily Activity Raw Score: 12 (06/13/19 1106)  AM-PAC Inpatient ADL T-Scale Score : 30.6 (06/13/19 1106)  ADL Inpatient CMS 0-100% Score: 66.57 (06/13/19 1106)  ADL Inpatient CMS G-Code Modifier : CL (06/13/19 1106)  Goals  Short term goals  Time Frame for Short term goals: 1x  Short term goal 1: Perform sit pivot transfer with min x1 by 6/15  Short term goal 2: Perform LUE exer 5-10x each     Therapy Time   Individual Concurrent Group Co-treatment   Time In 1004         Time Out 1028         Minutes 24         Timed Code Treatment Minutes: 14 Minutes(10 min eval )     Maryam GALLEGO/BUFFY

## 2019-06-13 NOTE — H&P
tamsulosin (FLOMAX) 0.4 MG capsule Take 2 capsules by mouth nightly 83/02/54  Yes Rashad Akhtar APRN - CNP   Mirabegron ER (MYRBETRIQ) 50 MG TB24 Take 50 mg by mouth daily   Yes Historical Provider, MD   rosuvastatin (CRESTOR) 20 MG tablet Take 20 mg by mouth daily   Yes Historical Provider, MD   b complex vitamins capsule Take 1 capsule by mouth daily   Yes Historical Provider, MD   ibuprofen (ADVIL;MOTRIN) 200 MG tablet Take 400 mg by mouth every 8 hours as needed for Pain   Yes Historical Provider, MD   magnesium hydroxide (MILK OF MAGNESIA) 400 MG/5ML suspension Take 30 mLs by mouth daily as needed for Constipation   Yes Historical Provider, MD   levothyroxine (SYNTHROID) 100 MCG tablet Take 1 tablet by mouth every morning (before breakfast) 9/21/17  Yes Gina Glover MD   acetaminophen (TYLENOL) 325 MG tablet Take 2 tablets by mouth every 4 hours as needed for Pain or Fever 7/5/17  Yes Mario Moctezuma MD   ferrous sulfate 325 (65 Fe) MG tablet Take 1 tablet by mouth 2 times daily (with meals) 7/5/17  Yes Mario Moctezuma MD   levETIRAcetam (KEPPRA) 500 MG tablet Take 1 tablet by mouth 2 times daily for 3 days 5/20/19 5/23/19  Fabienne Cornelius MD       Allergies:  Patient has no known allergies. Social History:      The patient currently lives in [facility]     TOBACCO:   reports that he quit smoking about 5 years ago. He has a 30.00 pack-year smoking history. He has never used smokeless tobacco.  ETOH:   reports that he does not drink alcohol. Family History:      Reviewed in detail and negative for DM, CAD, Cancer, CVA. Positive as follows:    History reviewed. No pertinent family history. REVIEW OF SYSTEMS:   Pertinent positives as noted in the HPI. All other systems reviewed and negative.     PHYSICAL EXAM:    /69   Pulse 63   Temp 97.6 °F (36.4 °C) (Oral)   Resp 16   Ht 6' 0.01\" (1.829 m)   Wt 160 lb (72.6 kg)   SpO2 93%   BMI 21.70 kg/m²     General appearance:  No apparent distress,

## 2019-06-13 NOTE — PROGRESS NOTES
Speech Language Pathology  Facility/Department: [facility] C3 TELE/MED SURG/ONC   CLINICAL BEDSIDE SWALLOW EVALUATION    NAME: Lansing Fleischer  : 12/15/1931  MRN: [id number]    ADMISSION DATE: 2019  ADMITTING DIAGNOSIS: has Cerebral infarction ([address] Coronary artery disease involving native coronary artery of native heart without angina pectoris; Ataxia; Seizure, petit mal ([address] Hospital psychosis [facility]); Intraparenchymal hemorrhage of brain ([address] Right upper quadrant pain; GI bleeding; Otitis externa; Physical deconditioning; Frequent falls; Hypothyroidism; Cerebral artery occlusion with cerebral infarction ([address] Left-sided weakness; Aphagia; Abdominal aortic aneurysm (AAA) without rupture ([address] Leukocytosis; Atelectasis; Elevated blood pressure reading; Hypothyroidism due to acquired atrophy of thyroid; Debility; Dyslipidemia; Cerebrovascular accident (CVA) ([address] Slurred speech; Arterial ischemic stroke, ICA, right, acute ([address] Remote history of stroke; HTN (hypertension), benign; CAD in native artery; Acute ischemic stroke ([address] Chronic cor pulmonale ([address] Centrilobular emphysema ([address] Vomiting; Chronic dementia, with behavioral disturbance; Intracranial hemorrhage ([address] and Community acquired bacterial pneumonia on their problem list.  ONSET DATE: Pt admitted to [facility] on 19    Recent Chest Xray/CT of Chest (19): Impression   Middle lobe and bilateral lower lobe atelectasis versus pneumonia.      Date of Eval: 2019  Evaluating Therapist: Aj Calle    Current Diet level:  Current Diet : NPO(Pending BSE)  Current Liquid Diet : NPO(Pending BSE)    Primary Complaint  Patient Complaint: n/a    Pain:  Pain Assessment: No c/o pain during BSE; pt shrugged shoulders when asked about pain    Reason for Referral  Lansing Fleischer was referred for a bedside swallow evaluation to assess the efficiency of his swallow function, identify signs and symptoms of aspiration and make recommendations regarding safe dietary consistencies, effective compensatory strategies, and safe eating environment. Impression  Dysphagia Diagnosis: Mild to moderate oral stage dysphagia;Mild to moderate pharyngeal stage dysphagia; Suspected needs further assessment  Dysphagia Outcome Severity Scale: Level 4: Mild moderate dysphagia- Intermittent supervision/cueing. One - two diet consistencies restricted   Pt seen upright in bed, alert and agreeable to evaluation, but fatigued and pleasantly confused (pt oriented to self only). RN OK'd SLP entry and evaluation. Pt currently on 3 L O2 NC. Pt had difficulty following commands to complete OM exam--reduced lingual / labial strength, ROM, and coordination suspected with observed PO trials this date. Pt completed delayed volitional swallow and weak volitional cough. Pt with hoarse, weak vocal quality. Pt observed with ice chips and thin liquid trials via tsp and cup with suspected premature bolus loss to pharynx and delayed swallow initiation observed. Pt with x1 immediate, strong cough post-swallow and wet vocal quality with TL via cup. Nectar-thick liquid then trialed via cup and straw with grossly timely swallow initiation, no overt s/s of aspiration/penetration throughout -- no coughing, throat clearing, wet vocal quality. Pt's O2 sats 91-93% throughout majority of trials, RR 12-16/min before, during, and after PO trials. Pt impulsive at times, taking large sips despite cueing / assistance with PO trials from SLP. Pt would benefit from assistance with meals and cues to take small, single sips. Recommend initiating Dental soft diet with nectar-thick liquids, meds in puree, assist feed and external pacing to take small bites / sips during meals. Pt would also benefit from instrumental swallow assessment to instrumentally assess the pharyngeal phase of the swallow and correlate clinical findings if consistent with pt's wishes / POC.   RN notified of recs.    Treatment Plan  Requires SLP Intervention: Yes  Duration/Frequency of Treatment: 3-5x/week for LOS  D/C Recommendations: SNF     Recommended Diet and Intervention  Diet Solids Recommendation: Dental Soft  Liquid Consistency Recommendation: Nectar  Recommended Form of Meds: Meds in puree  Recommendations: Modified barium swallow study; Dysphagia treatment; Therapeutic feeds with SLP only  Therapeutic Interventions: Diet tolerance monitoring;Patient/Family education; Therapeutic PO trials with SLP    Compensatory Swallowing Strategies  Compensatory Swallowing Strategies: Small bites/sips;Upright as possible for all oral intake;Remain upright for 30-45 minutes after meals;Eat/Feed slowly; Assist feed; External pacing    Treatment/Goals  Short-term Goals  Timeframe for Short-term Goals: 5 days (6/18/19)  Long-term Goals  Timeframe for Long-term Goals: 7 days (6/20/19)  Goal 1: The pt will tolerate safest and least restrictive diet without s/s of aspiration. Dysphagia Goals: The patient will tolerate recommended diet without observed clinical signs of aspiration; The patient/caregiver will demonstrate understanding of compensatory strategies for improved swallowing safety. ;The patient will tolerate instrumental swallowing procedure    General  Chart Reviewed: Yes  Comments: Pt admitted d/t AMS, PNA, and hypoxia. Pt has hx of CVA, seizure, and recent subdural hemorrhage per chart. Per RN at the [facility], pt was on thickened liquids, however, was refusing them. Behavior/Cognition: Alert; Cooperative;Confused; Requires cueing  Respiratory Status: O2 via nasual cannula  O2 Device: Nasal cannula  Liters of Oxygen: 3 L  Communication Observation: Aphasia; Apraxia; Dysarthria  Follows Directions: Simple(With cueing)  Dentition: Dentures top(Pt attempted to place dentures, however, immediately took them out.   Pt refused to put dentures in)  Patient Positioning: Upright in bed  Baseline Vocal Quality: Weak  Volitional Cough: Weak  Volitional Swallow: Delayed  Prior Dysphagia History: Pt well known to , seen during multiple admissions in the past.  Pt had MBSS completed on 9/19/17 which recommended a Regular solid diet with thin liquids via tsp or straw. Pt most recently seen in May 2019 at [facility] and was tolerating a Regular solid diet with thin liquids. Per RN via phone from the [facility], the pt was on thickened liquids, however, was refusing them. Consistencies Administered: Mechanical soft;Puree; Thin - teaspoon; Thin - cup; Ice Chips; Nectar - cup;Nectar - straw;Nectar - teaspoon    Vision/Hearing  Vision  Vision: Within Functional Limits  Hearing  Hearing: Exceptions to [facility]  Hearing Exceptions: Hard of hearing/hearing concerns    Oral Motor Deficits  Oral/Motor  Oral Motor: Exceptions to WFL(Pt had difficulty following commands to complete OM exam; reduced lingual / labial strength, ROM, and coordination suspected with observed PO trials this date)  Labial ROM: Reduced left; Reduced right  Labial Strength: Reduced  Labial Coordination: Reduced  Lingual ROM: Reduced right;Reduced left  Lingual Strength: Reduced  Lingual Coordination: Reduced    Oral Phase Dysfunction  Oral Phase  Oral Phase: Exceptions  Oral Phase Dysfunction  Suspected Premature Bolus Loss: All  Oral Phase  Oral Phase - Comment: Pt edentulous (attempted to place top dentures, however, pt refused). Weak lingual manipulation and reduced bolus control observed with all PO trials. Indicators of Pharyngeal Phase Dysfunction   Pharyngeal Phase  Pharyngeal Phase: Exceptions  Indicators of Pharyngeal Phase Dysfunction  Delayed Swallow: All  Decreased Laryngeal Elevation: All  Cough - Immediate: Thin - cup  Pharyngeal Phase   Pharyngeal: Pt impulsive at times, taking large sips despite cueing / assistance with PO trials from SLP. Pt would benefit from assistance with meals and cues to take small, single sips.   Pt's O2 sats 91-93% throughout majority of trials, RR

## 2019-06-13 NOTE — ED NOTES
Pt straight-cathed for urine specimen following sterile technique. Pt tolerated well.      Reuel Cranker, RN  06/12/19 2400

## 2019-06-13 NOTE — PROGRESS NOTES
Pt is alert and presents w/ aphasia. Unable to answer orientation questions. Was unable to complete all admission questions r/t pt not being able to answer several questions. Pt was moaning/groaning/upset and stating pain when arrived on the floor--calmed down and expressed no pain after pt voided in urinal. VSS. SpO2 92% on 3 L oxygen. No food or drink at bedside table r/t NPO status. Heels floated. Mepilex on coccyx. Fall bracelet and yellow non skid socks on. Avasys turned on for pt safety. Bed alarm on. Bed locked in lowest position; side rails 3/4; call light within reach; will continue to monitor.

## 2019-06-13 NOTE — ED PROVIDER NOTES
I personally interviewed and examined this patient, discussed the findings, diagnostic studies, interventions and treatment plan with SOPHY. . I reviewed the clinical notes and test results. I personally supervised all pertinent procedures performed on the patient by the SOPHY throughout the course and was available to manage complications as they arose, if applicable. I agree with the assessment, management and disposition as presented by the SOPHY with exceptions/corrections as documented. Per nurse caring for patient. From ECF-requiring 2 person assist today and low grade fever. Needs thickened liquids. Was a stand and pivot, now requiring a full lift. Patient won't eat, reportedly he feels bad. Lungs are diminished bilaterally, pt does appear weak and fatigued. He does have a low grade temp of 100.3F here. CT shows some ground glass opacities as well. Will treat as pna, admit.      For further details of this emergency department encounter, please see the SOPHY's documentation.       ED Triage Vitals [06/12/19 2038]   BP Temp Temp Source Pulse Resp SpO2 Height Weight   120/67 98.5 °F (36.9 °C) Oral 88 18 93 % -- --        Results for orders placed or performed during the hospital encounter of 06/12/19   CBC Auto Differential   Result Value Ref Range    WBC 10.6 4.0 - 11.0 K/uL    RBC 4.24 4.20 - 5.90 M/uL    Hemoglobin 13.5 13.5 - 17.5 g/dL    Hematocrit 40.3 (L) 40.5 - 52.5 %    MCV 95.0 80.0 - 100.0 fL    MCH 31.9 26.0 - 34.0 pg    MCHC 33.6 31.0 - 36.0 g/dL    RDW 12.8 12.4 - 15.4 %    Platelets 165 928 - 836 K/uL    MPV 7.0 5.0 - 10.5 fL    Neutrophils % 87.8 %    Lymphocytes % 5.0 %    Monocytes % 6.1 %    Eosinophils % 0.6 %    Basophils % 0.5 %    Neutrophils # 9.4 (H) 1.7 - 7.7 K/uL    Lymphocytes # 0.5 (L) 1.0 - 5.1 K/uL    Monocytes # 0.6 0.0 - 1.3 K/uL    Eosinophils # 0.1 0.0 - 0.6 K/uL    Basophils # 0.0 0.0 - 0.2 K/uL   Comprehensive Metabolic Panel w/ Reflex to MG   Result Value Ref Range Sodium 136 136 - 145 mmol/L    Potassium reflex Magnesium 4.2 3.5 - 5.1 mmol/L    Chloride 101 99 - 110 mmol/L    CO2 25 21 - 32 mmol/L    Anion Gap 10 3 - 16    Glucose 136 (H) 70 - 99 mg/dL    BUN 23 (H) 7 - 20 mg/dL    CREATININE 0.9 0.8 - 1.3 mg/dL    GFR Non-African American >60 >60    GFR African American >60 >60    Calcium 9.5 8.3 - 10.6 mg/dL    Total Protein 6.5 6.4 - 8.2 g/dL    Alb 3.3 (L) 3.4 - 5.0 g/dL    Albumin/Globulin Ratio 1.0 (L) 1.1 - 2.2    Total Bilirubin 0.4 0.0 - 1.0 mg/dL    Alkaline Phosphatase 78 40 - 129 U/L    ALT 15 10 - 40 U/L    AST 13 (L) 15 - 37 U/L    Globulin 3.2 g/dL   Lactic Acid, Plasma   Result Value Ref Range    Lactic Acid 0.9 0.4 - 2.0 mmol/L   Urinalysis, reflex to microscopic   Result Value Ref Range    Color, UA Yellow Straw/Yellow    Clarity, UA Clear Clear    Glucose, Ur Negative Negative mg/dL    Bilirubin Urine Negative Negative    Ketones, Urine Negative Negative mg/dL    Specific Gravity, UA 1.010 1.005 - 1.030    Blood, Urine Negative Negative    pH, UA 7.5 5.0 - 8.0    Protein, UA Negative Negative mg/dL    Urobilinogen, Urine 4.0 (A) <2.0 E.U./dL    Nitrite, Urine Negative Negative    Leukocyte Esterase, Urine Negative Negative    Microscopic Examination Not Indicated     Urine Type Not Specified    Troponin   Result Value Ref Range    Troponin <0.01 <0.01 ng/mL   Brain Natriuretic Peptide   Result Value Ref Range    Pro- 0 - 449 pg/mL   EKG 12 Lead   Result Value Ref Range    Ventricular Rate 76 BPM    Atrial Rate 76 BPM    P-R Interval 218 ms    QRS Duration 172 ms    Q-T Interval 456 ms    QTc Calculation (Bazett) 513 ms    P Axis 57 degrees    R Axis 111 degrees    T Axis 10 degrees    Diagnosis       Sinus rhythm with 1st degree A-V blockRight bundle branch blockLeft posterior fascicular block** Bifascicular block *T wave abnormality, consider inferior ischemiaAbnormal ECGWhen compared with ECG of 17-MAY-2019 10:41,T wave inversion now evident in Inferior leadsNonspecific T wave abnormality no longer evident in Lateral leadsQT has lengthened       CT CHEST WO CONTRAST   Final Result   Middle lobe and bilateral lower lobe atelectasis versus pneumonia. XR CHEST PORTABLE   Final Result   Reticular and ground-glass opacities have slightly increased since 2017. Pattern may represent progressive interstitial lung disease or mild edema. CLINICAL IMPRESSION  1. Pneumonia due to organism    2. Hypoxia    3. Altered mental status, unspecified altered mental status type    4. Elevated temperature        Blood pressure (!) 100/51, pulse 72, temperature 100.3 °F (37.9 °C), temperature source Rectal, resp. rate 12, SpO2 94 %. DISPOSITION  Alea Valle was admitted in stable condition. This chart was generated in part by using Dragon Dictation system and may contain errors related to that system including errors in grammar, punctuation, and spelling, as well as words and phrases that may be inappropriate. If there are any questions or concerns please feel free to contact the dictating provider for clarification.      Layo Castro DO  ATTENDING, EMERGENCY MEDICINE       Fredis Quick DO  06/18/19 1918

## 2019-06-13 NOTE — ED PROVIDER NOTES
[address] [facility]  ED    CHIEF COMPLAINT  Altered Mental Status (from the [facility], reportedly lethargic, weak, pt aphasic from previous CVA)    [address] Bartolo Garcia is a 80 y.o. male who presents to the ED with reports of AMS. Per nurse caring for patient. From ECF-requiring 2 person assist today and low grade fever. Do not know what low grade fever. Squad report-EMS called to [facility] for status change. Temp 100.5 axillary. O2 sat 89-90%, no report of him wearing oxygen at the ECF. , /74. Needs thickened liquids. Was a stand and pivot, now requiring a full lift. Patient won't eat, reportedly he feels bad. Concern for dehydration vs PNA. The patient arrived to the ED via EMS transport.     PAST MEDICAL HISTORY    Past Medical History:   Diagnosis Date    Coronary artery disease     CVA (cerebral infarction)     L frontal (11/2013)    Hypercholesteremia     Seizure, petit mal ([address] 11/28/2013    Thyroid disease     Unspecified cerebral artery occlusion with cerebral infarction     Right sided weakness - uses cane       SURGICAL HISTORY    Past Surgical History:   Procedure Laterality Date    APPENDECTOMY      CARDIAC SURGERY      stents 11/11    CAROTID ENDARTERECTOMY Left 11-    CHOLECYSTECTOMY, LAPAROSCOPIC  1-22-15    CORONARY ARTERY BYPASS GRAFT  2009    FRACTURE SURGERY         CURRENT MEDICATIONS    Current Outpatient Rx   Medication Sig Dispense Refill    aspirin EC 81 MG EC tablet Take 1 tablet by mouth daily Start taking medication on 5/31/19 30 tablet 0    amLODIPine (NORVASC) 2.5 MG tablet Take 1 tablet by mouth daily 30 tablet 3    polyethylene glycol (GLYCOLAX) packet Take 17 g by mouth daily      citalopram (CELEXA) 10 MG tablet Take 10 mg by mouth daily      melatonin 3 MG TABS tablet Take 3 mg by mouth nightly      tamsulosin (FLOMAX) 0.4 MG capsule Take 2 capsules by mouth nightly 30 capsule 3    Mirabegron ER (MYRBETRIQ) 50 MG TB24 Take 50 mg by mouth daily      rosuvastatin (CRESTOR) 20 MG tablet Take 20 mg by mouth daily      b complex vitamins capsule Take 1 capsule by mouth daily      ibuprofen (ADVIL;MOTRIN) 200 MG tablet Take 400 mg by mouth every 8 hours as needed for Pain      magnesium hydroxide (MILK OF MAGNESIA) 400 MG/5ML suspension Take 30 mLs by mouth daily as needed for Constipation      levothyroxine (SYNTHROID) 100 MCG tablet Take 1 tablet by mouth every morning (before breakfast)      acetaminophen (TYLENOL) 325 MG tablet Take 2 tablets by mouth every 4 hours as needed for Pain or Fever 120 tablet 3    ferrous sulfate 325 (65 Fe) MG tablet Take 1 tablet by mouth 2 times daily (with meals) 60 tablet 1    levETIRAcetam (KEPPRA) 500 MG tablet Take 1 tablet by mouth 2 times daily for 3 days 6 tablet 0     ALLERGIES    No Known Allergies    FAMILY HISTORY    History reviewed. No pertinent family history.     SOCIAL HISTORY    Social History     Socioeconomic History    Marital status:      Spouse name: None    Number of children: None    Years of education: None    Highest education level: None   Occupational History    None   Social Needs    Financial resource strain: None    Food insecurity:     Worry: None     Inability: None    Transportation needs:     Medical: None     Non-medical: None   Tobacco Use    Smoking status: Former Smoker     Packs/day: 0.50     Years: 60.00     Pack years: 30.00     Last attempt to quit: 2013     Years since quittin.6    Smokeless tobacco: Never Used   Substance and Sexual Activity    Alcohol use: No    Drug use: No    Sexual activity: None   Lifestyle    Physical activity:     Days per week: None     Minutes per session: None    Stress: None   Relationships    Social connections:     Talks on phone: None     Gets together: None     Attends Gnosticism service: None     Active member of club or organization: None     Attends meetings of [facility]  [place], [address]   Phone [phone]   COMPREHENSIVE METABOLIC PANEL W/ REFLEX TO MG FOR LOW K - Abnormal; Notable for the following components:    Glucose 136 (*)     BUN 23 (*)     Alb 3.3 (*)     Albumin/Globulin Ratio 1.0 (*)     AST 13 (*)     All other components within normal limits    Narrative:     Performed at:  [address],  [place], [address]   Phone [phone]   URINALYSIS - Abnormal; Notable for the following components:    Urobilinogen, Urine 4.0 (*)     All other components within normal limits    Narrative:     Performed at:  [address],  [place], [address]   Phone [phone]   CULTURE BLOOD #1   CULTURE BLOOD #2   LACTIC ACID, PLASMA    Narrative:     Performed at:  [address],  [place], [address]   Phone [phone]   TROPONIN    Narrative:     Performed at:  [address],  [place], [address]   Phone [phone]    Narrative:     Performed at:  [address],  [place], [address]   Phone [phone]   LACTIC ACID, PLASMA    Narrative:     Performed at:  [address],  [place], [address]   Phone [phone]   BASIC METABOLIC PANEL W/ REFLEX TO MG FOR LOW K   CBC         RADIOLOGY    Ct Chest Wo Contrast    Result Date: 6/13/2019  EXAMINATION: CT OF THE CHEST WITHOUT CONTRAST 6/13/2019 12:31 am TECHNIQUE: CT of the chest was performed without the administration of intravenous contrast. Multiplanar reformatted images are provided for review. Dose modulation, iterative reconstruction, and/or weight based adjustment of the mA/kV was utilized to reduce the radiation dose to as low as reasonably achievable. COMPARISON: 09/19/2017 HISTORY: ORDERING SYSTEM PROVIDED HISTORY: hypoxia TECHNOLOGIST PROVIDED HISTORY: Ordering Physician Provided Reason for Exam: lethargic, hypoxic, dementia, unable to raise arms Acuity: Acute Type of Exam: Initial Additional signs and symptoms: Former Smoker (Quit Date: 11/01/2013), 0.5 ppd, 30 pack-years FINDINGS: Mediastinum: The heart size is within normal limits. There is no pericardial effusion. Aortic caliber is within normal limits. Mediastinal lymph nodes are unchanged. Lungs/pleura: Pleural plaques are again noted. There is no pleural effusion or pneumothorax. There is ill-defined airspace disease in the middle lobe and both lower lobes. Upper Abdomen: No abnormalities are identified in the visualized upper abdomen. Soft Tissues/Bones: No acute findings. Middle lobe and bilateral lower lobe atelectasis versus pneumonia. Xr Chest Portable    Result Date: 6/12/2019  EXAMINATION: ONE XRAY VIEW OF THE CHEST 6/12/2019 9:54 pm COMPARISON: 05/17/2019 radiograph HISTORY: ORDERING SYSTEM PROVIDED HISTORY: hypoxia TECHNOLOGIST PROVIDED HISTORY: Reason for exam:->hypoxia Ordering Physician Provided Reason for Exam: hypoxia Acuity: Acute Type of Exam: Initial FINDINGS: Postsurgical change of CABG. Heart is enlarged. Mild pulmonary vascular congestion centrally. Underlying COPD with diffuse reticular and ground-glass opacities in the periphery of the lungs. The overall pattern has slightly increased since 2017. There are no significant skeletal findings. Reticular and ground-glass opacities have slightly increased since 2017. Pattern may represent progressive interstitial lung disease or mild edema. ED COURSE/MDM  Patient seen and evaluated. Old records reviewed. Diagnostic testing reviewed and results discussed. I have seen and evaluated this patient with supervising physician: Rome Shannon DO.  We thoroughly discussed the history, physical exam, diagnostic testing, emergency department course, plan and disposition. Sandy Jurado presented to the ED today with above noted complaints. Physical exam is quite limited as the patient can not verbalize his complaints. I am also unable to get a good assessment of the patient's breath sounds, he is noted to be somewhat hypoxic, he has been placed on oxygen and is currently maintaining his saturations well. Chest x-ray showed reticular groundglass opacities that have slightly increased since 2017. I did obtain a CT of the chest and that shows middle lobe and bilateral lower lobe atelectasis versus pneumonia. Patient had a normal temperature on arrival to the ED, this was orally. I did have the nursing staff obtain a rectal exam and this was elevated at 100.3. CBC does not show leukocytosis but absolute neutrophils are elevated at 9.4, absolute lymphocytes low at 0.5. No further differential shift. Hemoglobin is normal, hematocrit slightly low at 40.3 but this is stable. CMP is without electrolyte abnormality. No evidence of kidney or liver dysfunction. UA was obtained by straight cath and without evidence of infection. Lactic acid level is normal.  Troponin is negative. BNP normal at 386. I am going to treat the patient for pneumonia with cefepime. Blood cultures were obtained prior to initiating antibiotics. Given the patient's age and comorbidities I do feel he will require admission for further management. Pt was given the following medications or treatments in the ED:   Medications   acetaminophen (TYLENOL) suppository 650 mg (650 mg Rectal Given 6/12/19 2248)   cefepime (MAXIPIME) 2 g in dextrose 5 % 50 mL IVPB (0 g Intravenous Stopped 6/13/19 0213)     I spoke with Trell Richards CNP. We thoroughly discussed the history, physical exam, laboratory and imaging studies, as well as, emergency department course.  Based upon that discussion, we've decided to admit Sandy Jurado for further observation and evaluation of Akiko Baldwin's dyspnea. As I have deemed necessary from their history, physical, and studies, I have considered and evaluated Faustino Cueto for the following diagnoses:  ACUTE CORONARY SYNDROME, CHRONIC OBSTRUCTIVE PULMONARY DISEASE, CONGESTIVE HEART FAILURE, PERICARDIAL TAMPONADE, PNEUMONIA, PNEUMOTHORAX, PULMONARY EMBOLISM, SEPSIS, and THORACIC DISSECTION. CLINICAL IMPRESSION    1. Pneumonia due to organism    2. Hypoxia    3. Altered mental status, unspecified altered mental status type    4. Elevated temperature       DISPOSITION  Patient admitted in stable condition. Comment: Please note this report has been produced using speech recognition software and may contain errors related to that system including errors in grammar, punctuation, and spelling, as well as words and phrases that may be inappropriate. If there are any questions or concerns please feel free to contact the dictating provider for clarification.         ALEXA Wesley CNP  06/13/19 1780

## 2019-06-13 NOTE — PROGRESS NOTES
Physical Therapy    Facility/Department: [facility] C3 TELE/MED SURG/ONC  Initial Assessment and treatment    NAME: Ирина Dominique  : 12/15/1931  MRN: [id number]    Date of Service: 2019    Discharge Recommendations:  ECF without PT   PT Equipment Recommendations  Equipment Needed: No    Assessment   Body structures, Functions, Activity limitations: Decreased functional mobility ; Decreased ROM; Decreased strength;Decreased balance  Assessment: Pt referred for PT evaluation during current hospital stay with a diagnosis of CAP. PT functioning very near baseline, SBA to Birdie for bed mobility, unable to assess transfers at [facility] [place] time due to refusal.  Pt would benefit from one additional  session to trial bed <>chair transfers. Recommend ECF without PT at DC from acute setting  Treatment Diagnosis: decreased mobility  Prognosis: Fair  Decision Making: High Complexity  Patient Education: role of PT, bed mobility, ther ex  Barriers to Learning: cognition  REQUIRES PT FOLLOW UP: Yes(1 additional session for bed<>chair transfers)  Activity Tolerance  Activity Tolerance: Patient limited by cognitive status       Patient Diagnosis(es): The primary encounter diagnosis was Pneumonia due to organism. Diagnoses of Hypoxia, Altered mental status, unspecified altered mental status type, and Elevated temperature were also pertinent to this visit. has a past medical history of Coronary artery disease, CVA (cerebral infarction), Hypercholesteremia, Seizure, petit mal ([address] Thyroid disease, and Unspecified cerebral artery occlusion with cerebral infarction. has a past surgical history that includes Appendectomy; fracture surgery; Carotid endarterectomy (Left, 2013); Cardiac surgery; Coronary artery bypass graft (); and Cholecystectomy, laparoscopic (1-22-15).     Restrictions  Restrictions/Precautions  Restrictions/Precautions: Fall Risk  Position Activity Restriction  Other position/activity restrictions: up in chair, 3LO2  Vision/Hearing  Vision: Within Functional Limits  Hearing: Exceptions to [facility]  Hearing Exceptions: Hard of hearing/hearing concerns     Subjective  General  Chart Reviewed: Yes  Patient assessed for rehabilitation services?: Yes  Response To Previous Treatment: Not applicable  Family / Caregiver Present: No  Referring Practitioner: LAW ruiz  Referral Date : 06/13/19  Diagnosis: CAP  Follows Commands: Impaired  Other (Comment): slow processing  General Comment  Comments: Pt resting in bed upon entry, rN cleared pt for therapy  Subjective  Subjective: Pt agreeable to therapy with encouragement  Pain Screening  Patient Currently in Pain: (pt sis not indicate any pain during session)  Vital Signs  Patient Currently in Pain: (pt sis not indicate any pain during session)  Pre Treatment Pain Screening  Intervention List: Patient able to continue with treatment    Orientation  Orientation  Overall Orientation Status: (unable to assess due to aphasia)  Social/Functional History  Social/Functional History  Lives With: Alone  Type of Home: Facility([facility])  Home Equipment: [employer]  ADL Assistance: Needs assistance  Homemaking Responsibilities: No  Ambulation Assistance: (non-ambulatory, uses w/c for mobility)  Additional Comments: Per previous OT/PT notes, pt uses w/c for mobility. Pt unable to communicate his present level of function at [facility].           Objective          AROM RLE (degrees)  RLE General AROM: limited due to CVA, hypertonicity in knee flexors, unable to DF ankle  AROM LLE (degrees)  LLE AROM : WFL  Strength RLE  Comment: ankle DF 1/5; knee 3/5, hip 3/5  Strength LLE  Strength LLE: WFL  Comment: grossly 4+/5 throughout  Tone RLE  RLE Tone: Hypertonic  Tone LLE  LLE Tone: Normotonic  Motor Control  Gross Motor?: (discontinuous movements RLE)     Bed mobility  Supine to Sit: Minimal assistance(to L with HOB elevated and use of bedrail)  Sit to Supine: Stand by assistance  Scooting: Stand by assistance(to EOB)  Transfers  Sit to Stand: Unable to assess(pt refusing attempt to stand)  Stand to sit: Unable to assess  Bed to Chair: Unable to assess(refusing bed to chair transfer)  Ambulation  Ambulation?: No(pt non ambulatory at baseline)     Balance  Posture: Fair  Sitting - Static: Good;-  Sitting - Dynamic: Fair;+  Exercises  Straight Leg Raise: x 5 B  Knee Long Arc Quad: x 10 B with assist for full ROM   Ankle Pumps: x 10 LLE, PROM RLE x 10 limited ROM     Plan   Plan  Times per week: 3-5  Current Treatment Recommendations: Strengthening, ROM, Balance Training, Functional Mobility Training, Endurance Training, Transfer Training  Safety Devices  Type of devices: All fall risk precautions in place, Bed alarm in place, Call light within reach, Nurse notified, Gait belt, Patient at risk for falls, Left in bed      AM-PAC Score     AM-PAC Inpatient Mobility without Stair Climbing Raw Score : 9 (06/13/19 1325)  AM-PAC Inpatient without Stair Climbing T-Scale Score : 32.44 (06/13/19 1325)  Mobility Inpatient CMS 0-100% Score: 76.07 (06/13/19 1325)  Mobility Inpatient without Stair CMS G-Code Modifier : CL (06/13/19 1325)       Goals  Short term goals  Time Frame for Short term goals: 1 additional session to address bed<>chair transfers  Short term goal 1: Pt will perform bed mobility with Birdie by 6/15/19; goal met 6/13/19  Short term goal 2: Pt will transfer bed<>chair with mod A x2 with LRAD  Patient Goals   Patient goals : pt unable to state       Therapy Time   Individual Concurrent Group Co-treatment   Time In 1004         Time Out 1028         Minutes 24         Timed Code Treatment Minutes: 14 Minutes    If pt is discharged prior to next therapy session, this note will serve as discharge summary.     Zoila Pulse, PT

## 2019-06-14 LAB
ALBUMIN SERPL-MCNC: 2.9 G/DL (ref 3.4–5)
ANION GAP SERPL CALCULATED.3IONS-SCNC: 10 MMOL/L (ref 3–16)
BUN BLDV-MCNC: 22 MG/DL (ref 7–20)
CALCIUM SERPL-MCNC: 9 MG/DL (ref 8.3–10.6)
CHLORIDE BLD-SCNC: 102 MMOL/L (ref 99–110)
CO2: 25 MMOL/L (ref 21–32)
CREAT SERPL-MCNC: 0.7 MG/DL (ref 0.8–1.3)
GFR AFRICAN AMERICAN: >60
GFR NON-AFRICAN AMERICAN: >60
GLUCOSE BLD-MCNC: 92 MG/DL (ref 70–99)
HCT VFR BLD CALC: 37.5 % (ref 40.5–52.5)
HEMOGLOBIN: 12.5 G/DL (ref 13.5–17.5)
MCH RBC QN AUTO: 32.1 PG (ref 26–34)
MCHC RBC AUTO-ENTMCNC: 33.4 G/DL (ref 31–36)
MCV RBC AUTO: 96.2 FL (ref 80–100)
PDW BLD-RTO: 13.2 % (ref 12.4–15.4)
PHOSPHORUS: 3.4 MG/DL (ref 2.5–4.9)
PLATELET # BLD: 328 K/UL (ref 135–450)
PMV BLD AUTO: 6.9 FL (ref 5–10.5)
POTASSIUM SERPL-SCNC: 4 MMOL/L (ref 3.5–5.1)
RBC # BLD: 3.9 M/UL (ref 4.2–5.9)
REPORT: NORMAL
SODIUM BLD-SCNC: 137 MMOL/L (ref 136–145)
WBC # BLD: 5.7 K/UL (ref 4–11)

## 2019-06-14 PROCEDURE — 6360000002 HC RX W HCPCS: Performed by: NURSE PRACTITIONER

## 2019-06-14 PROCEDURE — 1200000000 HC SEMI PRIVATE

## 2019-06-14 PROCEDURE — 36415 COLL VENOUS BLD VENIPUNCTURE: CPT

## 2019-06-14 PROCEDURE — 2700000000 HC OXYGEN THERAPY PER DAY

## 2019-06-14 PROCEDURE — 6370000000 HC RX 637 (ALT 250 FOR IP): Performed by: INTERNAL MEDICINE

## 2019-06-14 PROCEDURE — 92526 ORAL FUNCTION THERAPY: CPT

## 2019-06-14 PROCEDURE — 80069 RENAL FUNCTION PANEL: CPT

## 2019-06-14 PROCEDURE — 2580000003 HC RX 258: Performed by: NURSE PRACTITIONER

## 2019-06-14 PROCEDURE — 94761 N-INVAS EAR/PLS OXIMETRY MLT: CPT

## 2019-06-14 PROCEDURE — 2580000003 HC RX 258

## 2019-06-14 PROCEDURE — 85027 COMPLETE CBC AUTOMATED: CPT

## 2019-06-14 RX ORDER — SODIUM CHLORIDE 9 MG/ML
INJECTION, SOLUTION INTRAVENOUS
Status: COMPLETED
Start: 2019-06-14 | End: 2019-06-14

## 2019-06-14 RX ADMIN — LEVETIRACETAM 500 MG: 500 TABLET ORAL at 22:01

## 2019-06-14 RX ADMIN — LEVETIRACETAM 500 MG: 500 TABLET ORAL at 09:16

## 2019-06-14 RX ADMIN — SODIUM CHLORIDE, PRESERVATIVE FREE 10 ML: 5 INJECTION INTRAVENOUS at 09:16

## 2019-06-14 RX ADMIN — LEVOTHYROXINE SODIUM 100 MCG: 100 TABLET ORAL at 04:57

## 2019-06-14 RX ADMIN — SODIUM CHLORIDE, PRESERVATIVE FREE 10 ML: 5 INJECTION INTRAVENOUS at 22:02

## 2019-06-14 RX ADMIN — AZITHROMYCIN MONOHYDRATE 500 MG: 500 INJECTION, POWDER, LYOPHILIZED, FOR SOLUTION INTRAVENOUS at 04:55

## 2019-06-14 RX ADMIN — SODIUM CHLORIDE 250 ML: 9 INJECTION, SOLUTION INTRAVENOUS at 22:00

## 2019-06-14 RX ADMIN — CEFTRIAXONE SODIUM 1 G: 1 INJECTION, POWDER, FOR SOLUTION INTRAMUSCULAR; INTRAVENOUS at 04:03

## 2019-06-14 RX ADMIN — ENOXAPARIN SODIUM 40 MG: 40 INJECTION SUBCUTANEOUS at 09:16

## 2019-06-14 NOTE — PLAN OF CARE
Pt. Awake off and on today. Resistant to taking food or drink. Turned and skin care done. Incontinent of urine . Offered urinal several times. Bed alarm on. Avasys in place for safety. Does yell out at times when helping to adjust oxygen. Vitals stable and breathing easily. Wife called twice for updates. No visitors. Call light in reach.

## 2019-06-14 NOTE — PROGRESS NOTES
Occupational Therapy  Attempted OT session. Pt resting soundly.  OT session deferred this am. Cont OT  Jesenia Mention OTR/L

## 2019-06-14 NOTE — PLAN OF CARE
Problem: Falls - Risk of:  Goal: Will remain free from falls  Description  Will remain free from falls  Outcome: Ongoing  Note:   Pt remains free of falls. Bed locked and in lowest position. Bedside table and call light within reach. Bed alarm on and audible. Nonskid footwear in place. Avasys in place for safety. Will continue to monitor.

## 2019-06-14 NOTE — CARE COORDINATION
[address] Transitions Interview     2019    Patient: Faustino Cueto Patient : 12/15/1931   MRN: [id number]  Reason for Admission: CAP   RARS: Readmission Risk Score: 15         Spoke with: Faustino Cueto        Readmission Risk  Patient Active Problem List   Diagnosis    Cerebral infarction [facility])    Coronary artery disease involving native coronary artery of native heart without angina pectoris    Ataxia    Seizure, petit mal ([address]   [address] [facility] psychosis [facility])    Intraparenchymal hemorrhage of brain ([address]    Right upper quadrant pain    GI bleeding    Otitis externa    Physical deconditioning    Frequent falls    Hypothyroidism    Cerebral artery occlusion with cerebral infarction ([address]    Left-sided weakness    Aphagia    Abdominal aortic aneurysm (AAA) without rupture ([address]    Leukocytosis    Atelectasis    Elevated blood pressure reading    Hypothyroidism due to acquired atrophy of thyroid    Debility    Dyslipidemia    Cerebrovascular accident (CVA) ([address]    Slurred speech    Arterial ischemic stroke, ICA, right, acute ([address]    Remote history of stroke    HTN (hypertension), benign    CAD in native artery    Acute ischemic stroke ([address]    Chronic cor pulmonale (HCC)    Centrilobular emphysema (HCC)    Vomiting    Chronic dementia, with behavioral disturbance    Intracranial hemorrhage ([address]    Community acquired bacterial pneumonia       Inpatient Assessment  Care Transitions Summary    Care Transitions Inpatient Review  Medication Review  Housing Review  Who do you live with?:  Alone  Social Support  Durable Medical Equipment  Functional Review  Hearing and Vision  Care Transitions Interventions     Met with patient to discuss care transition. Role of CTC and care transition program explained to patient. Patient is non-verbal and did not respond to any questions. No family present at bedside.  Per chart review patient is LTC at [facility] and plans to return at discharge. Patient/Responsible Party informed about the [facility] Medicare Initiative . Left BPCI Beneficiary Notification Letter at bedside. Follow Up  No future appointments. Health Maintenance  There are no preventive care reminders to display for this patient.     Dre Kramer RN, BSN, Former[facility] Transitions Coordinator    [phone]

## 2019-06-14 NOTE — PROGRESS NOTES
Hospitalist Progress Note      PCP: Jael Reyna MD    Date of Admission: 6/12/2019    Chief Complaint: Altered mental status/lethargy    Subjective: no new c/o. Medications:  Reviewed    Infusion Medications   Scheduled Medications    sodium chloride flush  10 mL Intravenous 2 times per day    enoxaparin  40 mg Subcutaneous Daily    azithromycin  500 mg Intravenous Q24H    And    cefTRIAXone (ROCEPHIN) IV  1 g Intravenous Q24H    levothyroxine  100 mcg Oral Daily    levETIRAcetam  500 mg Oral BID     PRN Meds: sodium chloride flush, magnesium hydroxide, ondansetron, acetaminophen, ipratropium-albuterol      Intake/Output Summary (Last 24 hours) at 6/14/2019 0828  Last data filed at 6/14/2019 0356  Gross per 24 hour   Intake 590 ml   Output --   Net 590 ml       Physical Exam Performed:    /61   Pulse 76   Temp 98.2 °F (36.8 °C) (Oral)   Resp 16   Ht 6' 0.01\" (1.829 m)   Wt 165 lb 5.5 oz (75 kg)   SpO2 92%   BMI 22.42 kg/m²     General appearance: No apparent distress, appears stated age and cooperative. HEENT: Pupils equal, round, and reactive to light. Conjunctivae/corneas clear. Neck: Supple, with full range of motion. No jugular venous distention. Trachea midline. Respiratory:  Normal respiratory effort. Clear to auscultation, bilaterally without Rales/Wheezes/Rhonchi. Cardiovascular: Regular rate and rhythm with normal S1/S2 without murmurs, rubs or gallops. Abdomen: Soft, non-tender, non-distended with normal bowel sounds. Musculoskeletal: No clubbing, cyanosis or edema bilaterally. Full range of motion without deformity. Skin: Skin color, texture, turgor normal.  No rashes or lesions. Neurologic:  Neurovascularly intact without any focal sensory/motor deficits.  Cranial nerves: II-XII intact, grossly non-focal.  Psychiatric: Alert and oriented, thought content appropriate, normal insight  Capillary Refill: Brisk,< 3 seconds   Peripheral Pulses: +2 palpable, equal bilaterally       Labs:   Recent Labs     06/12/19 2112 06/13/19 0446 06/14/19  0455   WBC 10.6 7.6 5.7   HGB 13.5 13.2* 12.5*   HCT 40.3* 38.8* 37.5*    337 328     Recent Labs     06/12/19 2112 06/13/19 0446 06/14/19  0455    136 137   K 4.2 3.9 4.0    102 102   CO2 25 24 25   BUN 23* 25* 22*   CREATININE 0.9 0.8 0.7*   CALCIUM 9.5 9.4 9.0   PHOS  --   --  3.4     Recent Labs     06/12/19 2112   AST 13*   ALT 15   BILITOT 0.4   ALKPHOS 78     No results for input(s): INR in the last 72 hours. Recent Labs     06/12/19 2112   TROPONINI <0.01       Urinalysis:      Lab Results   Component Value Date    NITRU Negative 06/12/2019    WBCUA 0-2 07/26/2015    BACTERIA 1+ 07/26/2015    RBCUA None seen 07/26/2015    BLOODU Negative 06/12/2019    SPECGRAV 1.010 06/12/2019    GLUCOSEU Negative 06/12/2019       Radiology:  CT CHEST WO CONTRAST   Final Result   Middle lobe and bilateral lower lobe atelectasis versus pneumonia. XR CHEST PORTABLE   Final Result   Reticular and ground-glass opacities have slightly increased since 2017. Pattern may represent progressive interstitial lung disease or mild edema. Assessment/Plan:    Active Hospital Problems    Diagnosis    Community acquired bacterial pneumonia [J15.9]    Chronic dementia, with behavioral disturbance [F03.91]    Aphagia [R13.0]         PNA - Given Maxepime in ED, changed to Rocephin/Azithro - continued.      Dementia - w/out behavioural disturbance. Continue supportive care.      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 Continue 2nd prevention measures.      HTN/CAD - w/ known CAD and no evidence of active signs/sxs of ischemia/failure. Currently controlled on home meds w/ vitals reviewed and documented as above.     HyperLipidemia - controlled on home Statin. Continue, w/ f/u and med adjustment w/ PCP     HypoThyroid - clinically euthyroid on oral replacement therapy. Continue, w/ outpt monitoring as previously arranged.         DVT Prophylaxis: LMWH  Diet: DIET DYSPHAGIA II MECHANICALLY ALTERED; Dysphagia II Mechanically Altered; Nectar Thick  Code Status: Full Code      PT/OT Eval Status: seen w/ recs for SNF     Dispo - likely here several days pending clinical improvement.        Casey Rojas MD

## 2019-06-14 NOTE — PROGRESS NOTES
Speech Language Pathology  Facility/Department: [facility] C3 TELE/MED SURG/ONC  Dysphagia Daily Treatment Note    NAME: Lena Lawson  : 12/15/1931  MRN: [id number]    Patient Diagnosis(es):   Patient Active Problem List    Diagnosis Date Noted    Community acquired bacterial pneumonia 2019    Intracranial hemorrhage ([address] 2019    Chronic dementia, with behavioral disturbance 2018    Vomiting 2018    Centrilobular emphysema ([address]     Acute ischemic stroke ([address]     Chronic cor pulmonale ([address]     Slurred speech     Arterial ischemic stroke, ICA, right, acute ([address]     Remote history of stroke     HTN (hypertension), benign     CAD in native artery     Atelectasis 2017    Elevated blood pressure reading 2017    Hypothyroidism due to acquired atrophy of thyroid 2017    Debility 2017    Dyslipidemia 2017    Cerebrovascular accident (CVA) ([address]     Aphagia 2017    Abdominal aortic aneurysm (AAA) without rupture ([address] 2017    Leukocytosis 2017    Physical deconditioning 2017    Frequent falls 2017    Left-sided weakness 2017    Hypothyroidism     Cerebral artery occlusion with cerebral infarction ([address]     Otitis externa 2015    GI bleeding 2015    Right upper quadrant pain 2015    Intraparenchymal hemorrhage of brain ([address] 2014   [facility] psychosis ([address] 2013    Seizure, petit mal ([address] 2013    Cerebral infarction ([address] 2013    Coronary artery disease involving native coronary artery of native heart without angina pectoris 2013    Ataxia 2013   Subjective:    Pain:    Current Diet:  Dental soft  Nectar thick liquids    Diet Tolerance:  Patient tolerating current diet level with signs/symptoms of penetration / aspiration. P.O. Trials:   Thin       Nectar   x ~ 2 oz of apple juice   Honey   x ~ 2 oz of apple juice   Puree TBD    If pt discharges from hospital prior to Speech/Swallowing discharge, this note serves as tx and discharge summary.      Total Treatment Time:  9019-3655  17 minutes  dysphagia treatment    Signature:  Claudia Galeas M.S. [id number] [facility]  Speech-language pathologist  [id number]    Jeffery Wells, Student SLP

## 2019-06-15 LAB
ALBUMIN SERPL-MCNC: 2.8 G/DL (ref 3.4–5)
ANION GAP SERPL CALCULATED.3IONS-SCNC: 10 MMOL/L (ref 3–16)
BUN BLDV-MCNC: 15 MG/DL (ref 7–20)
CALCIUM SERPL-MCNC: 9.1 MG/DL (ref 8.3–10.6)
CHLORIDE BLD-SCNC: 102 MMOL/L (ref 99–110)
CO2: 25 MMOL/L (ref 21–32)
CREAT SERPL-MCNC: 0.7 MG/DL (ref 0.8–1.3)
GFR AFRICAN AMERICAN: >60
GFR NON-AFRICAN AMERICAN: >60
GLUCOSE BLD-MCNC: 96 MG/DL (ref 70–99)
HCT VFR BLD CALC: 39.8 % (ref 40.5–52.5)
HEMOGLOBIN: 13.2 G/DL (ref 13.5–17.5)
MCH RBC QN AUTO: 31.7 PG (ref 26–34)
MCHC RBC AUTO-ENTMCNC: 33.1 G/DL (ref 31–36)
MCV RBC AUTO: 95.6 FL (ref 80–100)
PDW BLD-RTO: 12.9 % (ref 12.4–15.4)
PHOSPHORUS: 2.9 MG/DL (ref 2.5–4.9)
PLATELET # BLD: 353 K/UL (ref 135–450)
PMV BLD AUTO: 7.2 FL (ref 5–10.5)
POTASSIUM SERPL-SCNC: 4.1 MMOL/L (ref 3.5–5.1)
RBC # BLD: 4.16 M/UL (ref 4.2–5.9)
SODIUM BLD-SCNC: 137 MMOL/L (ref 136–145)
WBC # BLD: 5.9 K/UL (ref 4–11)

## 2019-06-15 PROCEDURE — 6370000000 HC RX 637 (ALT 250 FOR IP): Performed by: INTERNAL MEDICINE

## 2019-06-15 PROCEDURE — 2580000003 HC RX 258: Performed by: NURSE PRACTITIONER

## 2019-06-15 PROCEDURE — 1200000000 HC SEMI PRIVATE

## 2019-06-15 PROCEDURE — 80069 RENAL FUNCTION PANEL: CPT

## 2019-06-15 PROCEDURE — 85027 COMPLETE CBC AUTOMATED: CPT

## 2019-06-15 PROCEDURE — 36415 COLL VENOUS BLD VENIPUNCTURE: CPT

## 2019-06-15 PROCEDURE — 92526 ORAL FUNCTION THERAPY: CPT

## 2019-06-15 PROCEDURE — 6360000002 HC RX W HCPCS: Performed by: NURSE PRACTITIONER

## 2019-06-15 RX ADMIN — CEFTRIAXONE SODIUM 1 G: 1 INJECTION, POWDER, FOR SOLUTION INTRAMUSCULAR; INTRAVENOUS at 05:26

## 2019-06-15 RX ADMIN — LEVETIRACETAM 500 MG: 500 TABLET ORAL at 21:14

## 2019-06-15 RX ADMIN — ENOXAPARIN SODIUM 40 MG: 40 INJECTION SUBCUTANEOUS at 09:07

## 2019-06-15 RX ADMIN — AZITHROMYCIN MONOHYDRATE 500 MG: 500 INJECTION, POWDER, LYOPHILIZED, FOR SOLUTION INTRAVENOUS at 06:21

## 2019-06-15 RX ADMIN — LEVETIRACETAM 500 MG: 500 TABLET ORAL at 09:08

## 2019-06-15 RX ADMIN — LEVOTHYROXINE SODIUM 100 MCG: 100 TABLET ORAL at 06:21

## 2019-06-15 NOTE — PLAN OF CARE
Pt removed his oxygen. When this writer checked his SPO2 levels, they were 90-94% on room air. O2 left off for now.

## 2019-06-15 NOTE — PROGRESS NOTES
bilaterally       Labs:   Recent Labs     06/13/19  0446 06/14/19  0455 06/15/19  0550   WBC 7.6 5.7 5.9   HGB 13.2* 12.5* 13.2*   HCT 38.8* 37.5* 39.8*    328 353     Recent Labs     06/13/19  0446 06/14/19  0455 06/15/19  0550    137 137   K 3.9 4.0 4.1    102 102   CO2 24 25 25   BUN 25* 22* 15   CREATININE 0.8 0.7* 0.7*   CALCIUM 9.4 9.0 9.1   PHOS  --  3.4 2.9     Recent Labs     06/12/19 2112   AST 13*   ALT 15   BILITOT 0.4   ALKPHOS 78     No results for input(s): INR in the last 72 hours. Recent Labs     06/12/19 2112   TROPONINI <0.01       Urinalysis:      Lab Results   Component Value Date    NITRU Negative 06/12/2019    WBCUA 0-2 07/26/2015    BACTERIA 1+ 07/26/2015    RBCUA None seen 07/26/2015    BLOODU Negative 06/12/2019    SPECGRAV 1.010 06/12/2019    GLUCOSEU Negative 06/12/2019       Radiology:  CT CHEST WO CONTRAST   Final Result   Middle lobe and bilateral lower lobe atelectasis versus pneumonia. XR CHEST PORTABLE   Final Result   Reticular and ground-glass opacities have slightly increased since 2017. Pattern may represent progressive interstitial lung disease or mild edema. Assessment/Plan:    Active Hospital Problems    Diagnosis    Community acquired bacterial pneumonia [J15.9]    Chronic dementia, with behavioral disturbance [F03.91]    Aphagia [R13.0]         PNA - Given Maxepime in ED, changed to Rocephin/Azithro - continued.      Dementia - w/out behavioural disturbance. Continue supportive care.      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 Continue 2nd prevention measures.      HTN/CAD - w/ known CAD and no evidence of active signs/sxs of ischemia/failure. Currently controlled on home meds w/ vitals reviewed and documented as above.     HyperLipidemia - controlled on home Statin. Continue, w/ f/u and med adjustment w/ PCP     HypoThyroid - clinically euthyroid on oral replacement therapy. Continue, w/ outpt monitoring as previously arranged.         DVT Prophylaxis: LMWH  Diet: DIET DYSPHAGIA I PUREED; Honey Thick  Code Status: Full Code      PT/OT Eval Status: seen w/ recs for SNF     Dispo - likely here over weekend pending clinical improvement.        Susan Buck MD

## 2019-06-15 NOTE — PROGRESS NOTES
Speech Language Pathology  Facility/Department: [facility] C3 TELE/MED SURG/ONC  Dysphagia Daily Treatment Note     NAME: Mika Taylor  : 12/15/1931  MRN: [id number]     Patient Diagnosis(es):        Patient Active Problem List     Diagnosis Date Noted    Community acquired bacterial pneumonia 2019    Intracranial hemorrhage ([address] 2019    Chronic dementia, with behavioral disturbance 2018    Vomiting 2018    Centrilobular emphysema ([address]      Acute ischemic stroke ([address]      Chronic cor pulmonale ([address]      Slurred speech      Arterial ischemic stroke, ICA, right, acute ([address]      Remote history of stroke      HTN (hypertension), benign      CAD in native artery      Atelectasis 2017    Elevated blood pressure reading 2017    Hypothyroidism due to acquired atrophy of thyroid 2017    Debility 2017    Dyslipidemia 2017    Cerebrovascular accident (CVA) ([address]      Aphagia 2017    Abdominal aortic aneurysm (AAA) without rupture ([address] 2017    Leukocytosis 2017    Physical deconditioning 2017    Frequent falls 2017    Left-sided weakness 2017    Hypothyroidism      Cerebral artery occlusion with cerebral infarction ([address]      Otitis externa 2015    GI bleeding 2015    Right upper quadrant pain 2015    Intraparenchymal hemorrhage of brain ([address] 2014   [facility] psychosis ([address] 2013    Seizure, petit mal ([address] 2013    Cerebral infarction ([address] 2013    Coronary artery disease involving native coronary artery of native heart without angina pectoris 2013    Ataxia 2013   Subjective:     Pain:did not appear in pain, no verbalizations today with max cues     Current Diet:  Dysphagia 1/puree   Honey thick liquids     Diet Tolerance:  Patient tolerating current diet level with signs/symptoms of penetration / aspiration.     P.O.  Trials:see without s/s of aspiration. 06/15; Rec HTL and dysphagia 1 (puree)     Patient/Family/Caregiver Education:  Pt educated on role of ST and diet downgrade recommendations.      Compensatory Strategies:  Small bites/sips  Upright as possible for all oral intake  Remain upright for 30-45 minutes after meals  Eat/feed slowly  Assist feed  External pacing     Plan:    Continued Dysphagia treatment with goals per plan of care.     Discharge Recommendations: If patient discharges recommend ST @ discharge     If pt discharges from hospital prior to Speech/Swallowing discharge, this note serves as tx and discharge summary.      Charges/Time  Time in: 1326  Time out: 1340 (14 mintutes  Charges: dysphagia treatment x1    Patient was left partially reclined in bed, with call light in reach, all need met. Safety handoff completed with Rn/Emma, who verbalized understanding       Signature:   Miracle Bates MS CCC-SLP  [phone] .[id number]  Speech Language Pathologist

## 2019-06-15 NOTE — PLAN OF CARE
4 Eyes Skin Assessment     The patient is being assess for  Shift Handoff    I agree that 2 RN's have performed a thorough Head to Toe Skin Assessment on the patient. ALL assessment sites listed below have been assessed. Areas assessed by both nurses: Miranda Palmer, RN and Srikanth Mtz RN  ? Head, Face, and Ears   ? Shoulders, Back, and Chest  ?   Arms, Elbows, and Hands   ? Coccyx, Sacrum, and Ischum  ? Legs, Feet, and Heels        Does the Patient have Skin Breakdown?   Yes a wound was noted on the Admission Assessment and an WOUND LDA was Initiated documentation include the Cris-wound, Wound Assessment, Measurements, Dressing Treatment, Drainage, and Color\",         Randal Prevention initiated:  Yes   Wound Care Orders initiated:  Yes      [address]  nurse consulted for Pressure Injury (Stage 3,4, Unstageable, DTI, NWPT, and Complex wounds):  NA      Nurse 1 eSignature: Electronically signed by Jenn Baldwin RN on 6/14/19 at 10:10 PM    **SHARE this note so that the co-signing nurse is able to place an eSignature**    Nurse 2 eSignature: Electronically signed by Mary Miller RN on 6/14/19 at 10:33 PM

## 2019-06-15 NOTE — PROGRESS NOTES
Pt is alert but unable to assess orientation d/t pt's history of expressive aphasia. Pt will only respond by nodding his head or grunting. Vitals signs are stable. Assessment is as charted. Pt denies further needs at this time. Will continue to monitor.

## 2019-06-15 NOTE — PROGRESS NOTES
Shift assessment updated as charted. VSS. Patient on room air. Cris care given, pt changed and repositioned. AVASYS remain in use. Call light in reach. Will monitor.

## 2019-06-16 LAB
ALBUMIN SERPL-MCNC: 2.9 G/DL (ref 3.4–5)
ANION GAP SERPL CALCULATED.3IONS-SCNC: 11 MMOL/L (ref 3–16)
BLOOD CULTURE, ROUTINE: ABNORMAL
BUN BLDV-MCNC: 11 MG/DL (ref 7–20)
CALCIUM SERPL-MCNC: 9.3 MG/DL (ref 8.3–10.6)
CHLORIDE BLD-SCNC: 100 MMOL/L (ref 99–110)
CO2: 22 MMOL/L (ref 21–32)
CREAT SERPL-MCNC: 0.8 MG/DL (ref 0.8–1.3)
GFR AFRICAN AMERICAN: >60
GFR NON-AFRICAN AMERICAN: >60
GLUCOSE BLD-MCNC: 124 MG/DL (ref 70–99)
HCT VFR BLD CALC: 41 % (ref 40.5–52.5)
HEMOGLOBIN: 13.8 G/DL (ref 13.5–17.5)
MCH RBC QN AUTO: 32 PG (ref 26–34)
MCHC RBC AUTO-ENTMCNC: 33.7 G/DL (ref 31–36)
MCV RBC AUTO: 95.1 FL (ref 80–100)
ORGANISM: ABNORMAL
ORGANISM: ABNORMAL
PDW BLD-RTO: 12.8 % (ref 12.4–15.4)
PHOSPHORUS: 2.9 MG/DL (ref 2.5–4.9)
PLATELET # BLD: 392 K/UL (ref 135–450)
PMV BLD AUTO: 7 FL (ref 5–10.5)
POTASSIUM SERPL-SCNC: 4.2 MMOL/L (ref 3.5–5.1)
RBC # BLD: 4.31 M/UL (ref 4.2–5.9)
SODIUM BLD-SCNC: 133 MMOL/L (ref 136–145)
WBC # BLD: 7.9 K/UL (ref 4–11)

## 2019-06-16 PROCEDURE — 6360000002 HC RX W HCPCS: Performed by: NURSE PRACTITIONER

## 2019-06-16 PROCEDURE — 36415 COLL VENOUS BLD VENIPUNCTURE: CPT

## 2019-06-16 PROCEDURE — 2580000003 HC RX 258

## 2019-06-16 PROCEDURE — 80069 RENAL FUNCTION PANEL: CPT

## 2019-06-16 PROCEDURE — 85027 COMPLETE CBC AUTOMATED: CPT

## 2019-06-16 PROCEDURE — 6370000000 HC RX 637 (ALT 250 FOR IP): Performed by: INTERNAL MEDICINE

## 2019-06-16 PROCEDURE — 1200000000 HC SEMI PRIVATE

## 2019-06-16 PROCEDURE — 2580000003 HC RX 258: Performed by: NURSE PRACTITIONER

## 2019-06-16 RX ORDER — SODIUM CHLORIDE 9 MG/ML
INJECTION, SOLUTION INTRAVENOUS
Status: COMPLETED
Start: 2019-06-16 | End: 2019-06-16

## 2019-06-16 RX ADMIN — AZITHROMYCIN MONOHYDRATE 500 MG: 500 INJECTION, POWDER, LYOPHILIZED, FOR SOLUTION INTRAVENOUS at 05:13

## 2019-06-16 RX ADMIN — LEVOTHYROXINE SODIUM 100 MCG: 100 TABLET ORAL at 06:53

## 2019-06-16 RX ADMIN — LEVETIRACETAM 500 MG: 500 TABLET ORAL at 08:35

## 2019-06-16 RX ADMIN — SODIUM CHLORIDE 250 ML: 9 INJECTION, SOLUTION INTRAVENOUS at 00:50

## 2019-06-16 RX ADMIN — ENOXAPARIN SODIUM 40 MG: 40 INJECTION SUBCUTANEOUS at 08:35

## 2019-06-16 RX ADMIN — LEVETIRACETAM 500 MG: 500 TABLET ORAL at 19:49

## 2019-06-16 RX ADMIN — CEFTRIAXONE SODIUM 1 G: 1 INJECTION, POWDER, FOR SOLUTION INTRAMUSCULAR; INTRAVENOUS at 04:39

## 2019-06-16 NOTE — PLAN OF CARE
4 Eyes Skin Assessment     The patient is being assess for  Shift Handoff    I agree that 2 RN's have performed a thorough Head to Toe Skin Assessment on the patient. ALL assessment sites listed below have been assessed. Areas assessed by both nurses: Jimbo Knutson RN and Lily Haynes RN  ? Head, Face, and Ears   ? Shoulders, Back, and Chest  ?   Arms, Elbows, and Hands   ? Coccyx, Sacrum, and Ischum  ? Legs, Feet, and Heels        Does the Patient have Skin Breakdown?   Yes a wound was noted on the Admission Assessment and an WOUND LDA was Initiated documentation include the Cris-wound, Wound Assessment, Measurements, Dressing Treatment, Drainage, and Color\",         Randal Prevention initiated:  Yes   Wound Care Orders initiated:  Yes      [address]  nurse consulted for Pressure Injury (Stage 3,4, Unstageable, DTI, NWPT, and Complex wounds):  NA      Nurse 1 eSignature: Electronically signed by Jana Molina RN on 6/16/19 at 12:35 AM    **SHARE this note so that the co-signing nurse is able to place an eSignature**    Nurse 2 eSignature: Electronically signed by Nicolás Shukla RN on 6/16/19 at 1:46 AM

## 2019-06-16 NOTE — PROGRESS NOTES
RESPIRATORY THERAPY ASSESSMENT    Name:  Candice Girard Record Number:  [id number]  Age: 80 y.o. Gender: male  : 12/15/1931  Today's Date:  2019  [facility]  [facility]    Assessment     Is the patient being admitted for a COPD or Asthma exacerbation? No   (If yes the patient will be seen every 4 hours for the first 24 hours and then reassessed)    Patient Admission Diagnosis      Allergies  No Known Allergies    Minimum Predicted Vital Capacity:     1034          Actual Vital Capacity:      Unable to do              Pulmonary History:No history  Home Oxygen Therapy:  room air  Home Respiratory Therapy:None   Current Respiratory Therapy:  Duoneb HHN Q4 PRN           Respiratory Severity Index(RSI)   Patients with orders for inhalation medications, oxygen, or any therapeutic treatment modality will be placed on Respiratory Protocol. They will be assessed with the first treatment and at least every 72 hours thereafter. The following severity scale will be used to determine frequency of treatment intervention.     Smoking History: Pulmonary Disease or Smoking History, Greater than 15 pack year = 2    Social History  Social History     Tobacco Use    Smoking status: Former Smoker     Packs/day: 0.50     Years: 60.00     Pack years: 30.00     Last attempt to quit: 2013     Years since quittin.6    Smokeless tobacco: Never Used   Substance Use Topics    Alcohol use: No    Drug use: No       Recent Surgical History: None = 0  Past Surgical History  Past Surgical History:   Procedure Laterality Date    APPENDECTOMY      CARDIAC SURGERY      stents     CAROTID ENDARTERECTOMY Left 2013    CHOLECYSTECTOMY, LAPAROSCOPIC  1-22-15    CORONARY ARTERY BYPASS GRAFT  2009    FRACTURE SURGERY         Level of Consciousness: Alert, Follows Commands but Disoriented = 1    Level of Activity: Mostly sedentary, minimal walking = 2    Respiratory Pattern: Regular Pattern; RR 8-20 = 0    Breath Sounds: Diminshed bilaterally and/or crackles = 2    Sputum   ,  ,    Cough: Strong, spontaneous, non-productive = 0    Vital Signs   /69   Pulse 77   Temp 98.7 °F (37.1 °C) (Oral)   Resp 16   Ht 6' 0.01\" (1.829 m)   Wt 167 lb 8.8 oz (76 kg)   SpO2 93%   BMI 22.72 kg/m²   SPO2 (COPD values may differ): Greater than or equal to 92% on room air = 0    Peak Flow (asthma only): not applicable = 0    RSI: 5-6 = Q4hr PRN (every four hours as needed) for dyspnea        Plan       Goals: medication delivery, mobilize retained secretions, volume expansion and improve oxygenation    Patient/caregiver was educated on the proper method of use for Respiratory Care Devices:  Yes      Level of patient/caregiver understanding able to:   ? Verbalize understanding   ? Demonstrate understanding       ? Teach back        ? Needs reinforcement       ? No available caregiver               ? Other:     Response to education:  pt was unable to do IS     Is patient being placed on Home Treatment Regimen? No     Does the patient have everything they need prior to discharge? NA     Comments: pt charting reviewed for re eval     Plan of Care: Duoneb HHN Q4 PRN     Electronically signed by Anoop Daily RCP on 6/16/2019 at 1:28 AM    Respiratory Protocol Guidelines     1. Assessment and treatment by Respiratory Therapy will be initiated for medication and therapeutic interventions upon initiation of aerosolized medication. 2. Physician will be contacted for respiratory rate (RR) greater than 35 breaths per minute. Therapy will be held for heart rate (HR) greater than 140 beats per minute, pending direction from physician. 3. Bronchodilators will be administered via Metered Dose Inhaler (MDI) with spacer when the following criteria are met:  a. Alert and cooperative     b. HR < 140 bpm  c. RR < 30 bpm                d. Can demonstrate a 2-3 second inspiratory hold  4.  Bronchodilators will be administered via Hand Held Nebulizer MATILDE [facility]) to patients when ANY of the following criteria are met  a. Incognizant or uncooperative          b. Patients treated with HHN at Home        c. Unable to demonstrate proper use of MDI with spacer     d. RR > 30 bpm   5. Bronchodilators will be delivered via Metered Dose Inhaler (MDI), HHN, Aerogen to intubated patients on mechanical ventilation. 6. Inhalation medication orders will be delivered and/or substituted as outlined below. Aerosolized Medications Ordering and Administration Guidelines:    1. All Medications will be ordered by a physician, and their frequency and/or modality will be adjusted as defined by the patients Respiratory Severity Index (RSI) score. 2. If the patient does not have documented COPD, consider discontinuing anticholinergics when RSI is less than 9.  3. If the bronchospasm worsens (increased RSI), then the bronchodilator frequency can be increased to a maximum of every 4 hours. If greater than every 4 hours is required, the physician will be contacted. 4. If the bronchospasm improves, the frequency of the bronchodilator can be decreased, based on the patient's RSI, but not less than home treatment regimen frequency. 5. Bronchodilator(s) will be discontinued if patient has a RSI less than 9 and has received no scheduled or as needed treatment for 72  Hrs. Patients Ordered on a Mucolytic Agent:    1. Must always be administered with a bronchodilator. 2. Discontinue if patient experiences worsened bronchospasm, or secretions have lessened to the point that the patient is able to clear them with a cough. Anti-inflammatory and Combination Medications:    1. If the patient lacks prior history of lung disease, is not using inhaled anti-inflammatory medication at home, and lacks wheezing by examination or by history for at least 24 hours, contact physician for possible discontinuation.

## 2019-06-16 NOTE — PROGRESS NOTES
Pt is alert but unable to assess orientation d/t pt's history of expressive aphasia. Vitals signs are stable. Assessment is as charted. Lungs are clear but diminished. Pt denies further needs at this time. Will continue to monitor.

## 2019-06-16 NOTE — PROGRESS NOTES
Hospitalist Progress Note      PCP: Julianne Toth MD    Date of Admission: 6/12/2019    Chief Complaint: Altered mental status/lethargy    Subjective: no new c/o. Medications:  Reviewed    Infusion Medications   Scheduled Medications    sodium chloride flush  10 mL Intravenous 2 times per day    enoxaparin  40 mg Subcutaneous Daily    azithromycin  500 mg Intravenous Q24H    And    cefTRIAXone (ROCEPHIN) IV  1 g Intravenous Q24H    levothyroxine  100 mcg Oral Daily    levETIRAcetam  500 mg Oral BID     PRN Meds: sodium chloride flush, magnesium hydroxide, ondansetron, acetaminophen, ipratropium-albuterol      Intake/Output Summary (Last 24 hours) at 6/16/2019 0814  Last data filed at 6/15/2019 2126  Gross per 24 hour   Intake 1618 ml   Output --   Net 1618 ml       Physical Exam Performed:    /69   Pulse 77   Temp 98.7 °F (37.1 °C) (Oral)   Resp 16   Ht 6' 0.01\" (1.829 m)   Wt 166 lb 3.6 oz (75.4 kg)   SpO2 93%   BMI 22.54 kg/m²     General appearance: No apparent distress, appears stated age and more cooperative. HEENT: Pupils equal, round, and reactive to light. Conjunctivae/corneas clear. Neck: Supple, with full range of motion. No jugular venous distention. Trachea midline. Respiratory:  Normal respiratory effort. Clear to auscultation, bilaterally without Rales/Wheezes/Rhonchi. Cardiovascular: Regular rate and rhythm with normal S1/S2 without murmurs, rubs or gallops. Abdomen: Soft, non-tender, non-distended with normal bowel sounds. Musculoskeletal: No clubbing, cyanosis or edema bilaterally. Full range of motion without deformity. Skin: Skin color, texture, turgor normal.  No rashes or lesions. Neurologic:  Neurovascularly intact without any focal sensory/motor deficits.  Cranial nerves: II-XII intact, grossly non-focal.  Psychiatric: Alert but w/out insight  Capillary Refill: Brisk,< 3 seconds   Peripheral Pulses: +2 palpable, equal bilaterally       Labs:   Recent Labs     06/14/19  0455 06/15/19  0550 06/16/19  0605   WBC 5.7 5.9 7.9   HGB 12.5* 13.2* 13.8   HCT 37.5* 39.8* 41.0    353 392     Recent Labs     06/14/19  0455 06/15/19  0550 06/16/19  0605    137 133*   K 4.0 4.1 4.2    102 100   CO2 25 25 22   BUN 22* 15 11   CREATININE 0.7* 0.7* 0.8   CALCIUM 9.0 9.1 9.3   PHOS 3.4 2.9 2.9     No results for input(s): AST, ALT, BILIDIR, BILITOT, ALKPHOS in the last 72 hours. No results for input(s): INR in the last 72 hours. No results for input(s): Ellender Yadav in the last 72 hours. Urinalysis:      Lab Results   Component Value Date    NITRU Negative 06/12/2019    WBCUA 0-2 07/26/2015    BACTERIA 1+ 07/26/2015    RBCUA None seen 07/26/2015    BLOODU Negative 06/12/2019    SPECGRAV 1.010 06/12/2019    GLUCOSEU Negative 06/12/2019       Radiology:  CT CHEST WO CONTRAST   Final Result   Middle lobe and bilateral lower lobe atelectasis versus pneumonia. XR CHEST PORTABLE   Final Result   Reticular and ground-glass opacities have slightly increased since 2017. Pattern may represent progressive interstitial lung disease or mild edema. Assessment/Plan:    Active Hospital Problems    Diagnosis    Community acquired bacterial pneumonia [J15.9]    Chronic dementia, with behavioral disturbance [F03.91]    Aphagia [R13.0]         PNA - Given Maxepime in ED, changed to Rocephin/Azithro - continued.      Dementia - w/out behavioural disturbance. Continue supportive care.      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 Continue 2nd prevention measures.      HTN/CAD - w/ known CAD and no evidence of active signs/sxs of ischemia/failure. Currently controlled on home meds w/ vitals reviewed and documented as above.     HyperLipidemia - controlled on home Statin. Continue, w/ f/u and med adjustment w/ PCP     HypoThyroid - clinically euthyroid on oral replacement therapy.  Continue, w/ outpt monitoring as previously arranged.         DVT Prophylaxis: LMWH  Diet: DIET DYSPHAGIA I PUREED; Honey Thick  Code Status: Full Code      PT/OT Eval Status: seen w/ recs for SNF     Dispo - likely here over weekend pending clinical improvement.  Discharge back to SNF Monday/Tues 17/18 June       Lola Sage MD

## 2019-06-16 NOTE — PLAN OF CARE
High risk for further skin breakdown, patient turned and repositioned every two hours, relieving pressures on bony prominences, skin kept clean and dry.

## 2019-06-16 NOTE — PROGRESS NOTES
Pt alert but unable to assess orientation as pt is nonverbal other than saying yes and no when asked questions. VSS. Shift assessment complete and charted. PCA gave pt a bath and changed and turned him. Pt resting in bed and is pleasant. Will continue to monitor.

## 2019-06-17 VITALS
SYSTOLIC BLOOD PRESSURE: 122 MMHG | OXYGEN SATURATION: 91 % | TEMPERATURE: 98.4 F | HEIGHT: 72 IN | RESPIRATION RATE: 16 BRPM | HEART RATE: 72 BPM | BODY MASS INDEX: 22.22 KG/M2 | DIASTOLIC BLOOD PRESSURE: 68 MMHG | WEIGHT: 164.02 LBS

## 2019-06-17 LAB
ALBUMIN SERPL-MCNC: 3 G/DL (ref 3.4–5)
ANION GAP SERPL CALCULATED.3IONS-SCNC: 11 MMOL/L (ref 3–16)
BUN BLDV-MCNC: 12 MG/DL (ref 7–20)
CALCIUM SERPL-MCNC: 9 MG/DL (ref 8.3–10.6)
CHLORIDE BLD-SCNC: 96 MMOL/L (ref 99–110)
CO2: 24 MMOL/L (ref 21–32)
CREAT SERPL-MCNC: 0.8 MG/DL (ref 0.8–1.3)
GFR AFRICAN AMERICAN: >60
GFR NON-AFRICAN AMERICAN: >60
GLUCOSE BLD-MCNC: 246 MG/DL (ref 70–99)
HCT VFR BLD CALC: 36.1 % (ref 40.5–52.5)
HEMOGLOBIN: 12 G/DL (ref 13.5–17.5)
MCH RBC QN AUTO: 32.6 PG (ref 26–34)
MCHC RBC AUTO-ENTMCNC: 33.3 G/DL (ref 31–36)
MCV RBC AUTO: 98 FL (ref 80–100)
PDW BLD-RTO: 12.6 % (ref 12.4–15.4)
PHOSPHORUS: 3.1 MG/DL (ref 2.5–4.9)
PLATELET # BLD: 348 K/UL (ref 135–450)
PMV BLD AUTO: 6.6 FL (ref 5–10.5)
POTASSIUM SERPL-SCNC: 4 MMOL/L (ref 3.5–5.1)
RBC # BLD: 3.69 M/UL (ref 4.2–5.9)
SODIUM BLD-SCNC: 131 MMOL/L (ref 136–145)
WBC # BLD: 6.1 K/UL (ref 4–11)

## 2019-06-17 PROCEDURE — 6360000002 HC RX W HCPCS: Performed by: NURSE PRACTITIONER

## 2019-06-17 PROCEDURE — 6370000000 HC RX 637 (ALT 250 FOR IP): Performed by: INTERNAL MEDICINE

## 2019-06-17 PROCEDURE — 36415 COLL VENOUS BLD VENIPUNCTURE: CPT

## 2019-06-17 PROCEDURE — 80069 RENAL FUNCTION PANEL: CPT

## 2019-06-17 PROCEDURE — 2580000003 HC RX 258: Performed by: NURSE PRACTITIONER

## 2019-06-17 PROCEDURE — 85027 COMPLETE CBC AUTOMATED: CPT

## 2019-06-17 RX ORDER — SODIUM CHLORIDE 9 MG/ML
INJECTION, SOLUTION INTRAVENOUS
Status: DISCONTINUED
Start: 2019-06-17 | End: 2019-06-17 | Stop reason: HOSPADM

## 2019-06-17 RX ADMIN — CEFTRIAXONE SODIUM 1 G: 1 INJECTION, POWDER, FOR SOLUTION INTRAMUSCULAR; INTRAVENOUS at 04:01

## 2019-06-17 RX ADMIN — LEVETIRACETAM 500 MG: 500 TABLET ORAL at 08:49

## 2019-06-17 RX ADMIN — ENOXAPARIN SODIUM 40 MG: 40 INJECTION SUBCUTANEOUS at 08:49

## 2019-06-17 RX ADMIN — AZITHROMYCIN MONOHYDRATE 500 MG: 500 INJECTION, POWDER, LYOPHILIZED, FOR SOLUTION INTRAVENOUS at 04:40

## 2019-06-17 RX ADMIN — LEVOTHYROXINE SODIUM 100 MCG: 100 TABLET ORAL at 06:28

## 2019-06-17 RX ADMIN — SODIUM CHLORIDE, PRESERVATIVE FREE 10 ML: 5 INJECTION INTRAVENOUS at 08:49

## 2019-06-17 NOTE — DISCHARGE INSTR - COC
Continuity of Care Form    Patient Name: Magalys Burk   :  12/15/1931  MRN:  [id number]    Admit date:  2019  Discharge date:  2019    Code Status Order: Full Code   Advance Directives:   [facility] Documentation     Date/Time Healthcare Directive Type of Healthcare Directive Copy in [address] Agent's Name Healthcare Agent's Phone Number    [phone]  Yes, patient has an advance directive for healthcare treatment  Living will  --  --  --  --          Admitting Physician:  Sheryl Quintanilla MD  PCP: Jael Reyna MD    Discharging Nurse: [facility] Unit/Room#: 0305/6912-08  Discharging Unit Phone Number: [phone]    Emergency Contact:   Extended Emergency Contact Information  Primary Emergency Contact: Nicolette Baldwin  Address:  [address], [address] [place] [address],[address] Phone: [phone]  Mobile Phone: [phone]  Relation: Spouse  Secondary Emergency Contact: Chayo Quiroga [address] Phone: [phone]  Mobile Phone: [phone]  Relation: Child    Past Surgical History:  Past Surgical History:   Procedure Laterality Date    APPENDECTOMY      CARDIAC SURGERY      stents     CAROTID ENDARTERECTOMY Left 2013    CHOLECYSTECTOMY, LAPAROSCOPIC  1-22-15    CORONARY ARTERY BYPASS GRAFT  2009    FRACTURE SURGERY         Immunization History:   Immunization History   Administered Date(s) Administered    Influenza, MDCK Quadv, IM, PF (Flucelvax 4 yrs and older) 2017    Pneumococcal 13-valent Conjugate Tigist Quarto) 2017       Active Problems:  Patient Active Problem List   Diagnosis Code    Cerebral infarction ([facility] [address] I63.9    Coronary artery disease involving native coronary artery of native heart without angina pectoris I25.10    Ataxia R27.0    Seizure, petit mal ([facility] [address] G40. [address] [facility] Intraparenchymal hemorrhage of brain (HCC) I61.9    Right upper quadrant pain R10.11    GI bleeding K92.2    Otitis externa H60.90    Physical deconditioning R53.81    Frequent falls R29.6    Hypothyroidism E03.9    Cerebral artery occlusion with cerebral infarction (HCC) I63.50    Left-sided weakness R53.1    Aphagia R13.0    Abdominal aortic aneurysm (AAA) without rupture (HCC) I71.4    Leukocytosis D72.829    Atelectasis J98.11    Elevated blood pressure reading R03.0    Hypothyroidism due to acquired atrophy of thyroid E03.4    Debility R53.81    Dyslipidemia E78.5    Cerebrovascular accident (CVA) ([address] I63.9    Slurred speech R47.81    Arterial ischemic stroke, ICA, right, acute (HCC) I63.231    Remote history of stroke Z86.73    HTN (hypertension), benign I10    CAD in native artery I25.10    Acute ischemic stroke (HCC) I63.9    Chronic cor pulmonale (HCC) I27.81    Centrilobular emphysema (HCC) J43.2    Vomiting R11.10    Chronic dementia, with behavioral disturbance F03.91    Intracranial hemorrhage (HCC) I62.9    Community acquired bacterial pneumonia J15.9       Isolation/Infection:   Isolation          No Isolation            Nurse Assessment:  Last Vital Signs: /68   Pulse 72   Temp 98.4 °F (36.9 °C) (Oral)   Resp 16   Ht 6' 0.01\" (1.829 m)   Wt 164 lb 0.4 oz (74.4 kg)   SpO2 91%   BMI 22.24 kg/m²     Last documented pain score (0-10 scale): Pain Level: 0(unable to rate; pain wants to urinate)  Last Weight:   Wt Readings from Last 1 Encounters:   06/17/19 164 lb 0.4 oz (74.4 kg)     Mental Status:  disoriented    IV Access:  - None    Nursing Mobility/ADLs:  Walking   Dependent  Transfer  Dependent  Bathing  Dependent  Dressing  Dependent  Toileting  Dependent  Feeding  Dependent  Med Admin  Dependent  Med Delivery   whole    Wound Care Documentation and Therapy:  Wound 06/13/19 Coccyx Right Stage I pressure ulcer (possibly healing ulcer) (Active)   Wound Pressure Stage  2 6/16/2019 11:04 AM   Dressing Status Clean;Dry; Intact 6/16/2019  7:54 PM   Dressing Changed Changed/New 6/14/2019  8:00 AM   Dressing/Treatment Foam 6/16/2019  7:54 PM   Wound Length (cm) 0.3 cm 6/13/2019  4:10 AM   Wound Width (cm) 0.4 cm 6/13/2019  4:10 AM   Wound Surface Area (cm^2) 0.12 cm^2 6/13/2019  4:10 AM   Wound Assessment Red 6/16/2019  7:54 PM   Drainage Amount None 6/16/2019  7:54 PM   Red%Wound Bed 100 6/13/2019  8:00 AM   Number of days: 4        Elimination:  Continence:   · Bowel: No  · Bladder: No  Urinary Catheter: None   Colostomy/Ileostomy/Ileal Conduit: No       Date of Last BM 6-17-19    Intake/Output Summary (Last 24 hours) at 6/17/2019 1056  Last data filed at 6/17/2019 0831  Gross per 24 hour   Intake 990 ml   Output --   Net 990 ml     I/O last 3 completed shifts: In: 0984 [P.O.:240; I.V.:871]  Out: -     Safety Concerns: At Risk for Falls    Impairments/Disabilities:      Hearing    Nutrition Therapy:  Current Nutrition Therapy:   - Oral Diet:  Dysphagia 1 pureed    Routes of Feeding: Oral  Liquids: Honey Thick Liquids  Daily Fluid Restriction: no  Last Modified Barium Swallow with Video (Video Swallowing Test): not done    Treatments at the Time of Hospital Discharge:   Respiratory Treatments: ***  Oxygen Therapy:  is not on home oxygen therapy.   Ventilator:    - No ventilator support    Rehab Therapies: Physical Therapy  Weight Bearing Status/Restrictions: No weight bearing restirctions  Other Medical Equipment (for information only, NOT a DME order):  walker  Other Treatments: ***    Patient's personal belongings (please select all that are sent with patient):  {[facility] DME Belongings:[id number]}    RN SIGNATURE:  Electronically signed by Arlene Gutierres RN on 6/17/19 at 1:09 PM    CASE MANAGEMENT/SOCIAL WORK SECTION    Inpatient Status Date: ***    Readmission Risk Assessment Score:  Readmission Risk              Risk of Unplanned Readmission:        13 Discharging to Facility/ Agency   · Name: Naomy Smith  · Address:  · Phone:[phone]  · Fax:[phone]      / signature: Electronically signed by Nigel Johnson RN on 6/17/19 at 11:02 AM    PHYSICIAN SECTION    Prognosis: Good    Condition at Discharge: Stable    Rehab Potential (if transferring to Rehab): Fair    Recommended Labs or Other Treatments After Discharge: None    Physician Certification: I certify the above information and transfer of Meagan Rooney  is necessary for the continuing treatment of the diagnosis listed and that he requires [place] for less 30 days.      Update Admission H&P: No change in H&P    PHYSICIAN SIGNATURE:  Electronically signed by Merline Lamb MD on 6/17/19 at 10:57 AM

## 2019-06-17 NOTE — CARE COORDINATION
Patient admitted from [facility] where he is a long term resident. He was admitted with CAP , hx of dementia, being treated with IVABX. Will likely be ready for discharge per Dr. Quentin Che today or tomorrow . He is on a bed hold there and can return skilled or unskilled . Will await discharge orders when cleared medically to assist with discharge back to ECF.

## 2019-06-17 NOTE — CARE COORDINATION
n[facility]predict outcome report received and reviewed with  Jorge De Luna RN. Report uploaded into patient's record.      Jimena Carrasco RN, BSN, [facility] Transitions Coordinator    [phone]

## 2019-06-17 NOTE — PROGRESS NOTES
Discharged  To [facility] via ambulance transfer. Wife here to visit before discharged. Report called to receiving facility.

## 2019-06-17 NOTE — PROGRESS NOTES
Hospitalist Progress Note      PCP: Gwendolyn Toth MD    Date of Admission: 6/12/2019    Chief Complaint: Altered mental status/lethargy    Subjective: no new c/o. More alert and interactive      Medications:  Reviewed    Infusion Medications    sodium chloride       Scheduled Medications    sodium chloride flush  10 mL Intravenous 2 times per day    enoxaparin  40 mg Subcutaneous Daily    azithromycin  500 mg Intravenous Q24H    And    cefTRIAXone (ROCEPHIN) IV  1 g Intravenous Q24H    levothyroxine  100 mcg Oral Daily    levETIRAcetam  500 mg Oral BID     PRN Meds: sodium chloride flush, magnesium hydroxide, ondansetron, acetaminophen, ipratropium-albuterol      Intake/Output Summary (Last 24 hours) at 6/17/2019 0832  Last data filed at 6/17/2019 0831  Gross per 24 hour   Intake 990 ml   Output --   Net 990 ml       Physical Exam Performed:    /68   Pulse 72   Temp 98.4 °F (36.9 °C) (Oral)   Resp 16   Ht 6' 0.01\" (1.829 m)   Wt 164 lb 0.4 oz (74.4 kg)   SpO2 91%   BMI 22.24 kg/m²     General appearance: No apparent distress, appears stated age and more cooperative. HEENT: Pupils equal, round, and reactive to light. Conjunctivae/corneas clear. Neck: Supple, with full range of motion. No jugular venous distention. Trachea midline. Respiratory:  Normal respiratory effort. Clear to auscultation, bilaterally without Rales/Wheezes/Rhonchi. Cardiovascular: Regular rate and rhythm with normal S1/S2 without murmurs, rubs or gallops. Abdomen: Soft, non-tender, non-distended with normal bowel sounds. Musculoskeletal: No clubbing, cyanosis or edema bilaterally. Full range of motion without deformity. Skin: Skin color, texture, turgor normal.  No rashes or lesions. Neurologic:  Neurovascularly intact without any focal sensory/motor deficits.  Cranial nerves: II-XII intact, grossly non-focal.  Psychiatric: Alert but w/out insight  Capillary Refill: Brisk,< 3 seconds   Peripheral Pulses: +2 palpable, equal bilaterally       Labs:   Recent Labs     06/15/19  0550 06/16/19  0605 06/17/19  0457   WBC 5.9 7.9 6.1   HGB 13.2* 13.8 12.0*   HCT 39.8* 41.0 36.1*    392 348     Recent Labs     06/15/19  0550 06/16/19  0605 06/17/19  0457    133* 131*   K 4.1 4.2 4.0    100 96*   CO2 25 22 24   BUN 15 11 12   CREATININE 0.7* 0.8 0.8   CALCIUM 9.1 9.3 9.0   PHOS 2.9 2.9 3.1     No results for input(s): AST, ALT, BILIDIR, BILITOT, ALKPHOS in the last 72 hours. No results for input(s): INR in the last 72 hours. No results for input(s): Marimar Halon in the last 72 hours. Urinalysis:      Lab Results   Component Value Date    NITRU Negative 06/12/2019    WBCUA 0-2 07/26/2015    BACTERIA 1+ 07/26/2015    RBCUA None seen 07/26/2015    BLOODU Negative 06/12/2019    SPECGRAV 1.010 06/12/2019    GLUCOSEU Negative 06/12/2019       Radiology:  CT CHEST WO CONTRAST   Final Result   Middle lobe and bilateral lower lobe atelectasis versus pneumonia. XR CHEST PORTABLE   Final Result   Reticular and ground-glass opacities have slightly increased since 2017. Pattern may represent progressive interstitial lung disease or mild edema. Assessment/Plan:    Active Hospital Problems    Diagnosis    Community acquired bacterial pneumonia [J15.9]    Chronic dementia, with behavioral disturbance [F03.91]    Aphagia [R13.0]         PNA - Given Maxepime in ED, changed to Rocephin/Azithro 13 June - continued.      Dementia - w/out behavioural disturbance. Continue supportive care.      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 Continue 2nd prevention measures.      HTN/CAD - w/ known CAD and no evidence of active signs/sxs of ischemia/failure. Currently controlled on home meds w/ vitals reviewed and documented as above.     HyperLipidemia - controlled on home Statin.  Continue, w/ f/u and med adjustment w/ PCP     HypoThyroid - clinically euthyroid on oral replacement therapy. Continue, w/ outpt monitoring as previously arranged.         DVT Prophylaxis: LMWH  Diet: DIET DYSPHAGIA I PUREED;  Honey Thick  Code Status: Full Code      PT/OT Eval Status: seen w/ recs for SNF     Dispo -  Discharge back to SNF Monday/Tues 17/18 Mallory Baca MD

## 2019-06-18 LAB — CULTURE, BLOOD 2: NORMAL

## 2019-06-18 NOTE — DISCHARGE SUMMARY
Hospital Medicine Discharge Summary    Patient ID: Susan Wilkinson      Patient's PCP: Racheal Mejia MD    Admit Date: 6/12/2019     Discharge Date: 6/17/2019      Admitting Physician: Marisol Begum MD     Discharge Physician: Sondra Hull MD     Discharge Diagnoses: Active Hospital Problems    Diagnosis    Community acquired bacterial pneumonia [J15.9]    Chronic dementia, with behavioral disturbance [F03.91]    Aphagia [R13.0]       The patient was seen and examined on day of discharge and this discharge summary is in conjunction with any daily progress note from day of discharge. Hospital Course:         PNA - Given Maxepime in ED, changed to Rocephin/Azithro 13 June - continued.      Dementia - w/out behavioural disturbance.  Continue supportive care.      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  Continue 2nd prevention measures.      HTN/CAD - w/ known CAD and no evidence of active signs/sxs of ischemia/failure. Currently controlled on home meds      HyperLipidemia - controlled on home Statin. Continue, w/ f/u and med adjustment w/ PCP     HypoThyroid - clinically euthyroid on oral replacement therapy. Continue, w/ outpt monitoring as previously arranged.         Labs: For convenience and continuity at follow-up the following most recent labs are provided:      CBC:    Lab Results   Component Value Date    WBC 6.1 06/17/2019    HGB 12.0 06/17/2019    HCT 36.1 06/17/2019     06/17/2019       Renal:    Lab Results   Component Value Date     06/17/2019    K 4.0 06/17/2019    K 3.9 06/13/2019    CL 96 06/17/2019    CO2 24 06/17/2019    BUN 12 06/17/2019    CREATININE 0.8 06/17/2019    CALCIUM 9.0 06/17/2019    PHOS 3.1 06/17/2019         Significant Diagnostic Studies    Radiology:   CT CHEST WO CONTRAST   Final Result   Middle lobe and bilateral lower lobe atelectasis versus pneumonia.          XR CHEST PORTABLE   Final Result Reticular and ground-glass opacities have slightly increased since 2017. Pattern may represent progressive interstitial lung disease or mild edema.                 Consults:     IP CONSULT TO HOSPITALIST    Disposition: SNF     Condition at Discharge: Stable    Discharge Instructions/Follow-up:  W/ PCP prn    Code Status:  Full Code    Activity: activity as tolerated    Diet: regular diet      Discharge Medications:     Discharge Medication List as of 6/17/2019  1:21 PM           Details   aspirin EC 81 MG EC tablet Take 1 tablet by mouth daily Start taking medication on 5/31/19, Disp-30 tablet, R-0Normal      amLODIPine (NORVASC) 2.5 MG tablet Take 1 tablet by mouth daily, Disp-30 tablet, R-3Print      levETIRAcetam (KEPPRA) 500 MG tablet Take 1 tablet by mouth 2 times daily for 3 days, Disp-6 tablet, R-0Print      polyethylene glycol (GLYCOLAX) packet Take 17 g by mouth dailyHistorical Med      citalopram (CELEXA) 10 MG tablet Take 10 mg by mouth dailyHistorical Med      melatonin 3 MG TABS tablet Take 3 mg by mouth nightlyHistorical Med      tamsulosin (FLOMAX) 0.4 MG capsule Take 2 capsules by mouth nightly, Disp-30 capsule, R-3Normal      Mirabegron ER (MYRBETRIQ) 50 MG TB24 Take 50 mg by mouth dailyHistorical Med      rosuvastatin (CRESTOR) 20 MG tablet Take 20 mg by mouth dailyHistorical Med      b complex vitamins capsule Take 1 capsule by mouth dailyHistorical Med      ibuprofen (ADVIL;MOTRIN) 200 MG tablet Take 400 mg by mouth every 8 hours as needed for PainHistorical Med      magnesium hydroxide (MILK OF MAGNESIA) 400 MG/5ML suspension Take 30 mLs by mouth daily as needed for ConstipationHistorical Med      levothyroxine (SYNTHROID) 100 MCG tablet Take 1 tablet by mouth every morning (before breakfast)DC to SNF      acetaminophen (TYLENOL) 325 MG tablet Take 2 tablets by mouth every 4 hours as needed for Pain or Fever, Disp-120 tablet, R-3OTC      ferrous sulfate 325 (65 Fe) MG tablet Take 1 tablet by mouth 2 times daily (with meals), Disp-60 tablet, R-1Print             Time Spent on discharge is more than 30 minutes in the examination, evaluation, counseling and review of medications and discharge plan. Signed:    Carlito Clarke MD   6/18/2019      Thank you Rachel Hudson MD for the opportunity to be involved in this patient's care. If you have any questions or concerns please feel free to contact me at [phone].

## 2019-07-12 ENCOUNTER — HOSPITAL ENCOUNTER (OUTPATIENT)
Dept: CT IMAGING | Age: 84
Discharge: HOME | End: 2019-07-12
Payer: MEDICARE

## 2019-07-12 DIAGNOSIS — S06.5XAA SUBDURAL HEMATOMA: ICD-10-CM

## 2019-07-12 PROCEDURE — 70450 CT HEAD/BRAIN W/O DYE: CPT

## 2019-08-06 ENCOUNTER — APPOINTMENT (OUTPATIENT)
Dept: CT IMAGING | Age: 84
End: 2019-08-06
Payer: MEDICARE

## 2019-08-06 ENCOUNTER — HOSPITAL ENCOUNTER (EMERGENCY)
Age: 84
Discharge: HOME | End: 2019-08-06
Attending: EMERGENCY MEDICINE
Payer: MEDICARE

## 2019-08-06 VITALS
BODY MASS INDEX: 22.24 KG/M2 | TEMPERATURE: 98.4 F | HEART RATE: 68 BPM | RESPIRATION RATE: 18 BRPM | WEIGHT: 164 LBS | DIASTOLIC BLOOD PRESSURE: 82 MMHG | SYSTOLIC BLOOD PRESSURE: 144 MMHG | OXYGEN SATURATION: 96 %

## 2019-08-06 DIAGNOSIS — S09.90XA INJURY OF HEAD, INITIAL ENCOUNTER: Primary | ICD-10-CM

## 2019-08-06 PROCEDURE — 72125 CT NECK SPINE W/O DYE: CPT

## 2019-08-06 PROCEDURE — 70450 CT HEAD/BRAIN W/O DYE: CPT

## 2019-08-06 PROCEDURE — 99283 EMERGENCY DEPT VISIT LOW MDM: CPT

## 2019-08-06 ASSESSMENT — PAIN DESCRIPTION - LOCATION: LOCATION: HEAD

## 2019-08-06 ASSESSMENT — PAIN SCALES - GENERAL: PAINLEVEL_OUTOF10: 6

## 2019-08-06 ASSESSMENT — PAIN DESCRIPTION - PAIN TYPE: TYPE: ACUTE PAIN

## 2019-08-06 NOTE — ED NOTES
--Patient provided with discharge instructions and any prescriptions. --Instructions, dosing, and follow-up appointments reviewed with patient/family. No further questions or needs at this time. --Vital signs and patient stable upon discharge. --Patient taken in ambulance back to [facility].       Heather Burns RN  08/06/19 9861

## 2019-10-30 ENCOUNTER — APPOINTMENT (OUTPATIENT)
Dept: GENERAL RADIOLOGY | Age: 84
End: 2019-10-30
Payer: MEDICARE

## 2019-10-30 ENCOUNTER — HOSPITAL ENCOUNTER (EMERGENCY)
Age: 84
Discharge: HOME | End: 2019-10-30
Attending: EMERGENCY MEDICINE
Payer: MEDICARE

## 2019-10-30 ENCOUNTER — APPOINTMENT (OUTPATIENT)
Dept: CT IMAGING | Age: 84
End: 2019-10-30
Payer: MEDICARE

## 2019-10-30 VITALS
DIASTOLIC BLOOD PRESSURE: 77 MMHG | WEIGHT: 164 LBS | HEART RATE: 77 BPM | SYSTOLIC BLOOD PRESSURE: 123 MMHG | BODY MASS INDEX: 22.21 KG/M2 | TEMPERATURE: 97.8 F | HEIGHT: 72 IN | RESPIRATION RATE: 18 BRPM | OXYGEN SATURATION: 94 %
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
## 2019-10-30 DIAGNOSIS — M25.522 LEFT ELBOW PAIN: ICD-10-CM

## 2019-10-30 DIAGNOSIS — M25.561 ACUTE PAIN OF RIGHT KNEE: ICD-10-CM

## 2019-10-30 DIAGNOSIS — M79.601 RIGHT ARM PAIN: ICD-10-CM

## 2019-10-30 DIAGNOSIS — S09.90XA INJURY OF HEAD, INITIAL ENCOUNTER: ICD-10-CM

## 2019-10-30 PROCEDURE — 6360000002 HC RX W HCPCS: Performed by: EMERGENCY MEDICINE

## 2019-10-30 PROCEDURE — 73100 X-RAY EXAM OF WRIST: CPT

## 2019-10-30 PROCEDURE — 72125 CT NECK SPINE W/O DYE: CPT

## 2019-10-30 PROCEDURE — 73560 X-RAY EXAM OF KNEE 1 OR 2: CPT

## 2019-10-30 PROCEDURE — 99285 EMERGENCY DEPT VISIT HI MDM: CPT

## 2019-10-30 PROCEDURE — 73070 X-RAY EXAM OF ELBOW: CPT

## 2019-10-30 PROCEDURE — 70450 CT HEAD/BRAIN W/O DYE: CPT

## 2019-10-30 PROCEDURE — 96372 THER/PROPH/DIAG INJ SC/IM: CPT

## 2019-10-30 PROCEDURE — 73030 X-RAY EXAM OF SHOULDER: CPT

## 2019-10-30 RX ORDER — HALOPERIDOL 5 MG/ML
5 INJECTION INTRAMUSCULAR ONCE
Status: COMPLETED | OUTPATIENT
Start: 2019-10-30 | End: 2019-10-30

## 2019-10-30 RX ORDER — DIPHENHYDRAMINE HYDROCHLORIDE 50 MG/ML
25 INJECTION INTRAMUSCULAR; INTRAVENOUS ONCE
Status: COMPLETED | OUTPATIENT
Start: 2019-10-30 | End: 2019-10-30

## 2019-10-30 RX ORDER — LORAZEPAM 2 MG/ML
INJECTION INTRAMUSCULAR
Status: DISCONTINUED
Start: 2019-10-30 | End: 2019-10-31 | Stop reason: HOSPADM

## 2019-10-30 RX ORDER — LORAZEPAM 2 MG/ML
2 INJECTION INTRAMUSCULAR ONCE
Status: COMPLETED | OUTPATIENT
Start: 2019-10-30 | End: 2019-10-30

## 2019-10-30 RX ADMIN — LORAZEPAM 2 MG: 2 INJECTION, SOLUTION INTRAMUSCULAR; INTRAVENOUS at 20:12

## 2019-10-30 RX ADMIN — DIPHENHYDRAMINE HYDROCHLORIDE 25 MG: 50 INJECTION, SOLUTION INTRAMUSCULAR; INTRAVENOUS at 20:16

## 2019-10-30 RX ADMIN — HALOPERIDOL LACTATE 5 MG: 5 INJECTION INTRAMUSCULAR at 20:17

## 2019-10-30 RX ADMIN — LORAZEPAM 2 MG: 2 INJECTION, SOLUTION INTRAMUSCULAR; INTRAVENOUS at 20:13

## 2019-10-30 ASSESSMENT — ENCOUNTER SYMPTOMS
NAUSEA: 0
BACK PAIN: 0
SHORTNESS OF BREATH: 0
ABDOMINAL PAIN: 0
SORE THROAT: 0
VOMITING: 0
COUGH: 0

## 2020-01-23 ENCOUNTER — APPOINTMENT (OUTPATIENT)
Dept: GENERAL RADIOLOGY | Age: 85
End: 2020-01-23
Payer: MEDICARE

## 2020-01-23 ENCOUNTER — HOSPITAL ENCOUNTER (EMERGENCY)
Age: 85
Discharge: HOME | End: 2020-01-23
Attending: EMERGENCY MEDICINE
Payer: MEDICARE

## 2020-01-23 VITALS
HEART RATE: 59 BPM | DIASTOLIC BLOOD PRESSURE: 76 MMHG | SYSTOLIC BLOOD PRESSURE: 136 MMHG | OXYGEN SATURATION: 96 % | RESPIRATION RATE: 20 BRPM | TEMPERATURE: 98 F

## 2020-01-23 PROCEDURE — 73552 X-RAY EXAM OF FEMUR 2/>: CPT

## 2020-01-23 PROCEDURE — 73502 X-RAY EXAM HIP UNI 2-3 VIEWS: CPT

## 2020-01-23 PROCEDURE — 99284 EMERGENCY DEPT VISIT MOD MDM: CPT

## 2020-01-23 ASSESSMENT — PAIN DESCRIPTION - PAIN TYPE: TYPE: ACUTE PAIN

## 2020-01-23 ASSESSMENT — PAIN DESCRIPTION - ORIENTATION: ORIENTATION: RIGHT

## 2020-01-23 ASSESSMENT — PAIN DESCRIPTION - LOCATION: LOCATION: LEG

## 2020-01-23 NOTE — ED NOTES
Patient resting quietly in bed, sleeping, no distress noted, respirations even and unlabored. Awaiting transport to the [facility], transport to arrive ( [facility]) at 0830.       Roman Vaz RN  01/23/20 1257

## 2020-01-23 NOTE — ED NOTES
Report called to [facility]. Bedside report given to [facility]. Pt transported via ambulance back to [facility].       [facility]  01/23/20 3309

## 2020-01-23 NOTE — ED PROVIDER NOTES
CHIEF COMPLAINT  Leg Pain (right leg pain, abrasion to right knee area)      HISTORY OF PRESENT ILLNESS  Gabe Alvarado is a 80 y.o. male who presents to the ED complaining of right leg pain. He was started complaining about this to staff so they sent him in here he is from a [facility] [address] in [facility]. He started complaining of this pain tonight he denies any pain anywhere else. Patient has a history of a stroke and has residual slurred speech and right-sided weakness. He had a stroke back in 2013. Patient is denying any chest pain or shortness of breath or any headache or dizziness he denies any falls even though he does have a history of frequent falls. No other complaints, modifying factors or associated symptoms. Nursing notes reviewed. Past Medical History:   Diagnosis Date    Coronary artery disease     CVA (cerebral infarction)     L frontal (11/2013)    Hypercholesteremia     Seizure, petit mal ([address] 11/28/2013    Thyroid disease     Unspecified cerebral artery occlusion with cerebral infarction     Right sided weakness - uses cane     Past Surgical History:   Procedure Laterality Date    APPENDECTOMY      CARDIAC SURGERY      stents 11/11    CAROTID ENDARTERECTOMY Left 11-    CHOLECYSTECTOMY, LAPAROSCOPIC  1-22-15    CORONARY ARTERY BYPASS GRAFT  2009    FRACTURE SURGERY       History reviewed. No pertinent family history.   Social History     Socioeconomic History    Marital status:      Spouse name: Not on file    Number of children: Not on file    Years of education: Not on file    Highest education level: Not on file   Occupational History    Not on file   Social Needs    Financial resource strain: Not on file    Food insecurity:     Worry: Not on file     Inability: Not on file    Transportation needs:     Medical: Not on file     Non-medical: Not on file   Tobacco Use    Smoking status: Former Smoker     Packs/day: 0.50     Years: 60.00 (MILK OF MAGNESIA) 400 MG/5ML suspension Take 30 mLs by mouth daily as needed for Constipation      levothyroxine (SYNTHROID) 100 MCG tablet Take 1 tablet by mouth every morning (before breakfast)      acetaminophen (TYLENOL) 325 MG tablet Take 2 tablets by mouth every 4 hours as needed for Pain or Fever 120 tablet 3    ferrous sulfate 325 (65 Fe) MG tablet Take 1 tablet by mouth 2 times daily (with meals) 60 tablet 1     No Known Allergies    REVIEW OF SYSTEMS  6 systems reviewed, pertinent positives per HPI otherwise noted to be negative    PHYSICAL EXAM  BP (!) 157/72   Pulse 71   Temp 98.4 °F (36.9 °C) (Oral)   Resp 14   SpO2 94%   GENERAL APPEARANCE: Awake and alert. Cooperative. No acute distress. HEAD: Normocephalic. Atraumatic. EYES: PERRL. EOM's grossly intact. ENT: Mucous membranes are moist.   NECK: Supple. Normal ROM. HEART: RRR,  no murmur/rubs/gallop  CHEST/LUNGS: Breathing is unlabored. Speaking comfortably in full sentences. EXTREMITIES: .  Small old abrasion noted on the lateral upper right knee no tenderness to palpation of the thigh no other trauma noted no swelling or bruising no calf tenderness. .. No acute deformities. All extremities neurovascularly intact. SKIN: Warm and dry. NEUROLOGICAL: Alert and oriented. Slurred speech (old), Normal coordination, R side weakness arm and leg (old). RADIOLOGY  X-RAYS:  I have reviewed radiologic plain film image(s). ALL OTHER NON-PLAIN FILM IMAGES SUCH AS CT, ULTRASOUND AND MRI HAVE BEEN READ BY THE RADIOLOGIST. XR FEMUR RIGHT (MIN 2 VIEWS)   Final Result   No fracture or malalignment. XR HIP RIGHT (2-3 VIEWS)   Final Result   No fracture or malalignment. ED COURSE/MDM  Patient seen and evaluated. I discussed results and plan of care with patient . I do feel patient can be safely discharged to home. Recommend follow up with PCP in 2-3 days for re-evaluation. Reasons to RT ED discussed.  Patient expresses

## 2020-02-22 ENCOUNTER — APPOINTMENT (OUTPATIENT)
Dept: GENERAL RADIOLOGY | Age: 85
DRG: 640 | End: 2020-02-22
Payer: MEDICARE

## 2020-02-22 ENCOUNTER — HOSPITAL ENCOUNTER (INPATIENT)
Age: 85
LOS: 6 days | Discharge: NURSING, REHAB OR LONG-TERM CARE | DRG: 640 | End: 2020-02-28
Attending: EMERGENCY MEDICINE | Admitting: INTERNAL MEDICINE
Payer: MEDICARE

## 2020-02-22 ENCOUNTER — APPOINTMENT (OUTPATIENT)
Dept: CT IMAGING | Age: 85
DRG: 640 | End: 2020-02-22
Payer: MEDICARE

## 2020-02-22 PROBLEM — E86.0 DEHYDRATION: Status: ACTIVE | Noted: 2020-02-22

## 2020-02-22 LAB
A/G RATIO: 1 (ref 1.1–2.2)
ALBUMIN SERPL-MCNC: 3.4 G/DL (ref 3.4–5)
ALP BLD-CCNC: 69 U/L (ref 40–129)
ALT SERPL-CCNC: 16 U/L (ref 10–40)
AMORPHOUS: NORMAL /HPF
ANION GAP SERPL CALCULATED.3IONS-SCNC: 13 MMOL/L (ref 3–16)
AST SERPL-CCNC: 26 U/L (ref 15–37)
BASE EXCESS VENOUS: -0.1 MMOL/L (ref -3–3)
BASOPHILS ABSOLUTE: 0 K/UL (ref 0–0.2)
BASOPHILS RELATIVE PERCENT: 0.1 %
BILIRUB SERPL-MCNC: 0.7 MG/DL (ref 0–1)
BILIRUBIN URINE: NEGATIVE
BLOOD, URINE: ABNORMAL
BUN BLDV-MCNC: 36 MG/DL (ref 7–20)
CALCIUM SERPL-MCNC: 9.1 MG/DL (ref 8.3–10.6)
CARBOXYHEMOGLOBIN: 1.4 % (ref 0–1.5)
CHLORIDE BLD-SCNC: 101 MMOL/L (ref 99–110)
CLARITY: CLEAR
CO2: 25 MMOL/L (ref 21–32)
COLOR: YELLOW
CREAT SERPL-MCNC: 0.9 MG/DL (ref 0.8–1.3)
EKG ATRIAL RATE: 71 BPM
EKG DIAGNOSIS: NORMAL
EKG P AXIS: 57 DEGREES
EKG P-R INTERVAL: 208 MS
EKG Q-T INTERVAL: 470 MS
EKG QRS DURATION: 178 MS
EKG QTC CALCULATION (BAZETT): 510 MS
EKG R AXIS: 107 DEGREES
EKG T AXIS: 39 DEGREES
EKG VENTRICULAR RATE: 71 BPM
EOSINOPHILS ABSOLUTE: 0 K/UL (ref 0–0.6)
EOSINOPHILS RELATIVE PERCENT: 0 %
GFR AFRICAN AMERICAN: >60
GFR NON-AFRICAN AMERICAN: >60
GLOBULIN: 3.3 G/DL
GLUCOSE BLD-MCNC: 124 MG/DL (ref 70–99)
GLUCOSE URINE: NEGATIVE MG/DL
HCO3 VENOUS: 25.3 MMOL/L (ref 23–29)
HCT VFR BLD CALC: 41.3 % (ref 40.5–52.5)
HEMOGLOBIN: 13.6 G/DL (ref 13.5–17.5)
KETONES, URINE: NEGATIVE MG/DL
LACTIC ACID: 1.5 MMOL/L (ref 0.4–2)
LEUKOCYTE ESTERASE, URINE: NEGATIVE
LYMPHOCYTES ABSOLUTE: 0.8 K/UL (ref 1–5.1)
LYMPHOCYTES RELATIVE PERCENT: 8.4 %
MCH RBC QN AUTO: 30.5 PG (ref 26–34)
MCHC RBC AUTO-ENTMCNC: 33 G/DL (ref 31–36)
MCV RBC AUTO: 92.3 FL (ref 80–100)
METHEMOGLOBIN VENOUS: 0.6 %
MICROSCOPIC EXAMINATION: YES
MONOCYTES ABSOLUTE: 0.7 K/UL (ref 0–1.3)
MONOCYTES RELATIVE PERCENT: 7.7 %
NEUTROPHILS ABSOLUTE: 8 K/UL (ref 1.7–7.7)
NEUTROPHILS RELATIVE PERCENT: 83.8 %
NITRITE, URINE: NEGATIVE
O2 CONTENT, VEN: 15 VOL %
O2 SAT, VEN: 75 %
O2 THERAPY: ABNORMAL
PCO2, VEN: 43.8 MMHG (ref 40–50)
PDW BLD-RTO: 13.8 % (ref 12.4–15.4)
PH UA: 6 (ref 5–8)
PH VENOUS: 7.38 (ref 7.35–7.45)
PLATELET # BLD: 174 K/UL (ref 135–450)
PMV BLD AUTO: 8.5 FL (ref 5–10.5)
PO2, VEN: 41.4 MMHG (ref 25–40)
POTASSIUM SERPL-SCNC: 3.9 MMOL/L (ref 3.5–5.1)
PRO-BNP: 815 PG/ML (ref 0–449)
PROTEIN UA: ABNORMAL MG/DL
RBC # BLD: 4.47 M/UL (ref 4.2–5.9)
RBC UA: NORMAL /HPF (ref 0–4)
SODIUM BLD-SCNC: 139 MMOL/L (ref 136–145)
SPECIFIC GRAVITY UA: 1.02 (ref 1–1.03)
TCO2 CALC VENOUS: 27 MMOL/L
TOTAL PROTEIN: 6.7 G/DL (ref 6.4–8.2)
URINE TYPE: ABNORMAL
UROBILINOGEN, URINE: 0.2 E.U./DL
WBC # BLD: 9.5 K/UL (ref 4–11)
WBC UA: NORMAL /HPF (ref 0–5)

## 2020-02-22 PROCEDURE — 6360000002 HC RX W HCPCS: Performed by: EMERGENCY MEDICINE

## 2020-02-22 PROCEDURE — 72170 X-RAY EXAM OF PELVIS: CPT

## 2020-02-22 PROCEDURE — 93010 ELECTROCARDIOGRAM REPORT: CPT | Performed by: INTERNAL MEDICINE

## 2020-02-22 PROCEDURE — 2060000000 HC ICU INTERMEDIATE R&B

## 2020-02-22 PROCEDURE — 72125 CT NECK SPINE W/O DYE: CPT

## 2020-02-22 PROCEDURE — 70450 CT HEAD/BRAIN W/O DYE: CPT

## 2020-02-22 PROCEDURE — 96360 HYDRATION IV INFUSION INIT: CPT

## 2020-02-22 PROCEDURE — 6370000000 HC RX 637 (ALT 250 FOR IP): Performed by: INTERNAL MEDICINE

## 2020-02-22 PROCEDURE — 81001 URINALYSIS AUTO W/SCOPE: CPT

## 2020-02-22 PROCEDURE — 71045 X-RAY EXAM CHEST 1 VIEW: CPT

## 2020-02-22 PROCEDURE — 85025 COMPLETE CBC W/AUTO DIFF WBC: CPT

## 2020-02-22 PROCEDURE — 2700000000 HC OXYGEN THERAPY PER DAY

## 2020-02-22 PROCEDURE — 51702 INSERT TEMP BLADDER CATH: CPT

## 2020-02-22 PROCEDURE — 83880 ASSAY OF NATRIURETIC PEPTIDE: CPT

## 2020-02-22 PROCEDURE — 99285 EMERGENCY DEPT VISIT HI MDM: CPT

## 2020-02-22 PROCEDURE — 2580000003 HC RX 258: Performed by: EMERGENCY MEDICINE

## 2020-02-22 PROCEDURE — 82803 BLOOD GASES ANY COMBINATION: CPT

## 2020-02-22 PROCEDURE — 93005 ELECTROCARDIOGRAM TRACING: CPT | Performed by: EMERGENCY MEDICINE

## 2020-02-22 PROCEDURE — 80053 COMPREHEN METABOLIC PANEL: CPT

## 2020-02-22 PROCEDURE — 83605 ASSAY OF LACTIC ACID: CPT

## 2020-02-22 PROCEDURE — 2580000003 HC RX 258: Performed by: INTERNAL MEDICINE

## 2020-02-22 RX ORDER — FERROUS SULFATE 325(65) MG
325 TABLET ORAL 2 TIMES DAILY WITH MEALS
Status: DISCONTINUED | OUTPATIENT
Start: 2020-02-22 | End: 2020-02-28 | Stop reason: HOSPADM

## 2020-02-22 RX ORDER — 0.9 % SODIUM CHLORIDE 0.9 %
500 INTRAVENOUS SOLUTION INTRAVENOUS ONCE
Status: COMPLETED | OUTPATIENT
Start: 2020-02-22 | End: 2020-02-22

## 2020-02-22 RX ORDER — FUROSEMIDE 10 MG/ML
20 INJECTION INTRAMUSCULAR; INTRAVENOUS ONCE
Status: COMPLETED | OUTPATIENT
Start: 2020-02-22 | End: 2020-02-22

## 2020-02-22 RX ORDER — MIRTAZAPINE 15 MG/1
15 TABLET, FILM COATED ORAL NIGHTLY
Status: DISCONTINUED | OUTPATIENT
Start: 2020-02-22 | End: 2020-02-28 | Stop reason: HOSPADM

## 2020-02-22 RX ORDER — ONDANSETRON 2 MG/ML
4 INJECTION INTRAMUSCULAR; INTRAVENOUS EVERY 6 HOURS PRN
Status: DISCONTINUED | OUTPATIENT
Start: 2020-02-22 | End: 2020-02-22 | Stop reason: ALTCHOICE

## 2020-02-22 RX ORDER — ASPIRIN 81 MG/1
81 TABLET ORAL DAILY
Status: DISCONTINUED | OUTPATIENT
Start: 2020-02-22 | End: 2020-02-28 | Stop reason: HOSPADM

## 2020-02-22 RX ORDER — ACETAMINOPHEN 325 MG/1
650 TABLET ORAL EVERY 4 HOURS PRN
Status: DISCONTINUED | OUTPATIENT
Start: 2020-02-22 | End: 2020-02-28 | Stop reason: HOSPADM

## 2020-02-22 RX ORDER — RISPERIDONE 0.5 MG/1
0.5 TABLET, FILM COATED ORAL 2 TIMES DAILY
COMMUNITY

## 2020-02-22 RX ORDER — LOPERAMIDE HYDROCHLORIDE 2 MG/1
2 CAPSULE ORAL 4 TIMES DAILY PRN
COMMUNITY

## 2020-02-22 RX ORDER — SODIUM CHLORIDE 9 MG/ML
INJECTION, SOLUTION INTRAVENOUS CONTINUOUS
Status: DISPENSED | OUTPATIENT
Start: 2020-02-22 | End: 2020-02-23

## 2020-02-22 RX ORDER — MIRTAZAPINE 15 MG/1
15 TABLET, FILM COATED ORAL NIGHTLY
COMMUNITY

## 2020-02-22 RX ORDER — SENNA AND DOCUSATE SODIUM 50; 8.6 MG/1; MG/1
1 TABLET, FILM COATED ORAL DAILY
Status: DISCONTINUED | OUTPATIENT
Start: 2020-02-22 | End: 2020-02-28 | Stop reason: HOSPADM

## 2020-02-22 RX ORDER — ROSUVASTATIN CALCIUM 10 MG/1
20 TABLET, COATED ORAL NIGHTLY
Status: DISCONTINUED | OUTPATIENT
Start: 2020-02-22 | End: 2020-02-28 | Stop reason: HOSPADM

## 2020-02-22 RX ORDER — POLYETHYLENE GLYCOL 3350 17 G/17G
17 POWDER, FOR SOLUTION ORAL DAILY PRN
Status: DISCONTINUED | OUTPATIENT
Start: 2020-02-22 | End: 2020-02-25

## 2020-02-22 RX ORDER — LEVOTHYROXINE SODIUM 0.1 MG/1
100 TABLET ORAL
Status: DISCONTINUED | OUTPATIENT
Start: 2020-02-23 | End: 2020-02-28 | Stop reason: HOSPADM

## 2020-02-22 RX ORDER — SODIUM CHLORIDE 0.9 % (FLUSH) 0.9 %
10 SYRINGE (ML) INJECTION EVERY 12 HOURS SCHEDULED
Status: DISCONTINUED | OUTPATIENT
Start: 2020-02-22 | End: 2020-02-28 | Stop reason: HOSPADM

## 2020-02-22 RX ORDER — TAMSULOSIN HYDROCHLORIDE 0.4 MG/1
0.8 CAPSULE ORAL NIGHTLY
Status: DISCONTINUED | OUTPATIENT
Start: 2020-02-22 | End: 2020-02-28 | Stop reason: HOSPADM

## 2020-02-22 RX ORDER — ACETAMINOPHEN 325 MG/1
650 TABLET ORAL EVERY 6 HOURS PRN
Status: DISCONTINUED | OUTPATIENT
Start: 2020-02-22 | End: 2020-02-22 | Stop reason: SDUPTHER

## 2020-02-22 RX ORDER — AMOXICILLIN 250 MG
1 CAPSULE ORAL DAILY
COMMUNITY

## 2020-02-22 RX ORDER — AMLODIPINE BESYLATE 5 MG/1
2.5 TABLET ORAL DAILY
Status: DISCONTINUED | OUTPATIENT
Start: 2020-02-22 | End: 2020-02-28 | Stop reason: HOSPADM

## 2020-02-22 RX ORDER — POLYETHYLENE GLYCOL 3350 17 G/17G
17 POWDER, FOR SOLUTION ORAL DAILY
Status: DISCONTINUED | OUTPATIENT
Start: 2020-02-22 | End: 2020-02-28

## 2020-02-22 RX ORDER — LIDOCAINE HYDROCHLORIDE AND EPINEPHRINE BITARTRATE 20; .01 MG/ML; MG/ML
20 INJECTION, SOLUTION SUBCUTANEOUS ONCE
Status: DISCONTINUED | OUTPATIENT
Start: 2020-02-22 | End: 2020-02-22 | Stop reason: ALTCHOICE

## 2020-02-22 RX ORDER — SODIUM CHLORIDE 0.9 % (FLUSH) 0.9 %
10 SYRINGE (ML) INJECTION PRN
Status: DISCONTINUED | OUTPATIENT
Start: 2020-02-22 | End: 2020-02-28 | Stop reason: HOSPADM

## 2020-02-22 RX ORDER — CITALOPRAM 20 MG/1
10 TABLET ORAL DAILY
Status: DISCONTINUED | OUTPATIENT
Start: 2020-02-22 | End: 2020-02-28 | Stop reason: HOSPADM

## 2020-02-22 RX ORDER — PROMETHAZINE HYDROCHLORIDE 25 MG/1
12.5 TABLET ORAL EVERY 6 HOURS PRN
Status: DISCONTINUED | OUTPATIENT
Start: 2020-02-22 | End: 2020-02-28 | Stop reason: HOSPADM

## 2020-02-22 RX ORDER — RISPERIDONE 0.25 MG/1
0.5 TABLET, FILM COATED ORAL 2 TIMES DAILY
Status: DISCONTINUED | OUTPATIENT
Start: 2020-02-22 | End: 2020-02-28 | Stop reason: HOSPADM

## 2020-02-22 RX ORDER — ACETAMINOPHEN 650 MG/1
650 SUPPOSITORY RECTAL EVERY 6 HOURS PRN
Status: DISCONTINUED | OUTPATIENT
Start: 2020-02-22 | End: 2020-02-28 | Stop reason: HOSPADM

## 2020-02-22 RX ADMIN — SODIUM CHLORIDE: 9 INJECTION, SOLUTION INTRAVENOUS at 16:35

## 2020-02-22 RX ADMIN — MIRTAZAPINE 15 MG: 15 TABLET, FILM COATED ORAL at 21:03

## 2020-02-22 RX ADMIN — SODIUM CHLORIDE 500 ML: 9 INJECTION, SOLUTION INTRAVENOUS at 11:57

## 2020-02-22 RX ADMIN — RISPERIDONE 0.5 MG: 0.25 TABLET ORAL at 21:08

## 2020-02-22 RX ADMIN — CARBAMIDE PEROXIDE 6.5% 2 DROP: 6.5 LIQUID AURICULAR (OTIC) at 16:35

## 2020-02-22 RX ADMIN — FUROSEMIDE 20 MG: 10 INJECTION, SOLUTION INTRAMUSCULAR; INTRAVENOUS at 16:35

## 2020-02-22 RX ADMIN — TAMSULOSIN HYDROCHLORIDE 0.8 MG: 0.4 CAPSULE ORAL at 21:12

## 2020-02-22 RX ADMIN — FERROUS SULFATE TAB 325 MG (65 MG ELEMENTAL FE) 325 MG: 325 (65 FE) TAB at 16:35

## 2020-02-22 RX ADMIN — Medication 3 MG: at 21:12

## 2020-02-22 RX ADMIN — ROSUVASTATIN CALCIUM 20 MG: 10 TABLET, FILM COATED ORAL at 21:04

## 2020-02-22 ASSESSMENT — PAIN SCALES - GENERAL: PAINLEVEL_OUTOF10: 0

## 2020-02-22 NOTE — ED NOTES
[facility] [id number]  Re: admission  Dr Karlie Cordero called back @1300     Jeannie Guerrero  02/22/20 2746

## 2020-02-22 NOTE — PROGRESS NOTES
[facility] Patient oriented to room, call light, bed rails, phone, lights and bathroom. Patient instructed about the schedule of the day including: vital sign frequency, lab draws, possible tests, frequency of MD and staff rounds, including RN/MD rounding together at bedside, daily weights, and I &O's. Patient instructed about prescribed diet, how to use 8MENU, and television. Bed alarm in place, patient aware of placement and reason. Telemetry box in place, patient aware of placement and reason. Bed locked, in lowest position, side rails up 2/4, call light within reach. Will continue to monitor.   Electronically signed by Miles Adams RN on 2/22/2020 at 2:23 PM

## 2020-02-22 NOTE — ED PROVIDER NOTES
Radames Bocanegra is an 80year old male who is brought to the ED by EMS following an unwitnessed fall at The [facility]. He has a laceration at the top of his head. Staff reports his mentation is altered. On arrival he is awake, spontaneous eye opening, localizes to pain. GCS 11.        BP (!) 118/55   Pulse 77   Temp 99.3 °F (37.4 °C) (Core)   Resp 20   Ht 6' (1.829 m)   Wt 164 lb (74.4 kg)   SpO2 96%   BMI 22.24 kg/m²     I have reviewed the following from the nursing documentation:      Prior to Admission medications    Medication Sig Start Date End Date Taking?  Authorizing Provider   aspirin EC 81 MG EC tablet Take 1 tablet by mouth daily Start taking medication on 5/31/19 5/31/19   Pedro Bergeron MD   amLODIPine (NORVASC) 2.5 MG tablet Take 1 tablet by mouth daily 5/20/19   Pedro Bergeron MD   levETIRAcetam (KEPPRA) 500 MG tablet Take 1 tablet by mouth 2 times daily for 3 days 5/20/19 8/6/19  Pedro Bergeron MD   polyethylene glycol [facility]) packet Take 17 g by mouth daily    Historical Provider, MD   citalopram (CELEXA) 10 MG tablet Take 10 mg by mouth daily    Historical Provider, MD   melatonin 3 MG TABS tablet Take 3 mg by mouth nightly    Historical Provider, MD   tamsulosin (FLOMAX) 0.4 MG capsule Take 2 capsules by mouth nightly 43/96/05   Shimon Truong, APRN - CNP   Mirabegron ER (MYRBETRIQ) 50 MG TB24 Take 50 mg by mouth daily    Historical Provider, MD   rosuvastatin (CRESTOR) 20 MG tablet Take 20 mg by mouth daily    Historical Provider, MD   b complex vitamins capsule Take 1 capsule by mouth daily    Historical Provider, MD   ibuprofen (ADVIL;MOTRIN) 200 MG tablet Take 400 mg by mouth every 8 hours as needed for Pain    Historical Provider, MD   magnesium hydroxide (MILK OF MAGNESIA) 400 MG/5ML suspension Take 30 mLs by mouth daily as needed for Constipation    Historical Provider, MD   levothyroxine (SYNTHROID) 100 MCG tablet Take 1 tablet by mouth every morning (before breakfast) 9/21/17 markings bilaterally, may be related to interstitial lung disease versus mild pulmonary vascular congestion, grossly stable. Discoid atelectasis at the left lung base. EKG   The Ekg interpreted by me in the absence of a cardiologist shows. normal sinus rhythm with a rate of 71 with first degree AV block and frequent PACs and RBBB present  Axis is   107 degrees  QTc is  510 ms  Intervals and Durations are unremarkable. No specific ST-T wave changes appreciated. No evidence of acute ischemia.    No significant change from prior EKG dated 12 June 2019         David Gibbs MD  02/22/20 2082

## 2020-02-23 LAB
ALBUMIN SERPL-MCNC: 2.8 G/DL (ref 3.4–5)
ANION GAP SERPL CALCULATED.3IONS-SCNC: 11 MMOL/L (ref 3–16)
BUN BLDV-MCNC: 34 MG/DL (ref 7–20)
CALCIUM SERPL-MCNC: 8.7 MG/DL (ref 8.3–10.6)
CHLORIDE BLD-SCNC: 104 MMOL/L (ref 99–110)
CO2: 23 MMOL/L (ref 21–32)
CREAT SERPL-MCNC: 0.7 MG/DL (ref 0.8–1.3)
GFR AFRICAN AMERICAN: >60
GFR NON-AFRICAN AMERICAN: >60
GLUCOSE BLD-MCNC: 100 MG/DL (ref 70–99)
HCT VFR BLD CALC: 37.5 % (ref 40.5–52.5)
HEMOGLOBIN: 12.1 G/DL (ref 13.5–17.5)
MCH RBC QN AUTO: 30.2 PG (ref 26–34)
MCHC RBC AUTO-ENTMCNC: 32.4 G/DL (ref 31–36)
MCV RBC AUTO: 93.3 FL (ref 80–100)
PDW BLD-RTO: 14.3 % (ref 12.4–15.4)
PHOSPHORUS: 2.4 MG/DL (ref 2.5–4.9)
PLATELET # BLD: 159 K/UL (ref 135–450)
PMV BLD AUTO: 8.1 FL (ref 5–10.5)
POTASSIUM SERPL-SCNC: 3.5 MMOL/L (ref 3.5–5.1)
RBC # BLD: 4.02 M/UL (ref 4.2–5.9)
SODIUM BLD-SCNC: 138 MMOL/L (ref 136–145)
WBC # BLD: 7.7 K/UL (ref 4–11)

## 2020-02-23 PROCEDURE — 2060000000 HC ICU INTERMEDIATE R&B

## 2020-02-23 PROCEDURE — 2580000003 HC RX 258: Performed by: INTERNAL MEDICINE

## 2020-02-23 PROCEDURE — 6370000000 HC RX 637 (ALT 250 FOR IP): Performed by: INTERNAL MEDICINE

## 2020-02-23 PROCEDURE — 97530 THERAPEUTIC ACTIVITIES: CPT

## 2020-02-23 PROCEDURE — 80069 RENAL FUNCTION PANEL: CPT

## 2020-02-23 PROCEDURE — 2500000003 HC RX 250 WO HCPCS: Performed by: INTERNAL MEDICINE

## 2020-02-23 PROCEDURE — 97162 PT EVAL MOD COMPLEX 30 MIN: CPT

## 2020-02-23 PROCEDURE — 36415 COLL VENOUS BLD VENIPUNCTURE: CPT

## 2020-02-23 PROCEDURE — 2700000000 HC OXYGEN THERAPY PER DAY

## 2020-02-23 PROCEDURE — 94761 N-INVAS EAR/PLS OXIMETRY MLT: CPT

## 2020-02-23 PROCEDURE — 85027 COMPLETE CBC AUTOMATED: CPT

## 2020-02-23 RX ORDER — DEXTROSE, SODIUM CHLORIDE, AND POTASSIUM CHLORIDE 5; .45; .15 G/100ML; G/100ML; G/100ML
INJECTION INTRAVENOUS CONTINUOUS
Status: DISPENSED | OUTPATIENT
Start: 2020-02-23 | End: 2020-02-24

## 2020-02-23 RX ADMIN — FERROUS SULFATE TAB 325 MG (65 MG ELEMENTAL FE) 325 MG: 325 (65 FE) TAB at 18:08

## 2020-02-23 RX ADMIN — CITALOPRAM HYDROBROMIDE 10 MG: 20 TABLET ORAL at 11:59

## 2020-02-23 RX ADMIN — TAMSULOSIN HYDROCHLORIDE 0.8 MG: 0.4 CAPSULE ORAL at 21:43

## 2020-02-23 RX ADMIN — LEVOTHYROXINE SODIUM 100 MCG: 0.1 TABLET ORAL at 05:48

## 2020-02-23 RX ADMIN — SENNOSIDES AND DOCUSATE SODIUM 1 TABLET: 8.6; 5 TABLET ORAL at 11:59

## 2020-02-23 RX ADMIN — AMLODIPINE BESYLATE 2.5 MG: 5 TABLET ORAL at 12:00

## 2020-02-23 RX ADMIN — CARBAMIDE PEROXIDE 6.5% 2 DROP: 6.5 LIQUID AURICULAR (OTIC) at 21:44

## 2020-02-23 RX ADMIN — POTASSIUM CHLORIDE, DEXTROSE MONOHYDRATE AND SODIUM CHLORIDE: 150; 5; 450 INJECTION, SOLUTION INTRAVENOUS at 15:47

## 2020-02-23 RX ADMIN — Medication 3 MG: at 21:43

## 2020-02-23 RX ADMIN — RISPERIDONE 0.5 MG: 0.25 TABLET ORAL at 12:00

## 2020-02-23 RX ADMIN — Medication 10 ML: at 21:44

## 2020-02-23 RX ADMIN — POLYETHYLENE GLYCOL 3350 17 G: 17 POWDER, FOR SOLUTION ORAL at 11:58

## 2020-02-23 RX ADMIN — MIRTAZAPINE 15 MG: 15 TABLET, FILM COATED ORAL at 21:43

## 2020-02-23 RX ADMIN — ASPIRIN 81 MG: 81 TABLET, COATED ORAL at 12:00

## 2020-02-23 RX ADMIN — RISPERIDONE 0.5 MG: 0.25 TABLET ORAL at 21:43

## 2020-02-23 RX ADMIN — ROSUVASTATIN CALCIUM 20 MG: 10 TABLET, FILM COATED ORAL at 21:43

## 2020-02-23 ASSESSMENT — PAIN SCALES - GENERAL
PAINLEVEL_OUTOF10: 0

## 2020-02-23 NOTE — PROGRESS NOTES
Pt yelling and refusing medications. Will attempt to give morning meds at another time when pt is less agitated.  Electronically signed by Mynor Knapp RN on 2/23/2020 at 10:56 AM

## 2020-02-23 NOTE — H&P
Hospital Medicine History & Physical      PCP: Komal Stevenson MD    Date of Admission: 2/22/2020    Date of Service: Pt seen/examined on23 Feb and Admitted to Inpatient with expected LOS greater than two midnights due to medical therapy. Chief Complaint: Fall w/ altered mental status. History Of Present Illness:       80 y.o. male resident of [facility] who presented to [facility] after an unwitnessed fall w/ laceration noted to the top of his head w/ minimal c/o pain and no associated focal neuro findings. SNF Staff reported mental status change of unclear duration. He is w/out c/o when seen. The patient denies any fever/chills or other signs/sxs of systemic illness or identifiable aggravating/alleviating factors. Past Medical History:          Diagnosis Date    Coronary artery disease     CVA (cerebral infarction)     L frontal (11/2013)    Hypercholesteremia     Seizure, petit mal ([address] 11/28/2013    Thyroid disease     Unspecified cerebral artery occlusion with cerebral infarction     Right sided weakness - uses cane       Past Surgical History:          Procedure Laterality Date    APPENDECTOMY      CARDIAC SURGERY      stents 11/11    CAROTID ENDARTERECTOMY Left 11-    CHOLECYSTECTOMY, LAPAROSCOPIC  1-22-15    CORONARY ARTERY BYPASS GRAFT  2009    FRACTURE SURGERY         Medications Prior to Admission:      Prior to Admission medications    Medication Sig Start Date End Date Taking?  Authorizing Provider   carbamide peroxide (DEBROX) 6.5 % otic solution 2 drops 2 times daily   Yes Historical Provider, MD   loperamide (IMODIUM) 2 MG capsule Take 2 mg by mouth 4 times daily as needed for Diarrhea   Yes Historical Provider, MD   mirtazapine (REMERON) 15 MG tablet Take 15 mg by mouth nightly   Yes Historical Provider, MD   risperiDONE (RISPERDAL) 0.5 MG tablet Take 0.5 mg by mouth 2 times daily   Yes Historical Provider, MD tineo (Gagan Langford)

## 2020-02-23 NOTE — PROGRESS NOTES
infarction. has a past surgical history that includes Appendectomy; fracture surgery; Carotid endarterectomy (Left, 11-); Cardiac surgery; Coronary artery bypass graft (2009); and Cholecystectomy, laparoscopic (1-22-15). Restrictions  Restrictions/Precautions  Restrictions/Precautions: General Precautions, Up as Tolerated, Fall Risk  Position Activity Restriction  Other position/activity restrictions: Sullivan, Avasys, high fall risk per RN assessment     Subjective  General  Chart Reviewed: Yes  Patient assessed for rehabilitation services?: Yes  Response To Previous Treatment: Not applicable  Family / Caregiver Present: No  Referring Practitioner: Pipe Berry  Referral Date : 02/23/20  Diagnosis: unwitnessed fall at The [address] [place] [address]: Impaired  General Comment  Comments: RN cleared pt for therapy eval.  Subjective  Subjective: Patient resting supine in bed upon therapy arrival.  Patient agreeable to sitting up in chair. Pain Screening  Patient Currently in Pain: Denies     Orientation  Orientation  Overall Orientation Status: Impaired  Orientation Level: Disoriented to situation;Disoriented to time;Disoriented to place; Disoriented to person     Social/Functional History  Social/Functional History  Type of Home: Facility([facility])  Additional Comments: Unable to gain PLOF info as pt did not answer most questions. Per RN, he is WC bound at facility and does not ambulate, but is able to propel self in WC.       Cognition   Cognition  Overall Cognitive Status: Exceptions  Arousal/Alertness: Inconsistent responses to stimuli;Delayed responses to stimuli  Following Commands: Inconsistently follows commands  Attention Span: Difficulty attending to directions  Memory: (unknown as he did not answer most questions)  Safety Judgement: Decreased awareness of need for assistance;Decreased awareness of need for safety  Problem Solving: Decreased awareness of errors  Insights: Not aware of deficits  Initiation: Requires cues for all  Sequencing: Requires cues for all    Objective  Bed mobility  Supine to Sit: Maximum assistance  Sit to Supine: Unable to assess(seated in recliner at end of session)  Scooting: Maximal assistance     Transfers  Sit to Stand: Moderate Assistance;2 Person Assistance  Stand to sit: Moderate Assistance;2 Person Assistance  Stand Pivot Transfers: Moderate Assistance;2 Person Assistance     Ambulation  Ambulation?: No(WC at baseline)     Balance  Sitting - Static: Poor; +  Sitting - Dynamic: Poor  Standing - Static: Poor        Plan   Plan  Times per week: 2-3x/wk  Times per day: Daily  Current Treatment Recommendations: Strengthening, Transfer Training, Endurance Training, Balance Training, Functional Mobility Training  Safety Devices  Type of devices: All fall risk precautions in place, Left in chair, Chair alarm in place, Call light within reach, Telesitter in use, Nurse notified, Patient at risk for falls, Gait belt    AM-PAC Score  AM-PAC Inpatient Mobility without Stair Climbing Raw Score : 9 (02/23/20 1449)  AM-PAC Inpatient without Stair Climbing T-Scale Score : 32.44 (02/23/20 1449)  Mobility Inpatient CMS 0-100% Score: 76.07 (02/23/20 1449)  Mobility Inpatient without Stair CMS G-Code Modifier : CL (02/23/20 1449)     Goals  Short term goals  Time Frame for Short term goals: 1 week  Short term goal 1: Patient will complete bed mobility with min A. Short term goal 2: Patient will perform functional transfer with min A and LRAD. Short term goal 3: Patient will tolerate 10-15 reps of B LE exercises.   Patient Goals   Patient goals : unable to state       Therapy Time   Individual Concurrent Group Co-treatment   Time In 1409         Time Out 1429         Minutes 20         Timed Code Treatment Minutes: [address] [place], [place], DPT #[id number]

## 2020-02-24 ENCOUNTER — APPOINTMENT (OUTPATIENT)
Dept: GENERAL RADIOLOGY | Age: 85
DRG: 640 | End: 2020-02-24
Payer: MEDICARE

## 2020-02-24 LAB
ALBUMIN SERPL-MCNC: 2.7 G/DL (ref 3.4–5)
ANION GAP SERPL CALCULATED.3IONS-SCNC: 9 MMOL/L (ref 3–16)
BUN BLDV-MCNC: 23 MG/DL (ref 7–20)
CALCIUM SERPL-MCNC: 8.4 MG/DL (ref 8.3–10.6)
CHLORIDE BLD-SCNC: 103 MMOL/L (ref 99–110)
CO2: 24 MMOL/L (ref 21–32)
CREAT SERPL-MCNC: 0.7 MG/DL (ref 0.8–1.3)
GFR AFRICAN AMERICAN: >60
GFR NON-AFRICAN AMERICAN: >60
GLUCOSE BLD-MCNC: 132 MG/DL (ref 70–99)
HCT VFR BLD CALC: 37.7 % (ref 40.5–52.5)
HEMOGLOBIN: 12.4 G/DL (ref 13.5–17.5)
MCH RBC QN AUTO: 30.1 PG (ref 26–34)
MCHC RBC AUTO-ENTMCNC: 32.9 G/DL (ref 31–36)
MCV RBC AUTO: 91.4 FL (ref 80–100)
PDW BLD-RTO: 13.6 % (ref 12.4–15.4)
PHOSPHORUS: 2.2 MG/DL (ref 2.5–4.9)
PLATELET # BLD: 198 K/UL (ref 135–450)
PMV BLD AUTO: 8.1 FL (ref 5–10.5)
POTASSIUM SERPL-SCNC: 3.5 MMOL/L (ref 3.5–5.1)
RBC # BLD: 4.12 M/UL (ref 4.2–5.9)
SODIUM BLD-SCNC: 136 MMOL/L (ref 136–145)
WBC # BLD: 8.4 K/UL (ref 4–11)

## 2020-02-24 PROCEDURE — 73030 X-RAY EXAM OF SHOULDER: CPT

## 2020-02-24 PROCEDURE — 2060000000 HC ICU INTERMEDIATE R&B

## 2020-02-24 PROCEDURE — 73090 X-RAY EXAM OF FOREARM: CPT

## 2020-02-24 PROCEDURE — 6370000000 HC RX 637 (ALT 250 FOR IP): Performed by: INTERNAL MEDICINE

## 2020-02-24 PROCEDURE — 97110 THERAPEUTIC EXERCISES: CPT

## 2020-02-24 PROCEDURE — 36415 COLL VENOUS BLD VENIPUNCTURE: CPT

## 2020-02-24 PROCEDURE — 2700000000 HC OXYGEN THERAPY PER DAY

## 2020-02-24 PROCEDURE — 2580000003 HC RX 258: Performed by: INTERNAL MEDICINE

## 2020-02-24 PROCEDURE — 94761 N-INVAS EAR/PLS OXIMETRY MLT: CPT

## 2020-02-24 PROCEDURE — 73120 X-RAY EXAM OF HAND: CPT

## 2020-02-24 PROCEDURE — 85027 COMPLETE CBC AUTOMATED: CPT

## 2020-02-24 PROCEDURE — 2500000003 HC RX 250 WO HCPCS: Performed by: INTERNAL MEDICINE

## 2020-02-24 PROCEDURE — 80069 RENAL FUNCTION PANEL: CPT

## 2020-02-24 PROCEDURE — 73060 X-RAY EXAM OF HUMERUS: CPT

## 2020-02-24 PROCEDURE — 97167 OT EVAL HIGH COMPLEX 60 MIN: CPT

## 2020-02-24 RX ORDER — DEXTROSE, SODIUM CHLORIDE, AND POTASSIUM CHLORIDE 5; .45; .15 G/100ML; G/100ML; G/100ML
INJECTION INTRAVENOUS CONTINUOUS
Status: DISPENSED | OUTPATIENT
Start: 2020-02-24 | End: 2020-02-25

## 2020-02-24 RX ADMIN — POTASSIUM CHLORIDE, DEXTROSE MONOHYDRATE AND SODIUM CHLORIDE: 150; 5; 450 INJECTION, SOLUTION INTRAVENOUS at 10:38

## 2020-02-24 RX ADMIN — Medication 3 MG: at 22:00

## 2020-02-24 RX ADMIN — SENNOSIDES AND DOCUSATE SODIUM 1 TABLET: 8.6; 5 TABLET ORAL at 09:00

## 2020-02-24 RX ADMIN — ROSUVASTATIN CALCIUM 20 MG: 10 TABLET, FILM COATED ORAL at 22:01

## 2020-02-24 RX ADMIN — POTASSIUM CHLORIDE, DEXTROSE MONOHYDRATE AND SODIUM CHLORIDE: 150; 5; 450 INJECTION, SOLUTION INTRAVENOUS at 13:38

## 2020-02-24 RX ADMIN — RISPERIDONE 0.5 MG: 0.25 TABLET ORAL at 22:01

## 2020-02-24 RX ADMIN — POTASSIUM CHLORIDE, DEXTROSE MONOHYDRATE AND SODIUM CHLORIDE: 150; 5; 450 INJECTION, SOLUTION INTRAVENOUS at 04:52

## 2020-02-24 RX ADMIN — FERROUS SULFATE TAB 325 MG (65 MG ELEMENTAL FE) 325 MG: 325 (65 FE) TAB at 09:00

## 2020-02-24 RX ADMIN — POTASSIUM PHOSPHATE, MONOBASIC AND POTASSIUM PHOSPHATE, DIBASIC 20 MMOL: 224; 236 INJECTION, SOLUTION, CONCENTRATE INTRAVENOUS at 13:36

## 2020-02-24 RX ADMIN — ASPIRIN 81 MG: 81 TABLET, COATED ORAL at 09:00

## 2020-02-24 RX ADMIN — FERROUS SULFATE TAB 325 MG (65 MG ELEMENTAL FE) 325 MG: 325 (65 FE) TAB at 17:50

## 2020-02-24 RX ADMIN — AMLODIPINE BESYLATE 2.5 MG: 5 TABLET ORAL at 09:00

## 2020-02-24 RX ADMIN — CITALOPRAM HYDROBROMIDE 10 MG: 20 TABLET ORAL at 09:00

## 2020-02-24 RX ADMIN — LEVOTHYROXINE SODIUM 100 MCG: 0.1 TABLET ORAL at 04:53

## 2020-02-24 RX ADMIN — RISPERIDONE 0.5 MG: 0.25 TABLET ORAL at 09:00

## 2020-02-24 RX ADMIN — TAMSULOSIN HYDROCHLORIDE 0.8 MG: 0.4 CAPSULE ORAL at 22:00

## 2020-02-24 RX ADMIN — ACETAMINOPHEN 650 MG: 325 TABLET ORAL at 09:03

## 2020-02-24 RX ADMIN — MIRTAZAPINE 15 MG: 15 TABLET, FILM COATED ORAL at 22:01

## 2020-02-24 RX ADMIN — POLYETHYLENE GLYCOL 3350 17 G: 17 POWDER, FOR SOLUTION ORAL at 09:01

## 2020-02-24 RX ADMIN — Medication 10 ML: at 09:01

## 2020-02-24 ASSESSMENT — PAIN SCALES - GENERAL
PAINLEVEL_OUTOF10: 5
PAINLEVEL_OUTOF10: 0
PAINLEVEL_OUTOF10: 5
PAINLEVEL_OUTOF10: 5

## 2020-02-24 ASSESSMENT — PAIN DESCRIPTION - FREQUENCY
FREQUENCY: CONTINUOUS
FREQUENCY: CONTINUOUS

## 2020-02-24 ASSESSMENT — PAIN DESCRIPTION - PAIN TYPE
TYPE: ACUTE PAIN
TYPE: ACUTE PAIN

## 2020-02-24 ASSESSMENT — PAIN DESCRIPTION - LOCATION
LOCATION: GENERALIZED;BACK;SHOULDER
LOCATION: GENERALIZED;BACK;SHOULDER

## 2020-02-24 ASSESSMENT — PAIN DESCRIPTION - ONSET
ONSET: ON-GOING
ONSET: ON-GOING

## 2020-02-24 ASSESSMENT — PAIN DESCRIPTION - PROGRESSION: CLINICAL_PROGRESSION: GRADUALLY WORSENING

## 2020-02-24 ASSESSMENT — PAIN DESCRIPTION - DESCRIPTORS
DESCRIPTORS: ACHING;DISCOMFORT;CONSTANT
DESCRIPTORS: ACHING;DISCOMFORT

## 2020-02-24 ASSESSMENT — PAIN - FUNCTIONAL ASSESSMENT: PAIN_FUNCTIONAL_ASSESSMENT: PREVENTS OR INTERFERES SOME ACTIVE ACTIVITIES AND ADLS

## 2020-02-24 NOTE — CARE COORDINATION
CASE MANAGEMENT INITIAL ASSESSMENT        Reviewed chart and met with patient today, re: Triggered by age and diagnosis; LTC patient from nursing home, [facility].         Family present: None  Primary contact information: Nayely Crespo spouse [phone]     Admit date/status: Inpatient 2/22/2020  Diagnosis: dehydration     Insurance: Medicare and Medicaid  Precert required for SNF - N        3 night stay required - Y     Living arrangements, Adls, care needs, prior to admission: Lives at [facility] long term care resident     Transportation: TBD     Durable Medical Equipment at home: Defer to [place] [id number] in the home and/or outpatient, prior to admission: Lives at nursing home     PT/OT recs: [address] [facility] [address] Notification (HEN): N/A long term at [facility] [place]     Barriers to discharge: None     Plan/comments: To return to the [facility] where he resides. He can return any time.  Will follow for any barriers that arise that prevent patient from returning to [facility].      ECOC on chart for MD signature

## 2020-02-24 NOTE — PROGRESS NOTES
Patient is awake, calm and disoriented x 4. Assessment is complete, see flow sheet. Bed in lowest position, wheels locked, call light is within reach. Patient denies any further needs at the moment. Will continue to monitor.     Vitals:    02/24/20 0820   BP: (!) 151/64   Pulse: 67   Resp: 19   Temp: 98.3 °F (36.8 °C)   SpO2: 95%     PRN tylenol given - pt whining/wincing in pain when repositioning  Avasys in place

## 2020-02-24 NOTE — PROGRESS NOTES
Occupational Therapy   Occupational Therapy Initial Assessment  Date: 2020   Patient Name: Xochitl Martinez  MRN: [id number]     : 12/15/1931    Date of Service: 2020    Discharge Recommendations:  ECF with OT(pending participation with OT)  OT Equipment Recommendations  Equipment Needed: No    Assessment   Performance deficits / Impairments: Decreased functional mobility ; Decreased safe awareness;Decreased balance;Decreased coordination;Decreased cognition;Decreased ADL status; Decreased ROM; Decreased strength;Decreased endurance;Decreased fine motor control  Assessment: Pt did not provide social or PLOF information at time of eval, partially d/t cognition and also becoming agitated with therapist's prompts. Pt refusing EOB/OOB activity, attempting to swat therapist away with LUE near end of session, and poor tolerance of therex/ROM attempts either UE. Continue to assess with OT tx as pt willing to participate. Prognosis: Poor  Decision Making: High Complexity  OT Education: OT Role;Plan of Care;ADL Adaptive Strategies;Transfer Training;Orientation;Home Exercise Program  Barriers to Learning: Cognition, agitation  REQUIRES OT FOLLOW UP: Yes  Activity Tolerance  Activity Tolerance: Treatment limited secondary to decreased cognition;Treatment limited secondary to agitation  Activity Tolerance: Pt with SOB this session, difficulty following commands for breathing techniques  Safety Devices  Safety Devices in place: Yes  Type of devices: Left in bed;Call light within reach; Bed alarm in place;Nurse notified           Patient Diagnosis(es): The primary encounter diagnosis was Injury of head, initial encounter. Diagnoses of Laceration of scalp, initial encounter, Volume depletion, Acute congestive heart failure, unspecified heart failure type ([address] and Coma, unspecified coma depth ([address] were also pertinent to this visit.      has a past medical history of Coronary artery disease, CVA (cerebral

## 2020-02-24 NOTE — PROGRESS NOTES
than R UE contracture. Skin: Skin color, texture, turgor normal.  No rashes or lesions. Neurologic:  Neurovascularly intact without any focal sensory/motor deficits other than RUE. Cranial nerves: II-XII intact, grossly non-focal.  Psychiatric: Alert and oriented, thought content appropriate, normal insight but slow to respond  Capillary Refill: Brisk,< 3 seconds   Peripheral Pulses: +2 palpable, equal bilaterally       Labs:   Recent Labs     02/22/20  1000 02/23/20  0946   WBC 9.5 7.7   HGB 13.6 12.1*   HCT 41.3 37.5*    159     Recent Labs     02/22/20  1000 02/23/20  0753    138   K 3.9 3.5    104   CO2 25 23   BUN 36* 34*   CREATININE 0.9 0.7*   CALCIUM 9.1 8.7   PHOS  --  2.4*     Recent Labs     02/22/20  1000   AST 26   ALT 16   BILITOT 0.7   ALKPHOS 69     No results for input(s): INR in the last 72 hours. No results for input(s): Jennifer Renae in the last 72 hours. Urinalysis:      Lab Results   Component Value Date    NITRU Negative 02/22/2020    WBCUA 0-2 02/22/2020    BACTERIA 1+ 07/26/2015    RBCUA 0-2 02/22/2020    BLOODU TRACE-LYSED 02/22/2020    SPECGRAV 1.020 02/22/2020    GLUCOSEU Negative 02/22/2020       Consults:    IP CONSULT TO HOSPITALIST      Assessment/Plan:    Active Hospital Problems    Diagnosis    Dehydration [E86.0]    HTN (hypertension), benign [I10]    CAD in native artery [I25.10]    Dyslipidemia [E78.5]    Hypothyroidism [E03.9]       Dehydration - clinically evident on admission likely due to poor PO intake. IVF reordered 24 Feb.      Fall - mechanical, possibly related to above. CT head non-acute w/ old L sided infarct. No evidence of encephalopathy. RUE pain - films ordered.      HTN/CAD - w/ known CAD but no evidence of active signs/sxs of ischemia/failure. Currently controlled on home meds w/ vitals reviewed and documented as above.     HyperLipidemia - controlled on home Statin.  Continue, w/ f/u and med adjustment w/ PCP     HypoThyroid - clinically euthyroid on oral replacement therapy. Continue, w/ outpt monitoring as previously arranged.      HypoPhosphatemia - etiology clinically unable to determine. Follow serial labs and replace PRN - ordered IV 24 Feb.  Reviewed and documented as above.        DVT Prophylaxis: IPC  Diet: DIET GENERAL; Dysphagia Pureed; Moderately Thick (Honey)  Code Status: DNR-CC      PT/OT Eval Status: seen w recs for SNF. Lives at SNF      Dispo - Likely Tues 25 Feb at the earliest pending clinical course and PT/OT recs.      Mehreen Dinero MD

## 2020-02-24 NOTE — PROGRESS NOTES
Goals  Short term goals  Time Frame for Short term goals: 1 week  Short term goal 1: Patient will complete bed mobility with min A. Short term goal 2: Patient will perform functional transfer with min A and LRAD. Short term goal 3: Patient will tolerate 10-15 reps of B LE exercises. Patient Goals   Patient goals : unable to state    Plan    Plan  Times per week: 2-3x/wk  Times per day: Daily  Current Treatment Recommendations: Strengthening, Transfer Training, Endurance Training, Balance Training, Functional Mobility Training  Safety Devices  Type of devices:  All fall risk precautions in place, Call light within reach, Telesitter in use, Nurse notified, Patient at risk for falls, Gait belt, Bed alarm in place, Left in bed     Therapy Time   Individual Concurrent Group Co-treatment   Time In 1308         Time Out 1320         Minutes 12         Timed Code Treatment Minutes: [facility]

## 2020-02-25 ENCOUNTER — APPOINTMENT (OUTPATIENT)
Dept: GENERAL RADIOLOGY | Age: 85
DRG: 640 | End: 2020-02-25
Payer: MEDICARE

## 2020-02-25 LAB
ALBUMIN SERPL-MCNC: 2.8 G/DL (ref 3.4–5)
ANION GAP SERPL CALCULATED.3IONS-SCNC: 10 MMOL/L (ref 3–16)
BUN BLDV-MCNC: 18 MG/DL (ref 7–20)
CALCIUM SERPL-MCNC: 8.7 MG/DL (ref 8.3–10.6)
CHLORIDE BLD-SCNC: 106 MMOL/L (ref 99–110)
CO2: 23 MMOL/L (ref 21–32)
CREAT SERPL-MCNC: 0.6 MG/DL (ref 0.8–1.3)
GFR AFRICAN AMERICAN: >60
GFR NON-AFRICAN AMERICAN: >60
GLUCOSE BLD-MCNC: 117 MG/DL (ref 70–99)
HCT VFR BLD CALC: 36.7 % (ref 40.5–52.5)
HEMOGLOBIN: 12.1 G/DL (ref 13.5–17.5)
MCH RBC QN AUTO: 30.2 PG (ref 26–34)
MCHC RBC AUTO-ENTMCNC: 33 G/DL (ref 31–36)
MCV RBC AUTO: 91.8 FL (ref 80–100)
PDW BLD-RTO: 13.9 % (ref 12.4–15.4)
PHOSPHORUS: 2.8 MG/DL (ref 2.5–4.9)
PLATELET # BLD: 225 K/UL (ref 135–450)
PMV BLD AUTO: 8.2 FL (ref 5–10.5)
POTASSIUM SERPL-SCNC: 3.8 MMOL/L (ref 3.5–5.1)
RBC # BLD: 4 M/UL (ref 4.2–5.9)
SODIUM BLD-SCNC: 139 MMOL/L (ref 136–145)
WBC # BLD: 8.4 K/UL (ref 4–11)

## 2020-02-25 PROCEDURE — 36415 COLL VENOUS BLD VENIPUNCTURE: CPT

## 2020-02-25 PROCEDURE — 6370000000 HC RX 637 (ALT 250 FOR IP): Performed by: INTERNAL MEDICINE

## 2020-02-25 PROCEDURE — 2060000000 HC ICU INTERMEDIATE R&B

## 2020-02-25 PROCEDURE — 80069 RENAL FUNCTION PANEL: CPT

## 2020-02-25 PROCEDURE — 85027 COMPLETE CBC AUTOMATED: CPT

## 2020-02-25 PROCEDURE — 2700000000 HC OXYGEN THERAPY PER DAY

## 2020-02-25 PROCEDURE — 74022 RADEX COMPL AQT ABD SERIES: CPT

## 2020-02-25 PROCEDURE — 2580000003 HC RX 258: Performed by: INTERNAL MEDICINE

## 2020-02-25 PROCEDURE — 94761 N-INVAS EAR/PLS OXIMETRY MLT: CPT

## 2020-02-25 PROCEDURE — 2500000003 HC RX 250 WO HCPCS: Performed by: INTERNAL MEDICINE

## 2020-02-25 PROCEDURE — 94640 AIRWAY INHALATION TREATMENT: CPT

## 2020-02-25 RX ORDER — POLYETHYLENE GLYCOL 3350 17 G/17G
17 POWDER, FOR SOLUTION ORAL 2 TIMES DAILY
Status: DISCONTINUED | OUTPATIENT
Start: 2020-02-25 | End: 2020-02-28 | Stop reason: HOSPADM

## 2020-02-25 RX ORDER — DEXTROSE, SODIUM CHLORIDE, AND POTASSIUM CHLORIDE 5; .45; .15 G/100ML; G/100ML; G/100ML
INJECTION INTRAVENOUS CONTINUOUS
Status: ACTIVE | OUTPATIENT
Start: 2020-02-25 | End: 2020-02-25

## 2020-02-25 RX ORDER — IPRATROPIUM BROMIDE AND ALBUTEROL SULFATE 2.5; .5 MG/3ML; MG/3ML
1 SOLUTION RESPIRATORY (INHALATION) EVERY 4 HOURS PRN
Status: DISCONTINUED | OUTPATIENT
Start: 2020-02-25 | End: 2020-02-28 | Stop reason: HOSPADM

## 2020-02-25 RX ADMIN — POLYETHYLENE GLYCOL 3350 17 G: 17 POWDER, FOR SOLUTION ORAL at 10:44

## 2020-02-25 RX ADMIN — FERROUS SULFATE TAB 325 MG (65 MG ELEMENTAL FE) 325 MG: 325 (65 FE) TAB at 10:43

## 2020-02-25 RX ADMIN — IPRATROPIUM BROMIDE AND ALBUTEROL SULFATE 1 AMPULE: .5; 3 SOLUTION RESPIRATORY (INHALATION) at 12:20

## 2020-02-25 RX ADMIN — ACETAMINOPHEN 650 MG: 325 TABLET ORAL at 11:01

## 2020-02-25 RX ADMIN — BISACODYL 5 MG: 5 TABLET, COATED ORAL at 17:13

## 2020-02-25 RX ADMIN — CARBAMIDE PEROXIDE 6.5% 2 DROP: 6.5 LIQUID AURICULAR (OTIC) at 11:54

## 2020-02-25 RX ADMIN — TAMSULOSIN HYDROCHLORIDE 0.8 MG: 0.4 CAPSULE ORAL at 20:06

## 2020-02-25 RX ADMIN — LEVOTHYROXINE SODIUM 100 MCG: 0.1 TABLET ORAL at 09:44

## 2020-02-25 RX ADMIN — Medication 10 ML: at 10:45

## 2020-02-25 RX ADMIN — AMLODIPINE BESYLATE 5 MG: 5 TABLET ORAL at 10:41

## 2020-02-25 RX ADMIN — POLYETHYLENE GLYCOL 3350 17 G: 17 POWDER, FOR SOLUTION ORAL at 20:08

## 2020-02-25 RX ADMIN — Medication 3 MG: at 20:07

## 2020-02-25 RX ADMIN — MIRTAZAPINE 15 MG: 15 TABLET, FILM COATED ORAL at 20:06

## 2020-02-25 RX ADMIN — CITALOPRAM HYDROBROMIDE 10 MG: 20 TABLET ORAL at 10:56

## 2020-02-25 RX ADMIN — POTASSIUM CHLORIDE, DEXTROSE MONOHYDRATE AND SODIUM CHLORIDE: 150; 5; 450 INJECTION, SOLUTION INTRAVENOUS at 14:21

## 2020-02-25 RX ADMIN — POTASSIUM CHLORIDE, DEXTROSE MONOHYDRATE AND SODIUM CHLORIDE: 150; 5; 450 INJECTION, SOLUTION INTRAVENOUS at 05:40

## 2020-02-25 RX ADMIN — ASPIRIN 81 MG: 81 TABLET, COATED ORAL at 10:44

## 2020-02-25 RX ADMIN — ROSUVASTATIN CALCIUM 20 MG: 10 TABLET, FILM COATED ORAL at 20:05

## 2020-02-25 RX ADMIN — FERROUS SULFATE TAB 325 MG (65 MG ELEMENTAL FE) 325 MG: 325 (65 FE) TAB at 17:13

## 2020-02-25 RX ADMIN — Medication 10 ML: at 20:07

## 2020-02-25 RX ADMIN — CARBAMIDE PEROXIDE 6.5% 2 DROP: 6.5 LIQUID AURICULAR (OTIC) at 20:05

## 2020-02-25 RX ADMIN — RISPERIDONE 0.5 MG: 0.25 TABLET ORAL at 20:06

## 2020-02-25 RX ADMIN — RISPERIDONE 0.5 MG: 0.25 TABLET ORAL at 10:56

## 2020-02-25 RX ADMIN — SENNOSIDES AND DOCUSATE SODIUM 1 TABLET: 8.6; 5 TABLET ORAL at 10:43

## 2020-02-25 ASSESSMENT — PAIN SCALES - PAIN ASSESSMENT IN ADVANCED DEMENTIA (PAINAD)
NEGVOCALIZATION: 2
FACIALEXPRESSION: 0
TOTALSCORE: 9
FACIALEXPRESSION: 2
CONSOLABILITY: 0
NEGVOCALIZATION: 1
TOTALSCORE: 1
BREATHING: 0
CONSOLABILITY: 1
BODYLANGUAGE: 2
BREATHING: 2
BODYLANGUAGE: 0

## 2020-02-25 ASSESSMENT — PAIN SCALES - GENERAL
PAINLEVEL_OUTOF10: 0
PAINLEVEL_OUTOF10: 3
PAINLEVEL_OUTOF10: 0
PAINLEVEL_OUTOF10: 9
PAINLEVEL_OUTOF10: 3

## 2020-02-25 ASSESSMENT — PAIN SCALES - WONG BAKER
WONGBAKER_NUMERICALRESPONSE: 0
WONGBAKER_NUMERICALRESPONSE: 0

## 2020-02-25 NOTE — PROGRESS NOTES
Pattern: Regular Pattern; RR 8-20 = 0    Breath Sounds: Diminished unilaterally = 1    Sputum   ,  , Sputum How Obtained: Spontaneous cough  Cough: Strong, spontaneous, non-productive = 0    Vital Signs   /70   Pulse 83   Temp 98.3 °F (36.8 °C) (Oral)   Resp 20   Ht 6' (1.829 m)   Wt 179 lb 3.7 oz (81.3 kg)   SpO2 92%   BMI 24.31 kg/m²   SPO2 (COPD values may differ): 90-91% on room air or greater than 92% on FiO2 24- 28% = 1    Peak Flow (asthma only): not applicable = 0    RSI: 6         Plan       Goals: medication delivery and improve oxygenation    Patient/caregiver was educated on the proper method of use for Respiratory Care Devices:  Yes      Level of patient/caregiver understanding able to:   ? Verbalize understanding   ? Demonstrate understanding       ? Teach back        ? Needs reinforcement       ? No available caregiver               ? Other:     Response to education:  Good     Is patient being placed on Home Treatment Regimen? No     Does the patient have everything they need prior to discharge? NA     Comments: chart reviewed and pt assessed    Plan of Care: Duoneb txs Q4H prn    Electronically signed by Ciara Basilio RCP on 2/25/2020 at 12:29 PM    Respiratory Protocol Guidelines     1. Assessment and treatment by Respiratory Therapy will be initiated for medication and therapeutic interventions upon initiation of aerosolized medication. 2. Physician will be contacted for respiratory rate (RR) greater than 35 breaths per minute. Therapy will be held for heart rate (HR) greater than 140 beats per minute, pending direction from physician. 3. Bronchodilators will be administered via Metered Dose Inhaler (MDI) with spacer when the following criteria are met:  a. Alert and cooperative     b. HR < 140 bpm  c. RR < 30 bpm                d. Can demonstrate a 2-3 second inspiratory hold  4.  Bronchodilators will be administered via Hand Held Nebulizer [facility]) to patients when ANY of the

## 2020-02-25 NOTE — PROGRESS NOTES
Patient assessment complete and charted. VSS. AO to self only. Speech garbled, but understandable. Patient expresses pain with movement. Bed locked and in lowest position. Non-skid socks in place. Call light within reach. Bed alarm on. Patient states no further needs at this time. Will continue to monitor.

## 2020-02-25 NOTE — PROGRESS NOTES
4 Eyes Skin Assessment     The patient is being assess for   Stage II coccyx    I agree that 2 RN's have performed a thorough Head to Toe Skin Assessment on the patient. ALL assessment sites listed below have been assessed. Areas assessed by both nurses:   [x]   Head, Face, and Ears   [x]   Shoulders, Back, and Chest, Abdomen  [x]   Arms, Elbows, and Hands   [x]   Coccyx, Sacrum, and Ischium  [x]   Legs, Feet, and Heels          **SHARE this note so that the co-signing nurse is able to place an eSignature**    Co-signer eSignature: Electronically signed by Tanya Viramontes RN on 2/25/20 at 12:00 PM    Does the Patient have Skin Breakdown?   Yes LDA WOUND CARE was Initiated documentation include the Cris-wound, Wound Assessment, Measurements, Dressing Treatment, Drainage, and Color\",          Randal Prevention initiated:  Yes   Wound Care Orders initiated:  Yes      [id number]  nurse consulted for Pressure Injury (Stage 3,4, Unstageable, DTI, NWPT, Complex wounds)and New or Established Ostomies:  Yes; stage II newly documented      Primary Nurse eSignature: Electronically signed by Ehsan Flores RN on 2/25/20 at 8:05 AM

## 2020-02-25 NOTE — PROGRESS NOTES
5mg amlodipine given PO at 1041. Repeat /70 HR 83. No s/s noted. Patient and Karoline SIDHU aware. Per Karoline SIDHU she will notify Dr. Heaven Rodriguez.  Will continue to monitor

## 2020-02-25 NOTE — CONSULTS
Nutrition Assessment    Type and Reason for Visit: Initial, Consult(Consult for nutritional assessment for Randal Score)    Nutrition Recommendations:   1. Continue general, pureed diet with moderately thick liquids  2. Add Magic cup BID to help meet pt's needs  3. Encourage po intakes   4. Monitor po intakes, nutrition adequacy, weights, pertinent labs, BMs    Nutrition Assessment: Pt at risk for nutritional compromise d/t poor po intakes. Pt reports that his appetite is good today, noted pt ate all of his breakfast this am. Pt unable to provide much Hx, some Hx obtained from chart. Po intakes variable 1-100% per EMR. Pt needs are increased d/t presence of wounds. Will add ONS to help meet pt's increased needs. Will continue current diet. Malnutrition Assessment:  · Malnutrition Status: At risk for malnutrition  · Context: Acute illness or injury  · Findings of the 6 clinical characteristics of malnutrition (Minimum of 2 out of 6 clinical characteristics is required to make the diagnosis of moderate or severe Protein Calorie Malnutrition based on AND/ASPEN Guidelines):  1. Energy Intake-Less than or equal to 75% of estimated energy requirement, (Since admission)    2. Weight Loss-No significant weight loss,    3. Fat Loss-No significant subcutaneous fat loss,    4. Muscle Loss-No significant muscle mass loss,    5. Fluid Accumulation-No significant fluid accumulation,    6.  Strength-Not measured    Nutrition Risk Level:  Moderate    Nutrient Needs:  · Estimated Daily Total Kcal: 5542-1593(75-22 kcal/kg)  · Estimated Daily Protein (g): 81-98(1.0-1.2 g/kg)  · Estimated Daily Total Fluid (ml/day): 1 ml/kcal    Nutrition Diagnosis:   · Problem: Predicted suboptimal energy intake  · Etiology: related to Insufficient energy/nutrient consumption     Signs and symptoms:  as evidenced by Diet history of poor intake(this admission)    Objective Information:  · Nutrition-Focused Physical Findings: Trace BLE

## 2020-02-26 LAB
ALBUMIN SERPL-MCNC: 2.4 G/DL (ref 3.4–5)
ANION GAP SERPL CALCULATED.3IONS-SCNC: 9 MMOL/L (ref 3–16)
BUN BLDV-MCNC: 18 MG/DL (ref 7–20)
CALCIUM SERPL-MCNC: 8.6 MG/DL (ref 8.3–10.6)
CHLORIDE BLD-SCNC: 108 MMOL/L (ref 99–110)
CO2: 23 MMOL/L (ref 21–32)
CREAT SERPL-MCNC: 0.6 MG/DL (ref 0.8–1.3)
GFR AFRICAN AMERICAN: >60
GFR NON-AFRICAN AMERICAN: >60
GLUCOSE BLD-MCNC: 105 MG/DL (ref 70–99)
HCT VFR BLD CALC: 33.9 % (ref 40.5–52.5)
HEMOGLOBIN: 11.2 G/DL (ref 13.5–17.5)
MCH RBC QN AUTO: 30.3 PG (ref 26–34)
MCHC RBC AUTO-ENTMCNC: 33.1 G/DL (ref 31–36)
MCV RBC AUTO: 91.5 FL (ref 80–100)
PDW BLD-RTO: 14.1 % (ref 12.4–15.4)
PHOSPHORUS: 3 MG/DL (ref 2.5–4.9)
PLATELET # BLD: 253 K/UL (ref 135–450)
PMV BLD AUTO: 7.8 FL (ref 5–10.5)
POTASSIUM SERPL-SCNC: 4.1 MMOL/L (ref 3.5–5.1)
RBC # BLD: 3.7 M/UL (ref 4.2–5.9)
SODIUM BLD-SCNC: 140 MMOL/L (ref 136–145)
WBC # BLD: 8 K/UL (ref 4–11)

## 2020-02-26 PROCEDURE — 94640 AIRWAY INHALATION TREATMENT: CPT

## 2020-02-26 PROCEDURE — 6370000000 HC RX 637 (ALT 250 FOR IP): Performed by: INTERNAL MEDICINE

## 2020-02-26 PROCEDURE — 94761 N-INVAS EAR/PLS OXIMETRY MLT: CPT

## 2020-02-26 PROCEDURE — 2580000003 HC RX 258: Performed by: INTERNAL MEDICINE

## 2020-02-26 PROCEDURE — 2060000000 HC ICU INTERMEDIATE R&B

## 2020-02-26 PROCEDURE — 85027 COMPLETE CBC AUTOMATED: CPT

## 2020-02-26 PROCEDURE — 36415 COLL VENOUS BLD VENIPUNCTURE: CPT

## 2020-02-26 PROCEDURE — 2700000000 HC OXYGEN THERAPY PER DAY

## 2020-02-26 PROCEDURE — 80069 RENAL FUNCTION PANEL: CPT

## 2020-02-26 RX ADMIN — FERROUS SULFATE TAB 325 MG (65 MG ELEMENTAL FE) 325 MG: 325 (65 FE) TAB at 10:21

## 2020-02-26 RX ADMIN — CARBAMIDE PEROXIDE 6.5% 2 DROP: 6.5 LIQUID AURICULAR (OTIC) at 10:21

## 2020-02-26 RX ADMIN — RISPERIDONE 0.5 MG: 0.25 TABLET ORAL at 21:59

## 2020-02-26 RX ADMIN — TAMSULOSIN HYDROCHLORIDE 0.8 MG: 0.4 CAPSULE ORAL at 21:59

## 2020-02-26 RX ADMIN — LEVOTHYROXINE SODIUM 100 MCG: 0.1 TABLET ORAL at 07:12

## 2020-02-26 RX ADMIN — SENNOSIDES AND DOCUSATE SODIUM 1 TABLET: 8.6; 5 TABLET ORAL at 10:20

## 2020-02-26 RX ADMIN — ASPIRIN 81 MG: 81 TABLET, COATED ORAL at 10:20

## 2020-02-26 RX ADMIN — FERROUS SULFATE TAB 325 MG (65 MG ELEMENTAL FE) 325 MG: 325 (65 FE) TAB at 17:09

## 2020-02-26 RX ADMIN — AMLODIPINE BESYLATE 2.5 MG: 5 TABLET ORAL at 10:20

## 2020-02-26 RX ADMIN — Medication 10 ML: at 22:01

## 2020-02-26 RX ADMIN — ROSUVASTATIN CALCIUM 20 MG: 10 TABLET, FILM COATED ORAL at 21:58

## 2020-02-26 RX ADMIN — Medication 3 MG: at 21:59

## 2020-02-26 RX ADMIN — RISPERIDONE 0.5 MG: 0.25 TABLET ORAL at 10:20

## 2020-02-26 RX ADMIN — POLYETHYLENE GLYCOL 3350 17 G: 17 POWDER, FOR SOLUTION ORAL at 10:20

## 2020-02-26 RX ADMIN — POLYETHYLENE GLYCOL 3350 17 G: 17 POWDER, FOR SOLUTION ORAL at 21:58

## 2020-02-26 RX ADMIN — IPRATROPIUM BROMIDE AND ALBUTEROL SULFATE 1 AMPULE: .5; 3 SOLUTION RESPIRATORY (INHALATION) at 19:32

## 2020-02-26 RX ADMIN — BISACODYL 5 MG: 5 TABLET, COATED ORAL at 10:25

## 2020-02-26 RX ADMIN — IPRATROPIUM BROMIDE AND ALBUTEROL SULFATE 1 AMPULE: .5; 3 SOLUTION RESPIRATORY (INHALATION) at 14:37

## 2020-02-26 RX ADMIN — CITALOPRAM HYDROBROMIDE 10 MG: 20 TABLET ORAL at 10:20

## 2020-02-26 RX ADMIN — Medication 10 ML: at 10:21

## 2020-02-26 RX ADMIN — MIRTAZAPINE 15 MG: 15 TABLET, FILM COATED ORAL at 21:59

## 2020-02-26 ASSESSMENT — PAIN SCALES - PAIN ASSESSMENT IN ADVANCED DEMENTIA (PAINAD)
BREATHING: 0
FACIALEXPRESSION: 0
TOTALSCORE: 0
CONSOLABILITY: 0
BODYLANGUAGE: 0
NEGVOCALIZATION: 0

## 2020-02-26 ASSESSMENT — PAIN SCALES - GENERAL: PAINLEVEL_OUTOF10: 0

## 2020-02-26 NOTE — PROGRESS NOTES
active signs/sxs of ischemia/failure. Currently controlled on home meds w/ vitals reviewed and documented as above.     HyperLipidemia - controlled on home Statin. Continue, w/ f/u and med adjustment w/ PCP     HypoThyroid - clinically euthyroid on oral replacement therapy. Continue, w/ outpt monitoring as previously arranged.      HypoPhosphatemia - etiology clinically unable to determine. Follow serial labs and replace PRN - ordered IV 24 Feb.  Reviewed and documented as above. Abdominal Pain - acute but resolved. AXR w/ formidable stool burden, bowel regimen started.         DVT Prophylaxis: IPC  Diet: DIET GENERAL; Dysphagia Pureed; Moderately Thick (Honey)  Dietary Nutrition Supplements: Frozen Oral Supplement  Code Status: DNR-CC      PT/OT Eval Status: seen w recs for SNF. Lives at SNF      Dispo - Likely Thurs 27 Feb at the earliest pending clinical course.      Regine Fang MD

## 2020-02-26 NOTE — PROGRESS NOTES
Patient assessment complete and charted. VSS. AO to self only. Bed locked and in lowest position. Non-skid socks in place. Call light within reach. Bed alarm on. Patient states no further needs at this time. Will continue to monitor.

## 2020-02-27 LAB
ALBUMIN SERPL-MCNC: 2.8 G/DL (ref 3.4–5)
ANION GAP SERPL CALCULATED.3IONS-SCNC: 10 MMOL/L (ref 3–16)
BUN BLDV-MCNC: 21 MG/DL (ref 7–20)
CALCIUM SERPL-MCNC: 8.8 MG/DL (ref 8.3–10.6)
CHLORIDE BLD-SCNC: 107 MMOL/L (ref 99–110)
CO2: 24 MMOL/L (ref 21–32)
CREAT SERPL-MCNC: 0.7 MG/DL (ref 0.8–1.3)
GFR AFRICAN AMERICAN: >60
GFR NON-AFRICAN AMERICAN: >60
GLUCOSE BLD-MCNC: 105 MG/DL (ref 70–99)
HCT VFR BLD CALC: 36.5 % (ref 40.5–52.5)
HEMOGLOBIN: 11.9 G/DL (ref 13.5–17.5)
MCH RBC QN AUTO: 29.8 PG (ref 26–34)
MCHC RBC AUTO-ENTMCNC: 32.6 G/DL (ref 31–36)
MCV RBC AUTO: 91.6 FL (ref 80–100)
PDW BLD-RTO: 14.3 % (ref 12.4–15.4)
PHOSPHORUS: 3.9 MG/DL (ref 2.5–4.9)
PLATELET # BLD: 319 K/UL (ref 135–450)
PMV BLD AUTO: 7.9 FL (ref 5–10.5)
POTASSIUM SERPL-SCNC: 4.3 MMOL/L (ref 3.5–5.1)
RBC # BLD: 3.98 M/UL (ref 4.2–5.9)
SODIUM BLD-SCNC: 141 MMOL/L (ref 136–145)
WBC # BLD: 8.8 K/UL (ref 4–11)

## 2020-02-27 PROCEDURE — 97535 SELF CARE MNGMENT TRAINING: CPT

## 2020-02-27 PROCEDURE — 2580000003 HC RX 258: Performed by: INTERNAL MEDICINE

## 2020-02-27 PROCEDURE — 97530 THERAPEUTIC ACTIVITIES: CPT

## 2020-02-27 PROCEDURE — 80069 RENAL FUNCTION PANEL: CPT

## 2020-02-27 PROCEDURE — 1200000000 HC SEMI PRIVATE

## 2020-02-27 PROCEDURE — 36415 COLL VENOUS BLD VENIPUNCTURE: CPT

## 2020-02-27 PROCEDURE — 85027 COMPLETE CBC AUTOMATED: CPT

## 2020-02-27 PROCEDURE — 6370000000 HC RX 637 (ALT 250 FOR IP): Performed by: INTERNAL MEDICINE

## 2020-02-27 RX ADMIN — RISPERIDONE 0.5 MG: 0.25 TABLET ORAL at 11:18

## 2020-02-27 RX ADMIN — SENNOSIDES AND DOCUSATE SODIUM 1 TABLET: 8.6; 5 TABLET ORAL at 11:20

## 2020-02-27 RX ADMIN — LEVOTHYROXINE SODIUM 100 MCG: 0.1 TABLET ORAL at 05:05

## 2020-02-27 RX ADMIN — AMLODIPINE BESYLATE 2.5 MG: 5 TABLET ORAL at 11:21

## 2020-02-27 RX ADMIN — CARBAMIDE PEROXIDE 6.5% 2 DROP: 6.5 LIQUID AURICULAR (OTIC) at 23:34

## 2020-02-27 RX ADMIN — Medication 10 ML: at 11:21

## 2020-02-27 RX ADMIN — CARBAMIDE PEROXIDE 6.5% 2 DROP: 6.5 LIQUID AURICULAR (OTIC) at 11:24

## 2020-02-27 RX ADMIN — CITALOPRAM HYDROBROMIDE 10 MG: 20 TABLET ORAL at 11:19

## 2020-02-27 RX ADMIN — POLYETHYLENE GLYCOL 3350 17 G: 17 POWDER, FOR SOLUTION ORAL at 11:20

## 2020-02-27 RX ADMIN — ASPIRIN 81 MG: 81 TABLET, COATED ORAL at 11:20

## 2020-02-27 RX ADMIN — FERROUS SULFATE TAB 325 MG (65 MG ELEMENTAL FE) 325 MG: 325 (65 FE) TAB at 11:18

## 2020-02-27 RX ADMIN — ACETAMINOPHEN 650 MG: 325 TABLET ORAL at 05:11

## 2020-02-27 RX ADMIN — Medication 10 ML: at 23:24

## 2020-02-27 ASSESSMENT — PAIN SCALES - GENERAL
PAINLEVEL_OUTOF10: 0
PAINLEVEL_OUTOF10: 4
PAINLEVEL_OUTOF10: 0
PAINLEVEL_OUTOF10: 0

## 2020-02-27 ASSESSMENT — PAIN SCALES - WONG BAKER
WONGBAKER_NUMERICALRESPONSE: 0
WONGBAKER_NUMERICALRESPONSE: 0

## 2020-02-27 NOTE — PROGRESS NOTES
infarction), Hypercholesteremia, Seizure, petit mal ([address] Thyroid disease, and Unspecified cerebral artery occlusion with cerebral infarction. has a past surgical history that includes Appendectomy; fracture surgery; Carotid endarterectomy (Left, 11-); Cardiac surgery; Coronary artery bypass graft (2009); and Cholecystectomy, laparoscopic (1-22-15). Restrictions  Restrictions/Precautions  Restrictions/Precautions: General Precautions, Up as Tolerated, Fall Risk  Position Activity Restriction  Other position/activity restrictions: Sullivan, Avasys, high fall risk per RN assessment  Subjective   General  Chart Reviewed: Yes  Patient assessed for rehabilitation services?: Yes  Family / Caregiver Present: No  Referring Practitioner: Bart Holguin MD  Diagnosis: Dehydration, Fall with AMS  Subjective  Subjective: Pt in bed on arrival, appearing lethargic but alert to name, poor understanding of role of OT/PT and difficulty communicating verbally at baseline per RN  General Comment  Comments: Per RN okay to treat and for OOB       Orientation  Orientation  Overall Orientation Status: Impaired  Orientation Level: Disoriented to situation;Disoriented to time;Oriented to person  Objective    ADL  Feeding: Minimal assistance(setup items on L side of tray. Eliminated clutter on tray.  Assisted pt with hand to mouth motion, pt then able to complete independently )  LE Dressing: Dependent/Total(donning socks )        Balance  Sitting Balance: Stand by assistance(pt able to sit EOB for 2 minutes while room and recliner being setup )  Standing Balance: Maximum assistance  Standing Balance  Time: Less than minute in order to stand pivot transfer   Activity: stand pivot transfer to recliner; stand pivot transfer to bed   Functional Mobility  Functional - Mobility Device: No device  Assist Level: Maximum assistance  Bed mobility  Supine to Sit: Maximum assistance  Scooting: Maximal assistance  Transfers  Sit to stand:

## 2020-02-27 NOTE — PROGRESS NOTES
Hospitalist Progress Note      PCP: Tasia Shepherd MD    Date of Admission: 2/22/2020    Chief Complaint: Fall w/ altered mental status    Subjective: No new c/o. Multiple BM's last 24 hrs. Medications:  Reviewed    Infusion Medications     Scheduled Medications    polyethylene glycol  17 g Oral BID    amLODIPine  2.5 mg Oral Daily    aspirin EC  81 mg Oral Daily    carbamide peroxide  2 drop Both Ears BID    citalopram  10 mg Oral Daily    ferrous sulfate  325 mg Oral BID WC    levothyroxine  100 mcg Oral QAM AC    melatonin ER  3 mg Oral Nightly    mirabegron  50 mg Oral Daily    mirtazapine  15 mg Oral Nightly    polyethylene glycol  17 g Oral Daily    risperiDONE  0.5 mg Oral BID    rosuvastatin  20 mg Oral Nightly    sennosides-docusate sodium  1 tablet Oral Daily    tamsulosin  0.8 mg Oral Nightly    sodium chloride flush  10 mL Intravenous 2 times per day     PRN Meds: ipratropium-albuterol, acetaminophen, sodium chloride flush, acetaminophen, promethazine      Intake/Output Summary (Last 24 hours) at 2/27/2020 0855  Last data filed at 2/27/2020 7136  Gross per 24 hour   Intake 410 ml   Output 0 ml   Net 410 ml       Physical Exam Performed:    BP (!) 158/82   Pulse 77   Temp 97.7 °F (36.5 °C) (Oral)   Resp 16   Ht 6' (1.829 m)   Wt 173 lb 1 oz (78.5 kg)   SpO2 92%   BMI 23.47 kg/m²     General appearance: No apparent distress, appears stated age and cooperative. HEENT: Pupils equal, round, and reactive to light. Conjunctivae/corneas clear. Neck: Supple, with full range of motion. No jugular venous distention. Trachea midline. Respiratory:  Normal respiratory effort. Clear to auscultation, bilaterally without Rales/Wheezes/Rhonchi. Cardiovascular: Regular rate and rhythm with normal S1/S2 without murmurs, rubs or gallops. Abdomen: Soft, non-tender, non-distended with normal bowel sounds. Musculoskeletal: No clubbing, cyanosis or edema bilaterally.   Full range of

## 2020-02-27 NOTE — CARE COORDINATION
NELA spoke with Christoph Orantes at [facility]. She confirmed that pt is LTC there but can be brought back skilled per PT/OT recommendations.  Pt will likely be ready for DC tomorrow 2/28 per MD. Will follow and assist.     Angeles Farfan RN

## 2020-02-27 NOTE — PROGRESS NOTES
Physical Therapy  Facility/Department: [facility] A2 CARD TELEMETRY  Daily Treatment Note  NAME: Sanjay Rizvi  : 12/15/1931  MRN: [id number]    Date of Service: 2020    Discharge Recommendations:  Subacute/Skilled Nursing Facility   PT Equipment Recommendations  Equipment Needed: No    Assessment   Assessment: Pt was pleasant and willing to participate today; required some encouragement. Presented with poor trunk and LE control during bed mobility with the HOB elevated and use of the handrails to get EOB. Requiring assistance and sequencing cues throughout. Transfers were unsteady and unsafe; requiring intermittent assistance for balance and sequencing cues throughout. Pt is severely contracted on right side and presents with a right sided vision cut. Treatment Diagnosis: decreased mobility  Prognosis: Fair  Decision Making: Medium Complexity  PT Education: PT Role;Transfer Training  Barriers to Learning: Cognition  REQUIRES PT FOLLOW UP: Yes     Patient Diagnosis(es): The primary encounter diagnosis was Injury of head, initial encounter. Diagnoses of Laceration of scalp, initial encounter, Volume depletion, Acute congestive heart failure, unspecified heart failure type ([address] and Coma, unspecified coma depth ([address] were also pertinent to this visit. has a past medical history of Coronary artery disease, CVA (cerebral infarction), Hypercholesteremia, Seizure, petit mal ([address] Thyroid disease, and Unspecified cerebral artery occlusion with cerebral infarction. has a past surgical history that includes Appendectomy; fracture surgery; Carotid endarterectomy (Left, 2013); Cardiac surgery; Coronary artery bypass graft (); and Cholecystectomy, laparoscopic (1-22-15). Restrictions  Restrictions/Precautions  Restrictions/Precautions: General Precautions, Up as Tolerated, Fall Risk  Position Activity Restriction  Other position/activity restrictions:  Isaías Sullivan, high fall risk per RN

## 2020-02-28 VITALS
BODY MASS INDEX: 23.56 KG/M2 | RESPIRATION RATE: 16 BRPM | HEIGHT: 72 IN | OXYGEN SATURATION: 90 % | DIASTOLIC BLOOD PRESSURE: 64 MMHG | SYSTOLIC BLOOD PRESSURE: 136 MMHG | WEIGHT: 173.94 LBS | HEART RATE: 70 BPM | TEMPERATURE: 98.5 F

## 2020-02-28 LAB
ALBUMIN SERPL-MCNC: 2.8 G/DL (ref 3.4–5)
ANION GAP SERPL CALCULATED.3IONS-SCNC: 10 MMOL/L (ref 3–16)
BUN BLDV-MCNC: 23 MG/DL (ref 7–20)
CALCIUM SERPL-MCNC: 8.8 MG/DL (ref 8.3–10.6)
CHLORIDE BLD-SCNC: 108 MMOL/L (ref 99–110)
CO2: 23 MMOL/L (ref 21–32)
CREAT SERPL-MCNC: 0.7 MG/DL (ref 0.8–1.3)
GFR AFRICAN AMERICAN: >60
GFR NON-AFRICAN AMERICAN: >60
GLUCOSE BLD-MCNC: 104 MG/DL (ref 70–99)
HCT VFR BLD CALC: 36.2 % (ref 40.5–52.5)
HEMOGLOBIN: 12 G/DL (ref 13.5–17.5)
MCH RBC QN AUTO: 30.5 PG (ref 26–34)
MCHC RBC AUTO-ENTMCNC: 33.2 G/DL (ref 31–36)
MCV RBC AUTO: 91.8 FL (ref 80–100)
PDW BLD-RTO: 14.1 % (ref 12.4–15.4)
PHOSPHORUS: 3.3 MG/DL (ref 2.5–4.9)
PLATELET # BLD: 361 K/UL (ref 135–450)
PMV BLD AUTO: 7.4 FL (ref 5–10.5)
POTASSIUM SERPL-SCNC: 4.2 MMOL/L (ref 3.5–5.1)
RBC # BLD: 3.94 M/UL (ref 4.2–5.9)
SODIUM BLD-SCNC: 141 MMOL/L (ref 136–145)
WBC # BLD: 8.6 K/UL (ref 4–11)

## 2020-02-28 PROCEDURE — 2580000003 HC RX 258: Performed by: INTERNAL MEDICINE

## 2020-02-28 PROCEDURE — 85027 COMPLETE CBC AUTOMATED: CPT

## 2020-02-28 PROCEDURE — 36415 COLL VENOUS BLD VENIPUNCTURE: CPT

## 2020-02-28 PROCEDURE — 80069 RENAL FUNCTION PANEL: CPT

## 2020-02-28 PROCEDURE — 6370000000 HC RX 637 (ALT 250 FOR IP): Performed by: INTERNAL MEDICINE

## 2020-02-28 RX ADMIN — FERROUS SULFATE TAB 325 MG (65 MG ELEMENTAL FE) 325 MG: 325 (65 FE) TAB at 10:24

## 2020-02-28 RX ADMIN — SENNOSIDES AND DOCUSATE SODIUM 1 TABLET: 8.6; 5 TABLET ORAL at 10:24

## 2020-02-28 RX ADMIN — FERROUS SULFATE TAB 325 MG (65 MG ELEMENTAL FE) 325 MG: 325 (65 FE) TAB at 18:36

## 2020-02-28 RX ADMIN — AMLODIPINE BESYLATE 2.5 MG: 5 TABLET ORAL at 10:24

## 2020-02-28 RX ADMIN — Medication 10 ML: at 10:24

## 2020-02-28 RX ADMIN — CARBAMIDE PEROXIDE 6.5% 2 DROP: 6.5 LIQUID AURICULAR (OTIC) at 10:36

## 2020-02-28 RX ADMIN — ASPIRIN 81 MG: 81 TABLET, COATED ORAL at 10:23

## 2020-02-28 RX ADMIN — RISPERIDONE 0.5 MG: 0.25 TABLET ORAL at 10:23

## 2020-02-28 RX ADMIN — CITALOPRAM HYDROBROMIDE 10 MG: 20 TABLET ORAL at 10:23

## 2020-02-28 RX ADMIN — POLYETHYLENE GLYCOL 3350 17 G: 17 POWDER, FOR SOLUTION ORAL at 10:30

## 2020-02-28 RX ADMIN — LEVOTHYROXINE SODIUM 100 MCG: 0.1 TABLET ORAL at 06:41

## 2020-02-28 ASSESSMENT — PAIN SCALES - PAIN ASSESSMENT IN ADVANCED DEMENTIA (PAINAD)
FACIALEXPRESSION: 0
NEGVOCALIZATION: 0
BREATHING: 0
BODYLANGUAGE: 0
TOTALSCORE: 0
BREATHING: 0
BREATHING: 0
BODYLANGUAGE: 0
CONSOLABILITY: 0
NEGVOCALIZATION: 0
NEGVOCALIZATION: 0
CONSOLABILITY: 0
CONSOLABILITY: 0
TOTALSCORE: 0
BODYLANGUAGE: 0
TOTALSCORE: 0
NEGVOCALIZATION: 0
BODYLANGUAGE: 0
FACIALEXPRESSION: 0
BREATHING: 0
BODYLANGUAGE: 0
FACIALEXPRESSION: 0
CONSOLABILITY: 0
FACIALEXPRESSION: 0
NEGVOCALIZATION: 0
CONSOLABILITY: 0
FACIALEXPRESSION: 0
BREATHING: 0
BREATHING: 0
TOTALSCORE: 0
BREATHING: 0
CONSOLABILITY: 0
CONSOLABILITY: 0
BODYLANGUAGE: 0
TOTALSCORE: 0
TOTALSCORE: 0
FACIALEXPRESSION: 0
FACIALEXPRESSION: 0
NEGVOCALIZATION: 0
BODYLANGUAGE: 0
TOTALSCORE: 0
NEGVOCALIZATION: 0

## 2020-02-28 ASSESSMENT — PAIN SCALES - GENERAL
PAINLEVEL_OUTOF10: 0

## 2020-02-28 NOTE — DISCHARGE INSTR - COC
Continuity of Care Form    Patient Name: Mateo Whitman   :  12/15/1931  MRN:  [id number]    Admit date:  2020  Discharge date:  2020    Code Status Order: [facility]   Advance Directives:   [address] Directive Type of Healthcare Directive Copy in [address] Agent's Name Healthcare Agent's Phone Number    [phone]  Yes, patient has an advance directive for healthcare treatment  Durable power of  for health care; Health care treatment directive; Living will  --  --  --  --          Admitting Physician:  Claudia Seaman MD  PCP: Leticia Ashley MD    Discharging Nurse: Lucina Hansen [facility] Unit/Room#: 0201/0201-01  Discharging Unit Phone Number: [phone]    Emergency Contact:   Extended Emergency Contact Information  Primary Emergency Contact: Nicolette Baldwin  Address:  [address] [place] [address], [address] [place] [address],[address] Phone: [phone]  Mobile Phone: [phone]  Relation: Spouse  Secondary Emergency Contact: Conner [address] Phone: [phone]  Mobile Phone: [phone]  Relation: Child    Past Surgical History:  Past Surgical History:   Procedure Laterality Date    APPENDECTOMY      CARDIAC SURGERY      stents     CAROTID ENDARTERECTOMY Left 2013    CHOLECYSTECTOMY, LAPAROSCOPIC  1-22-15    CORONARY ARTERY BYPASS GRAFT  2009    FRACTURE SURGERY         Immunization History:   Immunization History   Administered Date(s) Administered    Influenza, MDCK Quadv, IM, PF (Flucelvax 4 yrs and older) 2017    Pneumococcal Conjugate 13-valent Pasquale Dienes) 2017       Active Problems:  Patient Active Problem List   Diagnosis Code    Cerebral infarction [facility]) I63.9    Coronary artery disease involving native coronary artery of native heart without angina pectoris I25.10    Ataxia R27.0    Seizure, patient):  None    RN SIGNATURE:  Electronically signed by Keisha Lugo RN on 2/28/20 at 5:59 PM    CASE MANAGEMENT/SOCIAL WORK SECTION    Inpatient Status Date: 2/28/20    Readmission Risk Assessment Score:  Readmission Risk              Risk of Unplanned Readmission:        23           Discharging to Facility/ Agency   · Name: [facility]  · Address:  · Phone: [phone]  · Fax: [phone]    Dialysis Facility (if applicable)   · Name:  · Address:  · Dialysis Schedule:  · Phone:  · Fax:    / signature: Electronically signed by Kimmie Rivas RN on 2/28/20 at 2:13 PM    PHYSICIAN SECTION    Prognosis: Good    Condition at Discharge: Stable    Rehab Potential (if transferring to Rehab): Good    Recommended Labs or Other Treatments After Discharge: Renal weekly on Mondays x 3 weeks    Physician Certification: I certify the above information and transfer of Anderson Wilson  is necessary for the continuing treatment of the diagnosis listed and that he requires [place] for less 30 days.      Update Admission H&P: No change in H&P    PHYSICIAN SIGNATURE:  Electronically signed by Dallin Sequeira MD on 2/28/20 at 1:43 PM

## 2020-02-28 NOTE — PROGRESS NOTES
other than R UE contracture. Skin: Skin color, texture, turgor normal.  No rashes or lesions. Neurologic:  Neurovascularly intact without any focal sensory/motor deficits other than RUE. Cranial nerves: II-XII intact, grossly non-focal.  Psychiatric: Alert and oriented, thought content appropriate, normal insight but slow to respond  Capillary Refill: Brisk,< 3 seconds   Peripheral Pulses: +2 palpable, equal bilaterally       Labs:   Recent Labs     02/26/20  0905 02/27/20  0830 02/28/20  0821   WBC 8.0 8.8 8.6   HGB 11.2* 11.9* 12.0*   HCT 33.9* 36.5* 36.2*    319 361     Recent Labs     02/26/20  0905 02/27/20  0830 02/28/20  0821    141 141   K 4.1 4.3 4.2    107 108   CO2 23 24 23   BUN 18 21* 23*   CREATININE 0.6* 0.7* 0.7*   CALCIUM 8.6 8.8 8.8   PHOS 3.0 3.9 3.3     No results for input(s): AST, ALT, BILIDIR, BILITOT, ALKPHOS in the last 72 hours. No results for input(s): INR in the last 72 hours. No results for input(s): Kenneth Huff in the last 72 hours. Urinalysis:      Lab Results   Component Value Date    NITRU Negative 02/22/2020    WBCUA 0-2 02/22/2020    BACTERIA 1+ 07/26/2015    RBCUA 0-2 02/22/2020    BLOODU TRACE-LYSED 02/22/2020    SPECGRAV 1.020 02/22/2020    GLUCOSEU Negative 02/22/2020       Consults:    IP CONSULT TO HOSPITALIST  IP CONSULT TO DIETITIAN      Assessment/Plan:    Active Hospital Problems    Diagnosis    Dehydration [E86.0]    HTN (hypertension), benign [I10]    CAD in native artery [I25.10]    Dyslipidemia [E78.5]    Hypothyroidism [E03.9]       Dehydration - w/ pre-renal Azotemia, clinically evident on admission likely due to poor PO intake. IVF reordered 24 Feb w/ resolution of Azotemia. Will continue to follow serial labs. Reviewed and documented as above.      Fall - mechanical, possibly related to above. CT head non-acute w/ old L sided infarct. No evidence of encephalopathy.   RUE pain - films ordered and negative for evidence of

## 2020-02-28 NOTE — DISCHARGE INSTR - DIET

## 2020-02-28 NOTE — PLAN OF CARE
Problem: Falls - Risk of:  Goal: Will remain free from falls  Description  Will remain free from falls  Outcome: Ongoing     Problem: Risk for Impaired Skin Integrity  Goal: Tissue integrity - skin and mucous membranes  Description  Structural intactness and normal physiological function of skin and  mucous membranes.   Outcome: Ongoing
Pain level will decrease  Description  Pain level will decrease  Outcome: Ongoing  Note:   Pt will be satisfied with pain control. Pt uses numeric pain rating scale with reassessments after pain med administration. Will continue to monitor progression throughout shift. Problem: Risk for Impaired Skin Integrity  Goal: Tissue integrity - skin and mucous membranes  Description  Structural intactness and normal physiological function of skin and  mucous membranes. 2/28/2020 6251 by Jj Zimmer RN  Outcome: Ongoing  Note:   Skin assessment complete. Pt checked for incontinence frequently. Pt turned and repositioned q2hrs as pt allows. Problem: Falls - Risk of:  Goal: Will remain free from falls  Description  Will remain free from falls  2/28/2020 0863 by Jj Zimmer RN  Outcome: Ongoing  Note:   Pt high fall risk. Instructed to use call light before getting out of bed. Call light within reach. Bed in low position. Bed alarm on. Will continue to monitor.
Problem: Pain:  Goal: Pain level will decrease  Description  Pain level will decrease  2/27/2020 0028 by Violette Hillman RN  Outcome: Ongoing  Note:   Pt will be satisfied with pain control. Pt uses numeric pain rating scale with reassessments after pain med administration. Will continue to monitor progression throughout shift. Problem: Risk for Impaired Skin Integrity  Goal: Tissue integrity - skin and mucous membranes  Description  Structural intactness and normal physiological function of skin and  mucous membranes. 2/27/2020 0028 by Violette Hillman RN  Outcome: Ongoing  Note:   Skin assessment complete. Pt checked for incontinence and cleaned up as needed. Will turn and reposition q2hrs as pt allows. Problem: Falls - Risk of:  Goal: Will remain free from falls  Description  Will remain free from falls  2/27/2020 0028 by Violette Hillman RN  Outcome: Ongoing  Note:   Pt will remain free from falls throughout hospital stay. Fall precautions in place, bed alarm on, bed in lowest position with wheels locked and side rails 2/4 up. Room door open and hourly rounding completed. Will continue to monitor throughout shift.

## 2020-03-09 NOTE — DISCHARGE SUMMARY
Hospital Medicine Discharge Summary    Patient ID: Bill Go      Patient's PCP: Eric Kawasaki, MD    Admit Date: 2/22/2020     Discharge Date: 2/28/2020      Admitting Physician: Sanjana Madrigal MD     Discharge Physician: Sanjana Madrigal MD     Discharge Diagnoses: Active Hospital Problems    Diagnosis    Dehydration [E86.0]    HTN (hypertension), benign [I10]    CAD in native artery [I25.10]    Dyslipidemia [E78.5]    Hypothyroidism [E03.9]       The patient was seen and examined on day of discharge and this discharge summary is in conjunction with any daily progress note from day of discharge. Hospital Course:        Dehydration - w/ pre-renal Azotemia, clinically evident on admission likely due to poor PO intake.  IVF reordered 24 Feb w/ resolution of Azotemia. Followed serial labs. Reviewed and documented as above.      Fall - mechanical, possibly related to above.  CT head non-acute w/ old L sided infarct. No evidence of encephalopathy.  RUE pain - films ordered and negative for evidence of fracture - independently reviewed.      HTN/CAD - w/ known CAD but no evidence of active signs/sxs of ischemia/failure. Currently controlled on home meds w/ vitals reviewed and documented as above.     HyperLipidemia - controlled on home Statin. Continue, w/ f/u and med adjustment w/ PCP     HypoThyroid - clinically euthyroid on oral replacement therapy. Continue, w/ outpt monitoring as previously arranged.      HypoPhosphatemia - etiology clinically unable to determine. Followed serial labs and replaced PRN - ordered IV 24 Feb.     Abdominal Pain - acute but resolved. AXR w/ formidable stool burden, aggressive bowel regimen started 25 Feb and successful.      Labs:  For convenience and continuity at follow-up the following most recent labs are provided:      CBC:    Lab Results   Component Value Date    WBC 8.6 02/28/2020    HGB 12.0 02/28/2020    HCT 36.2 02/28/2020     02/28/2020       Renal:    Lab Results   Component Value Date     02/28/2020    K 4.2 02/28/2020    K 3.9 06/13/2019     02/28/2020    CO2 23 02/28/2020    BUN 23 02/28/2020    CREATININE 0.7 02/28/2020    CALCIUM 8.8 02/28/2020    PHOS 3.3 02/28/2020         Significant Diagnostic Studies    Radiology:   XR Acute Abd Series Chest 1 VW   Final Result   Multiple dilated gas-filled loops of small bowel are seen in the mid abdomen. Findings may be related to early distal small bowel obstruction or partial   obstruction given the moderate to large amount of stool throughout the colon. No free air. Low lung volume study with new mild right basilar airspace disease,   atelectasis versus pneumonia. XR HUMERUS RIGHT (MIN 2 VIEWS)   Final Result   No acute osseous or soft tissue abnormality. Degenerative change of the right shoulder and elbow joint spaces. XR HAND RIGHT (2 VIEWS)   Final Result   Limited evaluation due to patient positioning. Some irregularity noted on one view involving the distal radius without   adjacent soft tissue swelling. Findings may represent a nondisplaced   fracture. Recommend additional images with improved positioning of the hand. XR RADIUS ULNA RIGHT (2 VIEWS)   Final Result   1. Somewhat limited evaluation of the right forearm as described above. Within these limitations, no gross acute fracture or dislocation. 2. No acute fracture or dislocation of the right shoulder, degenerative   changes as described above. XR SHOULDER RIGHT (MIN 2 VIEWS)   Final Result   1. Somewhat limited evaluation of the right forearm as described above. Within these limitations, no gross acute fracture or dislocation. 2. No acute fracture or dislocation of the right shoulder, degenerative   changes as described above. CT CERVICAL SPINE WO CONTRAST   Final Result   No acute abnormality of the cervical spine.          CT HEAD WO CONTRAST R-3Normal      Mirabegron ER (MYRBETRIQ) 50 MG TB24 Take 50 mg by mouth dailyHistorical Med      rosuvastatin (CRESTOR) 20 MG tablet Take 20 mg by mouth dailyHistorical Med      b complex vitamins capsule Take 1 capsule by mouth dailyHistorical Med      ibuprofen (ADVIL;MOTRIN) 200 MG tablet Take 400 mg by mouth every 8 hours as needed for PainHistorical Med      magnesium hydroxide (MILK OF MAGNESIA) 400 MG/5ML suspension Take 30 mLs by mouth daily as needed for ConstipationHistorical Med      levothyroxine (SYNTHROID) 100 MCG tablet Take 1 tablet by mouth every morning (before breakfast)DC to SNF      acetaminophen (TYLENOL) 325 MG tablet Take 2 tablets by mouth every 4 hours as needed for Pain or Fever, Disp-120 tablet, R-3OTC      ferrous sulfate 325 (65 Fe) MG tablet Take 1 tablet by mouth 2 times daily (with meals), Disp-60 tablet, R-1Print             Time Spent on discharge is more than 30 minutes in the examination, evaluation, counseling and review of medications and discharge plan. Signed:    Sanjana Madrigal MD   3/9/2020      Thank you Eric Kawasaki, MD for the opportunity to be involved in this patient's care. If you have any questions or concerns please feel free to contact me at [phone].

## 2020-03-24 PROBLEM — E86.0 DEHYDRATION: Status: RESOLVED | Noted: 2020-02-22 | Resolved: 2020-03-24

## 2020-10-22 NOTE — PLAN OF CARE
Completed speech evaluation; please refer to EMR.     Elif Baldwin M.A., Enrique Garner   Speech-Language Pathologist
Patient has had no complaints of pain during shift. Will continue to monitor.
Patient is a high fall risk and is up with assist x2 with the stedy. Patient alert and oriented x4, fall precautions in place, bed in lowest position and locked, bed alarm on for safety, call light and belongings with in reach. Will continue to monitor.
Problem: Falls - Risk of:  Goal: Will remain free from falls  Description  Will remain free from falls  5/18/2019 1036 by Junior Cici RN  Outcome: Ongoing  5/17/2019 2346 by Ling Ramírez RN  Outcome: Ongoing  Goal: Absence of physical injury  Description  Absence of physical injury  Outcome: Ongoing     Problem: Risk for Impaired Skin Integrity  Goal: Tissue integrity - skin and mucous membranes  Description  Structural intactness and normal physiological function of skin and  mucous membranes. Outcome: Ongoing     Problem: COMMUNICATION IMPAIRMENT  Goal: Ability to express needs and understand communication  5/17/2019 2346 by Ling Ramírez RN  Outcome: Ongoing  Note:   Pt with severe aphasia. Mostly expressive, but has receptive at times. Pt able to answer yes and no appropriately for the most part. Encouraging pt to focus on words and take his time when trying to communicate.
Problem: Falls - Risk of:  Goal: Will remain free from falls  Description  Will remain free from falls  5/19/2019 1056 by Neelima Wallace RN  Outcome: Ongoing  5/19/2019 0119 by Greta Phalen, RN  Note:   Pt free from injury or falls at this time, fall precautions in place, bed in low position, side rail up x2, Leyva Fall Risk: High (45 and higher), bed alarm on, reoriented to room and call light, reminded not to get up without assistance, call light in reach, will continue to monitor. Pt verbalizes understanding of fall risk procedures. Goal: Absence of physical injury  Description  Absence of physical injury  Outcome: Ongoing     Problem: Risk for Impaired Skin Integrity  Goal: Tissue integrity - skin and mucous membranes  Description  Structural intactness and normal physiological function of skin and  mucous membranes. Outcome: Ongoing     Problem: Pain:  Goal: Pain level will decrease  Description  Pain level will decrease  5/19/2019 1056 by Neelima Wallace RN  Outcome: Ongoing  5/19/2019 0119 by Greta Phalen, RN  Outcome: Met This Shift  Note:   Denies pain at this time, vitals stable, assisted to position of comfort.     Goal: Control of acute pain  Description  Control of acute pain  Outcome: Ongoing  Goal: Control of chronic pain  Description  Control of chronic pain  Outcome: Ongoing
Problem: Falls - Risk of:  Goal: Will remain free from falls  Description  Will remain free from falls  5/19/2019 2217 by Enmanuel Allen RN  Outcome: Ongoing  Note:   Non skid socks in use, bed alarm on. Bed is locked and in the lowest position with 2/4 guardrails in position. Call light and personal belongings placed within reach and instructed to use as necessary.  Will continue to monitor
Problem: Falls - Risk of:  Goal: Will remain free from falls  Description  Will remain free from falls  Outcome: Ongoing     Problem: COMMUNICATION IMPAIRMENT  Goal: Ability to express needs and understand communication  Outcome: Ongoing  Note:   Pt with severe aphasia. Mostly expressive, but has receptive at times. Pt able to answer yes and no appropriately for the most part. Encouraging pt to focus on words and take his time when trying to communicate.
Problem: Neurological  Intervention: PT Evaluation/treatment  Note:   Increase safety and independence with functional mobility.
Problem: Pain:  Goal: Pain level will decrease  Description  Pain level will decrease  Outcome: Met This Shift  Note:   Denies pain at this time, vitals stable, assisted to position of comfort. Problem: Falls - Risk of:  Goal: Will remain free from falls  Description  Will remain free from falls  Note:   Pt free from injury or falls at this time, fall precautions in place, bed in low position, side rail up x2, Leyva Fall Risk: High (45 and higher), bed alarm on, reoriented to room and call light, reminded not to get up without assistance, call light in reach, will continue to monitor. Pt verbalizes understanding of fall risk procedures.        Problem: ACTIVITY INTOLERANCE/IMPAIRED MOBILITY  Goal: Mobility/activity is maintained at optimum level for patient  Note:   Right arm flaccid, right leg weak, patient able to use fausto steady with x2 assist
Pt will increase independence with functional transfers and ADLs.
22-Oct-2020 05:43

## 2020-11-03 PROBLEM — I25.10 CAD IN NATIVE ARTERY: Status: RESOLVED | Noted: 2020-11-03 | Resolved: 2020-11-03

## 2020-11-03 PROBLEM — I63.9 CEREBROVASCULAR ACCIDENT (CVA) (HCC): Status: RESOLVED | Noted: 2020-11-03 | Resolved: 2020-11-03

## 2021-08-06 NOTE — ED PROVIDER NOTES
I independently performed a history and physical on Magalys Burk. All diagnostic, treatment, and disposition decisions were made by myself in conjunction with the advanced practice provider. For further details of Magalys Burk emergency department encounter, please see advanced practice provider's documentation    This is an 66-year-old male presenting after an episode of passing out. 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 Patient is somewhat difficult to understand at baseline with speech difficulty from his previous stroke. Physical examination: Right hemiparesis. Good strength and  of left upper extremity and patient is able to move his left leg    Ct Head Wo Contrast    Result Date: 5/17/2019  EXAMINATION: CT OF THE HEAD WITHOUT CONTRAST  5/17/2019 11:20 am TECHNIQUE: CT of the head was performed without the administration of intravenous contrast. Dose modulation, iterative reconstruction, and/or weight based adjustment of the mA/kV was utilized to reduce the radiation dose to as low as reasonably achievable. COMPARISON: None. HISTORY: ORDERING SYSTEM PROVIDED HISTORY: weakness and syncope TECHNOLOGIST PROVIDED HISTORY: Has a \"code stroke\" or \"stroke alert\" been called? ->No Ordering Physician Provided Reason for Exam: pt became orthostatic during therapy, passed out, bp was 71/43, speech is always slurred, right sided paralysis from previous stroke., blurred vision Acuity: Acute Type of Exam: Initial Mechanism of Injury: hx of stroke and seizure FINDINGS: BRAIN/VENTRICLES: Large mostly isodense left-sided extra-axial collection measuring approximately 3.8 cm in thickness compatible with a small amount of hyperdensity inferiorly which may indicate acute component. Small amount of hyperdensity along the anterior interhemispheric fissure also concerning for acute subdural hemorrhage.   Lucencies within the periventricular and subcortical white matter likely represent chronic microvascular ischemic changes. The gray-white differentiation is maintained without evidence of an acute infarct. There is no evidence of hydrocephalus. ORBITS: The visualized portion of the orbits demonstrate no acute abnormality. SINUSES: The visualized paranasal sinuses and mastoid air cells demonstrate no acute abnormality. SOFT TISSUES/SKULL:  No acute abnormality of the visualized skull or soft tissues. Large isodense subdural hematoma along the left superolateral convexity indicating subacute time frame. Small amount of hyperdensity within the collection inferiorly and anteriorly suspicious for acute component. Mild midline shift to the right by 5 mm. Small amount of hyperdensity along the anterior interhemispheric fissure consistent with acute subdural hemorrhage. Chronic microvascular ischemic changes. Critical results were called by Dr. Gerald Curran to Dr. Rosa M Espitia on 5/17/2019 at 11:39. Ct Ankle Right Wo Contrast    Result Date: 4/19/2019  EXAMINATION: CT OF THE RIGHT ANKLE WITHOUT CONTRAST 4/19/2019 2:18 pm TECHNIQUE: CT of the right ankle was performed without the administration of intravenous contrast.  Multiplanar reformatted images are provided for review. Dose modulation, iterative reconstruction, and/or weight based adjustment of the mA/kV was utilized to reduce the radiation dose to as low as reasonably achievable. COMPARISON: None. HISTORY ORDERING SYSTEM PROVIDED HISTORY: Pain and swelling of right lower leg TECHNOLOGIST PROVIDED HISTORY: Reason for exam:->attn right ankle Ordering Physician Provided Reason for Exam: injury 2 wks ago right ankle-now has pain/swelling-unable to bear weight Acuity: Acute Type of Exam: Initial FINDINGS: Bones: Osseous mineralization is within normal limits. No syndesmotic widening. Chronic ossicle inferior to the medial malleolus compatible with a remote deltoid ligament sprain.   Small retrocalcaneal and plantar calcaneal enthesophytes. No fracture or dislocation. No suspicious lytic or blastic osseous lesion. Soft Tissue:  Circumferential ankle and dorsal midfoot subcutaneous fat stranding. No drainable fluid collection. The visualized tendons are grossly intact. No radiopaque foreign body or soft tissue gas. Joint:  Small marginal tibiotalar osteophytes. No osteochondral lesion. The subtalar joint is normal in appearance. The talonavicular and calcaneocuboid joints are unremarkable. Mild 1st tarsometatarsal degenerative changes. 1. Nonspecific circumferential ankle and dorsal midfoot subcutaneous fat stranding compatible with sterile edema versus cellulitis. No drainable fluid collection. 2. No acute osseous abnormality. 3. Mild tibiotalar and 1st TMT degenerative changes. 4. Small retrocalcaneal and plantar calcaneal enthesophytes. Xr Chest Portable    Result Date: 5/17/2019  EXAMINATION: ONE XRAY VIEW OF THE CHEST 5/17/2019 10:48 am COMPARISON: Prior study(s) most recent 12/19/2018. HISTORY: ORDERING SYSTEM PROVIDED HISTORY: SYNCOPE TECHNOLOGIST PROVIDED HISTORY: Reason for exam:->SYNCOPE Ordering Physician Provided Reason for Exam: loc Acuity: Acute Type of Exam: Initial FINDINGS: The heart is upper normal borderline enlarged size. Pulmonary vessels are borderline congested. These findings are somewhat accentuated with low lung volume. There is patchy airspace disease at the lung bases greater on the left. Left costophrenic angle is blunted, possible small effusion. Sternotomy wires are seen from prior surgery. Borderline cardiomegaly and vascular congestion with basilar patchy airspace disease and trace pleural effusion suspected on the left. This could all be due to mild congestive failure. RECOMMENDATION: Recommend follow-up PA and lateral views with full inspiration.      Cta Neck W Contrast    Result Date: 5/17/2019  EXAMINATION: CTA OF THE NECK; CTA OF THE HEAD WITH CONTRAST 5/17/2019 11:26 am: TECHNIQUE: CTA of the neck was performed with the administration of intravenous contrast. Multiplanar reformatted images are provided for review. MIP images are provided for review. Stenosis of the internal carotid arteries measured using NASCET criteria. Dose modulation, iterative reconstruction, and/or weight based adjustment of the mA/kV was utilized to reduce the radiation dose to as low as reasonably achievable.; CTA of the head/brain was performed with the administration of intravenous contrast. Multiplanar reformatted images are provided for review. MIP images are provided for review. Dose modulation, iterative reconstruction, and/or weight based adjustment of the mA/kV was utilized to reduce the radiation dose to as low as reasonably achievable. COMPARISON: 12/19/2018. HISTORY: ORDERING SYSTEM PROVIDED HISTORY: weakness and syncope TECHNOLOGIST PROVIDED HISTORY: Has a \"code stroke\" or \"stroke alert\" been called? ->No Ordering Physician Provided Reason for Exam: (pt became orthostatic during therapy, passed out, bp was 71/43, speech is always slurred, right sided paralysis from previous stroke, blurred vision Acuity: Acute Type of Exam: Initial Additional signs and symptoms: hx of stroke, seizure, and hypertension, s/p left carotid endarterectomy 2013; ORDERING SYSTEM PROVIDED HISTORY: weakness and syncope TECHNOLOGIST PROVIDED HISTORY: Has a \"code stroke\" or \"stroke alert\" been called? ->No FINDINGS: CTA NECK: AORTIC ARCH/ARCH VESSELS: There is a normal 3 vessel arch configuration. There is diffuse atherosclerosis of the aortic arch arch vessels. No flow limiting stenosis seen of the innominate or subclavian arteries. CAROTID ARTERIES: Scattered atherosclerosis of the common carotid arteries without evidence of a flow limiting stenosis. Evidence of prior left-sided carotid endarterectomy. Atherosclerosis involving the right carotid bulb and right ICA.   There is no evidence of a flow limiting stenosis of the internal carotid arteries by NASCET criteria. VERTEBRAL ARTERIES: The vertebral arteries both arise from the subclavian arteries. There is atherosclerosis at the origin of both vertebral arteries contributing to moderate stenosis on the right. Otherwise, no flow limiting stenosis of the vertebral arteries. There is a left dominant vertebral artery. SOFT TISSUES: No acute abnormality identified within the visualized lungs. Subcentimeter superior mediastinal lymph nodes are seen, which is similar to the prior exam. BONES: No acute osseous abnormality seen. CTA HEAD: ANTERIOR CIRCULATION: Atherosclerosis is seen of the internal carotid arteries bilaterally without evidence of a flow limiting stenosis. There appears to be moderate focal stenosis involving a proximal left M2 branch (series [id number] image 25). Otherwise, no focal stenosis seen of the anterior cerebral or middle cerebral arteries. POSTERIOR CIRCULATION: The posterior cerebral arteries demonstrate no focal stenosis. The vertebral and basilar arteries appear unremarkable. No intracranial aneurysm identified. BRAIN: There is a large left subdural hematoma, which appear similar to the earlier CT. There is left-to-right bowing of the midline structures. 1. No flow limiting stenosis of the cervical ICAs by NASCET criteria. 2. Evidence of prior left carotid endarterectomy. 3. Moderate stenosis at the origin of the right vertebral artery. 4. Moderate focal stenosis involving a proximal left M2 branch. 5. Large left subdural hematoma similar to the earlier CT. Cta Head W Contrast    Result Date: 5/17/2019  EXAMINATION: CTA OF THE NECK; CTA OF THE HEAD WITH CONTRAST 5/17/2019 11:26 am: TECHNIQUE: CTA of the neck was performed with the administration of intravenous contrast. Multiplanar reformatted images are provided for review. MIP images are provided for review. Stenosis of the internal carotid arteries measured using NASCET criteria.  Dose flow limiting stenosis of the vertebral arteries. There is a left dominant vertebral artery. SOFT TISSUES: No acute abnormality identified within the visualized lungs. Subcentimeter superior mediastinal lymph nodes are seen, which is similar to the prior exam. BONES: No acute osseous abnormality seen. CTA HEAD: ANTERIOR CIRCULATION: Atherosclerosis is seen of the internal carotid arteries bilaterally without evidence of a flow limiting stenosis. There appears to be moderate focal stenosis involving a proximal left M2 branch (series 614 image 25). Otherwise, no focal stenosis seen of the anterior cerebral or middle cerebral arteries. POSTERIOR CIRCULATION: The posterior cerebral arteries demonstrate no focal stenosis. The vertebral and basilar arteries appear unremarkable. No intracranial aneurysm identified. BRAIN: There is a large left subdural hematoma, which appear similar to the earlier CT. There is left-to-right bowing of the midline structures. 1. No flow limiting stenosis of the cervical ICAs by NASCET criteria. 2. Evidence of prior left carotid endarterectomy. 3. Moderate stenosis at the origin of the right vertebral artery. 4. Moderate focal stenosis involving a proximal left M2 branch. 5. Large left subdural hematoma similar to the earlier CT. The Ekg interpreted by me shows  sinus bradycardia, rate=57 with a rate of 57  Axis is   Normal  QTc is  normal  Intervals and Durations are unremarkable. ST Segments: nonspecific changes    Patient was found to have a subdural hematoma and after discussion with neurosurgery patient will be transferred to the [facility]. for definitive management. Due to the immediate potential for life-threatening deterioration due to neurological complaints as a result of subdural hematoma requiring neurosurgical consultation and care and transfer to an advanced level of care, I spent 30 minutes providing critical care.   This time is excluding time spent Previously Declined (within the last year)